# Patient Record
Sex: FEMALE | Race: WHITE | NOT HISPANIC OR LATINO | Employment: FULL TIME | URBAN - METROPOLITAN AREA
[De-identification: names, ages, dates, MRNs, and addresses within clinical notes are randomized per-mention and may not be internally consistent; named-entity substitution may affect disease eponyms.]

---

## 2019-01-01 ENCOUNTER — TRANSFERRED RECORDS (OUTPATIENT)
Dept: MULTI SPECIALTY CLINIC | Facility: CLINIC | Age: 54
End: 2019-01-01

## 2019-07-30 ENCOUNTER — TRANSFERRED RECORDS (OUTPATIENT)
Dept: MULTI SPECIALTY CLINIC | Facility: CLINIC | Age: 54
End: 2019-07-30
Payer: COMMERCIAL

## 2019-07-30 LAB
HPV ABSTRACT: ABNORMAL
PAP-ABSTRACT: NORMAL

## 2019-09-16 ENCOUNTER — TRANSFERRED RECORDS (OUTPATIENT)
Dept: HEALTH INFORMATION MANAGEMENT | Facility: CLINIC | Age: 54
End: 2019-09-16

## 2019-09-30 ENCOUNTER — TRANSFERRED RECORDS (OUTPATIENT)
Dept: HEALTH INFORMATION MANAGEMENT | Facility: CLINIC | Age: 54
End: 2019-09-30

## 2020-01-13 ENCOUNTER — DOCUMENTATION ONLY (OUTPATIENT)
Dept: CARE COORDINATION | Facility: CLINIC | Age: 55
End: 2020-01-13

## 2020-01-14 NOTE — TELEPHONE ENCOUNTER
RECORDS RECEIVED FROM: Self   Date of Appt: 2/27/20   NOTES (FOR ALL VISITS) STATUS DETAILS   OFFICE NOTE from referring provider N/A    OFFICE NOTE from other specialist Received Dr Mckeon @ [facility]:  9/30/19 9/9/19   DISCHARGE SUMMARY from hospital N/A    DISCHARGE REPORT from the ER Care Everywhere [facility]:  9/5/19   OPERATIVE REPORT N/A    MEDICATION LIST Care Everywhere    IMAGING  (FOR ALL VISITS)     EMG N/A    EEG N/A    MRI (HEAD, NECK, SPINE) Received [facility]:  MRI Cervical Spine 9/16/19  MRI Thoracic Spine 9/16/19    [facility]:  MRI Head 9/5/19   LUMBAR PUNCTURE N/A    SONYA Scan N/A    CT (HEAD, NECK, SPINE) N/A       Action 1/14/20 MV 10.22am   Action Taken Imaging request faxed to [facility] for:  MRI Head 9/5/19     Action 1/14/20 MV 10.22am   Action Taken Records request faxed to [facility]     Action 1/17/20 MV 11.26am   Action Taken Records received from [facility] via fax. Sent to scanning.    Waiting for images     Imaging Received  1/17/20 MV 11.27am  [facility]   Image Type (x): Disc:   PACS: x   Exam Date/Name MRI Head 9/5/19 Comments: images resolved in PACS     Imaging Received  1/27/20 MV 3.51pm  [facility]   Image Type (x): Disc: x  PACS:    Exam Date/Name MRI Cervical Spine 9/16/19  MRI Thoracic Spine 9/16/19 Comments: imaging disk received via mail. Sent to PACS upload

## 2020-02-27 ENCOUNTER — PRE VISIT (OUTPATIENT)
Dept: NEUROLOGY | Facility: CLINIC | Age: 55
End: 2020-02-27

## 2020-06-26 ENCOUNTER — TRANSFERRED RECORDS (OUTPATIENT)
Dept: HEALTH INFORMATION MANAGEMENT | Facility: CLINIC | Age: 55
End: 2020-06-26

## 2020-10-15 ENCOUNTER — TRANSFERRED RECORDS (OUTPATIENT)
Dept: HEALTH INFORMATION MANAGEMENT | Facility: CLINIC | Age: 55
End: 2020-10-15

## 2020-12-10 NOTE — TELEPHONE ENCOUNTER
RECORDS RECEIVED FROM: Self   Date of Appt: 1/14/21   NOTES (FOR ALL VISITS) STATUS DETAILS   OFFICE NOTE from referring provider N/A    OFFICE NOTE from other specialist Received Dr Mckeon @ [facility]:  9/30/19 9/9/19   DISCHARGE SUMMARY from hospital N/A    DISCHARGE REPORT from the ER [facility]where [facility]:  9/5/19   OPERATIVE REPORT N/A    MEDICATION LIST Care Everywhere    IMAGING  (FOR ALL VISITS)     EMG N/A    EEG N/A    LUMBAR PUNCTURE N/A    SONYA SCAN N/A    DEXA SCAN *NEUROSURGERY* N/A    ULTRASOUND (CAROTID BILAT) *VASCULAR* N/A    MRI (HEAD, NECK, SPINE) Received [facility]:  MRI Cervical Spine 9/16/19  MRI Thoracic Spine 9/16/19     [facility]:  MRI Head 9/5/19   XRAY (SPINE) *NEUROSURGERY* N/A    CT (HEAD, NECK, SPINE) N/A       Action    Action Taken 7-22: received records from [facility]. Office notes and reports sent to scanning. Received disc with images: MR head, MR t-spine, MR c-spine all 6-21-61, MR head, MR t-spine, MR c-spine all 6-26-20. Sent to  for processing.

## 2021-01-07 ENCOUNTER — MEDICAL CORRESPONDENCE (OUTPATIENT)
Dept: HEALTH INFORMATION MANAGEMENT | Facility: CLINIC | Age: 56
End: 2021-01-07

## 2021-01-07 ENCOUNTER — TRANSCRIBE ORDERS (OUTPATIENT)
Dept: OTHER | Age: 56
End: 2021-01-07

## 2021-01-07 DIAGNOSIS — H46.9 OPTIC NEURITIS, RIGHT: Primary | ICD-10-CM

## 2021-01-07 DIAGNOSIS — G35 MULTIPLE SCLEROSIS, RELAPSING-REMITTING (H): ICD-10-CM

## 2021-01-08 ENCOUNTER — TELEPHONE (OUTPATIENT)
Dept: OPHTHALMOLOGY | Facility: CLINIC | Age: 56
End: 2021-01-08

## 2021-01-08 NOTE — TELEPHONE ENCOUNTER
Right eye vision changes starting in beginning of November     Not worsening since noticed in beginning of November.    Had some pain in November-- not noticed now    Relapsing MS per pt/neurologist    Scheduled next week Wednesday with Dr. Soto at [facility] location     Pt aware of date/time/location and aware to call for any sudden changes    Note to facilitator    Luis Valdivia, RN 3:21 PM 01/08/21            [facility] Call Center    Phone Message    May a detailed message be left on voicemail: no     Reason for Call: Appointment Intake    Referring Provider Name: Deb Rooney at [facility] to Dr. Soto  Diagnosis and/or Symptoms: Optic neuritis, right, Multiple sclerosis, relapsing-remitting    Action Taken: Message routed to:  [facility] [[id number]]    Travel Screening: Not Applicable

## 2021-01-13 ENCOUNTER — OFFICE VISIT (OUTPATIENT)
Dept: OPHTHALMOLOGY | Facility: CLINIC | Age: 56
End: 2021-01-13
Payer: COMMERCIAL

## 2021-01-13 DIAGNOSIS — H53.40 VISUAL FIELD DEFECT: Primary | ICD-10-CM

## 2021-01-13 DIAGNOSIS — H47.20 PARTIAL OPTIC ATROPHY: Primary | ICD-10-CM

## 2021-01-13 DIAGNOSIS — H47.10 OPTIC DISC EDEMA: ICD-10-CM

## 2021-01-13 DIAGNOSIS — H53.40 VISUAL FIELD DEFECT: ICD-10-CM

## 2021-01-13 PROBLEM — G35 MULTIPLE SCLEROSIS (H): Status: ACTIVE | Noted: 2021-01-13

## 2021-01-13 PROBLEM — F41.9 ANXIETY: Status: ACTIVE | Noted: 2021-01-13

## 2021-01-13 PROBLEM — R87.810 CERVICAL HIGH RISK HPV (HUMAN PAPILLOMAVIRUS) TEST POSITIVE: Status: ACTIVE | Noted: 2017-05-01

## 2021-01-13 PROCEDURE — 99204 OFFICE O/P NEW MOD 45 MIN: CPT | Performed by: OPHTHALMOLOGY

## 2021-01-13 PROCEDURE — 92133 CPTRZD OPH DX IMG PST SGM ON: CPT | Performed by: OPHTHALMOLOGY

## 2021-01-13 PROCEDURE — 92083 EXTENDED VISUAL FIELD XM: CPT | Performed by: OPHTHALMOLOGY

## 2021-01-13 RX ORDER — INTERFERON BETA-1A 8.8-22(6)
KIT SUBCUTANEOUS
COMMUNITY
Start: 2020-02-06 | End: 2021-09-29

## 2021-01-13 RX ORDER — PAROXETINE 30 MG/1
30 TABLET, FILM COATED ORAL EVERY EVENING
COMMUNITY
Start: 2021-01-11

## 2021-01-13 RX ORDER — OXYBUTYNIN CHLORIDE 10 MG/1
10 TABLET, EXTENDED RELEASE ORAL
COMMUNITY
Start: 2019-09-17 | End: 2022-01-04

## 2021-01-13 ASSESSMENT — SLIT LAMP EXAM - LIDS
COMMENTS: NORMAL
COMMENTS: NORMAL

## 2021-01-13 ASSESSMENT — VISUAL ACUITY
OS_CC: 20/20
METHOD: SNELLEN - LINEAR
CORRECTION_TYPE: GLASSES
OD_CC: 20/40

## 2021-01-13 ASSESSMENT — TONOMETRY
IOP_METHOD: ICARE
OD_IOP_MMHG: 15
OS_IOP_MMHG: 16

## 2021-01-13 ASSESSMENT — CONF VISUAL FIELD
OD_NORMAL: 1
METHOD: COUNTING FINGERS
OS_NORMAL: 1

## 2021-01-13 ASSESSMENT — EXTERNAL EXAM - LEFT EYE: OS_EXAM: NORMAL

## 2021-01-13 ASSESSMENT — EXTERNAL EXAM - RIGHT EYE: OD_EXAM: NORMAL

## 2021-01-13 NOTE — NURSING NOTE
"Chief Complaints and History of Present Illnesses   Patient presents with     Consult For     Multiple Sclerosis     Chief Complaint(s) and History of Present Illness(es)     Consult For     Laterality: both eyes    Quality: blurred vision    Associated symptoms: Negative for double vision, eye pain, headache, flashes and floaters    Treatments tried: no treatments    Pain scale: 0/10    Comments: Multiple Sclerosis              Comments     Pt notes that she had VA changes since November, has been stable, \"feels that it is the same symptoms as when I had optic neuritis\", she notes that she feels that symptoms have been stable possibly improving. Blurred VA is in RE only and comes and goes.     Evette Lynch COT January 13, 2021 8:11 AM                  "

## 2021-01-13 NOTE — Clinical Note
1/13/2021       RE: Shakila Kapoor  [address] [place] [address]     Dear Colleague,    Thank you for referring your patient, Shakila Kapoor, to the [facility] [place] [facility]. Please see a copy of my visit note below.         Assessment & Plan     Shakila Kapoor is a 55 year old female with the following diagnoses:   1. Partial optic atrophy    2. Visual field defect    3. Optic disc edema         Patient was sent for consultation by Dr. Deb Mclaughlin* for optic neuritis.  In September 2017 had an episode of optic neuritis.  Imaging done and was diagnosed with multiple sclerosis.  When she was 21 she was diagnosed as probable multiple sclerosis with numbness of her left side of her body and vision loss and diplopia.  In October she had eye pain with blurred vision RIGHT eye.  A cloud would come over eye and would occur a couple times a day and just last for seconds.  It resolved after a few days.  No steroids given and no imaging given.  Denies transient visual obscurations.      Visual acuity 20/40 RIGHT eye and 20/20 left eye.  She has a 1-2+ afferent pupillary defect RIGHT eye.  Color vision 1/11 RIGHT eye and 11/11 left eye.   She has chronic appearing optic disc edema in the right eye.  There is no cup-to-disc ratio.  There appear to be cytoid bodies overlying the right optic nerve.    I personally reviewed her MRI images from 2019.  There appears to be enhancement of the optic nerve sheath in the posterior orbit extending into the optic canal on the right-hand side.      I do not believe that her most recent symptoms were consistent with optic neuritis.  She had some clouding of the vision lasting seconds at a time which only lasted a couple of days.  There was some mild soreness of the eye but otherwise there is nothing consistent with optic neuritis here.  Even if she had optic neuritis in October, I would  not expect the optic nerve to be swollen still.  I am concerned that she has an optic nerve sheath meningioma in the right eye.  I will obtain a new MRI.  If it shows a similar appearance to the nerve sheath, then the diagnosis would be confirmed and we could consider radiation therapy.  I will plan follow-up based on the results of the MRI.           Attending Physician Attestation:  Complete documentation of historical and exam elements from today's encounter can be found in the full encounter summary report (not reduplicated in this progress note).  I personally obtained the chief complaint(s) and history of present illness.  I confirmed and edited as necessary the review of systems, past medical/surgical history, family history, social history, and examination findings as documented by others; and I examined the patient myself.  I personally reviewed the relevant tests, images, and reports as documented above.  I formulated and edited as necessary the assessment and plan and discussed the findings and management plan with the patient and family. - Samuel Soto MD            Again, thank you for allowing me to participate in the care of your patient.      Sincerely,    Samuel Soto MD

## 2021-01-13 NOTE — PROGRESS NOTES
Assessment & Plan     Shakila Kapoor is a 55 year old female with the following diagnoses:   1. Partial optic atrophy    2. Visual field defect    3. Optic disc edema         Patient was sent for consultation by Dr. Deb Mclaughlin* for optic neuritis.  In September 2017 had an episode of optic neuritis.  Imaging done and was diagnosed with multiple sclerosis.  When she was 21 she was diagnosed as probable multiple sclerosis with numbness of her left side of her body and vision loss and diplopia.  In October she had eye pain with blurred vision RIGHT eye.  A cloud would come over eye and would occur a couple times a day and just last for seconds.  It resolved after a few days.  No steroids given and no imaging given.  Denies transient visual obscurations.      Visual acuity 20/40 RIGHT eye and 20/20 left eye.  She has a 1-2+ afferent pupillary defect RIGHT eye.  Color vision 1/11 RIGHT eye and 11/11 left eye.   She has chronic appearing optic disc edema in the right eye.  There is no cup-to-disc ratio.  There appear to be cytoid bodies overlying the right optic nerve.    I personally reviewed her MRI images from 2019.  There appears to be enhancement of the optic nerve sheath in the posterior orbit extending into the optic canal on the right-hand side.      I do not believe that her most recent symptoms were consistent with optic neuritis.  She had some clouding of the vision lasting seconds at a time which only lasted a couple of days.  There was some mild soreness of the eye but otherwise there is nothing consistent with optic neuritis here.  Even if she had optic neuritis in October, I would not expect the optic nerve to be swollen still.  I am concerned that she has an optic nerve sheath meningioma in the right eye.  I will obtain a new MRI.  If it shows a similar appearance to the nerve sheath, then the diagnosis would be confirmed and we could consider radiation therapy.  I will plan follow-up  based on the results of the MRI.           Attending Physician Attestation:  Complete documentation of historical and exam elements from today's encounter can be found in the full encounter summary report (not reduplicated in this progress note).  I personally obtained the chief complaint(s) and history of present illness.  I confirmed and edited as necessary the review of systems, past medical/surgical history, family history, social history, and examination findings as documented by others; and I examined the patient myself.  I personally reviewed the relevant tests, images, and reports as documented above.  I formulated and edited as necessary the assessment and plan and discussed the findings and management plan with the patient and family. - Samuel Soto MD

## 2021-01-13 NOTE — LETTER
2021         RE:  :  MRN: Shakila Kapoor  1965  [id number]     Dear Dr. Davis,    Thank you for asking me to see your very pleasant patient, Shakila Kapoor, in neuro-ophthalmic consultation.  I would like to thank you for sending your records and I have summarized them in the history of present illness.   My assessment and plan are below.  For further details, please see my attached clinic note.      Assessment & Plan     Shakila Kapoor is a 55 year old female with the following diagnoses:   1. Partial optic atrophy    2. Visual field defect    3. Optic disc edema         Patient was sent for consultation by Dr. Deb Mclaughlin* for optic neuritis.  In 2017 had an episode of optic neuritis.  Imaging done and was diagnosed with multiple sclerosis.  When she was 21 she was diagnosed as probable multiple sclerosis with numbness of her left side of her body and vision loss and diplopia.  In October she had eye pain with blurred vision RIGHT eye.  A cloud would come over eye and would occur a couple times a day and just last for seconds.  It resolved after a few days.  No steroids given and no imaging given.  Denies transient visual obscurations.      Visual acuity 20/40 RIGHT eye and 20/20 left eye.  She has a 1-2+ afferent pupillary defect RIGHT eye.  Color vision 1/11 RIGHT eye and 11/11 left eye.   She has chronic appearing optic disc edema in the right eye.  There is no cup-to-disc ratio.  There appear to be cytoid bodies overlying the right optic nerve.    I personally reviewed her MRI images from 2019.  There appears to be enhancement of the optic nerve sheath in the posterior orbit extending into the optic canal on the right-hand side.      I do not believe that her most recent symptoms were consistent with optic neuritis.  She had some clouding of the vision lasting seconds at a time which only lasted a couple of days.  There was some mild soreness of the eye but  otherwise there is nothing consistent with optic neuritis here.  Even if she had optic neuritis in October, I would not expect the optic nerve to be swollen still.  I am concerned that she has an optic nerve sheath meningioma in the right eye.  I will obtain a new MRI.  If it shows a similar appearance to the nerve sheath, then the diagnosis would be confirmed and we could consider radiation therapy.  I will plan follow-up based on the results of the MRI.          Again, thank you for allowing me to participate in the care of your patient.      Sincerely,    Samuel Soto MD  Professor  Ophthalmology Residency   Director of Neuro-Ophthalmology  [facility] Endowed Chair  Departments of Ophthalmology, Neurology, and Neurosurgery  [address]  [address]  T - [phone]  F - [phone]  SARITHA [email or web address]      CC: Deb Davis MD  [facility] [address]  [facility] [address]  Via Fax: [phone]    DX = chronic optic disc edema, ?optic nerve sheath meningioma

## 2021-01-14 ENCOUNTER — PRE VISIT (OUTPATIENT)
Dept: NEUROLOGY | Facility: CLINIC | Age: 56
End: 2021-01-14

## 2021-01-27 ENCOUNTER — ANCILLARY PROCEDURE (OUTPATIENT)
Dept: MRI IMAGING | Facility: CLINIC | Age: 56
End: 2021-01-27
Attending: OPHTHALMOLOGY
Payer: COMMERCIAL

## 2021-01-27 ENCOUNTER — TELEPHONE (OUTPATIENT)
Dept: OPHTHALMOLOGY | Facility: CLINIC | Age: 56
End: 2021-01-27

## 2021-01-27 VITALS — HEIGHT: 65 IN | BODY MASS INDEX: 33.32 KG/M2 | WEIGHT: 200 LBS

## 2021-01-27 DIAGNOSIS — H47.20 PARTIAL OPTIC ATROPHY: ICD-10-CM

## 2021-01-27 DIAGNOSIS — D32.0 PRIMARY MENINGIOMA OF OPTIC NERVE SHEATH (H): Primary | ICD-10-CM

## 2021-01-27 DIAGNOSIS — H53.40 VISUAL FIELD DEFECT: ICD-10-CM

## 2021-01-27 PROCEDURE — 70543 MRI ORBT/FAC/NCK W/O &W/DYE: CPT | Mod: GC | Performed by: RADIOLOGY

## 2021-01-27 PROCEDURE — A9585 GADOBUTROL INJECTION: HCPCS | Performed by: RADIOLOGY

## 2021-01-27 RX ORDER — GADOBUTROL 604.72 MG/ML
10 INJECTION INTRAVENOUS ONCE
Status: COMPLETED | OUTPATIENT
Start: 2021-01-27 | End: 2021-01-27

## 2021-01-27 RX ADMIN — GADOBUTROL 10 ML: 604.72 INJECTION INTRAVENOUS at 11:34

## 2021-01-27 ASSESSMENT — MIFFLIN-ST. JEOR: SCORE: 1513.07

## 2021-01-27 NOTE — TELEPHONE ENCOUNTER
Spoke to patient about results of MRI showing continued abnormality of right optic nerve, which suggests optic nerve sheath meningioma.  Dr. Soto would like patient to see radiation someone for radiation therapy and will help arrange this.  Assisted with scheduling follow up in 6 months.  Patient should return sooner if worsening of vision.       Felisha Henderson on 1/27/2021 at 3:39 PM

## 2021-02-10 NOTE — PROGRESS NOTES
Attending addendum:   I saw and examined the patient with the resident and agree with the documented plan of care.    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 In light of the radiographic evidence concerning for tumor growth over the past 2 years and worsening visual function within the affected right eye as assessed by my colleagues in neuro-ophthalmology, I proposed proceeding with a course of conventionally fractionated radiotherapy to the affected segment of the right optic nerve for improved local disease control.    I specifically proposed a treatment course consisting of 50.4 Medina delivered in 28 once-daily fractions to the tumor and I reviewed the potential acute and late-term toxicities associated with radiation of this location. Following our discussion, Ms. Kapoor was amenable to proceeding with treatment and she signed informed consent to that effect.    I will make arrangements for her to return to clinic for a simulation session within the next 1-2 weeks with treatment to start approximately 1-1.5 weeks thereafter. In the interim, I did provide both Ms. Kapoor and her  with my contact information and gave them instructions to call me should they have any additional questions following our conversation today.    Abdulaziz Pride MD/PhD    Dept of Radiation Oncology  [facility]             Department of Radiation Oncology  [address] [address] [phone]       Consultation Note    Name: Shakila Kapoor MRN: [id number]   : 1965   Date of Service: 2021 Referring: Dr. Samuel Soto / Ophthalmology     Reason for consultation: Right optic nerve sheath meningioma    History of Present Illness   Ms. Kapoor is a 55 year old female with PMH significant for multiple  sclerosis and optic neuritis. She presented to her PCP with ongoing visual symptoms that was initially thought to be related to her optic neuritis. She was referred to Dr. Samuel Soto in Neuro-Ophthalmology for further management on 1/13/2021.    Her ophthalmic exam was notable for visual acuity 20/40 right eye and 20/20 left eye, 1-2+ afferent pupillary defect of right eye, color vision 1/11 right eye and 11/11 left eye, right optic disc edema as well as evidence of cytoid bodies overlying the right optic nerve. Dr. Soto reviewed her MRI imaging from 2019 and felt that her symptoms were related to a right optic sheath meningioma given the enhancement of the right optic nerve sheath. As such, he ordered a new MRI brain which was obtained on 1/27/2021. It showed slight increase in thickening and enhancement of the right optic nerve sheath extending from the orbital apex through the optic canal and adjacent to the cavernous sinus, consistent with right optic nerve sheath meningioma. The patient was referred to us for a discussion of definitive radiation therapy.    Ms. Kapoor presents to radiation oncology clinic today with her  to learn more about the role of radiotherapy in the treatment of her disease.  Her ROS are positive for occasional episodes of cloudy vision however she otherwise denies any new neurologic symptoms as well as specifically denies any fever/chills, ocular pain, double vision, headaches, nausea/vomiting, motor or sensory deficits or changes in appetite and/or weight.    Past Medical History:  Past Medical History:   Diagnosis Date     Multiple sclerosis (H)        Past Surgical History:  None    Chemotherapy History:  None    Radiation History:  None    Pregnant: No     Implanted Cardiac Devices: No    Medications:  Current Outpatient Medications   Medication     oxybutynin ER (DITROPAN-XL) 10 MG 24 hr tablet     PARoxetine (PAXIL) 30 MG tablet     REBIF REBIDOSE TITRATION PACK 6X8.8 & 6X22  MCG SOAJ     Allergies:   Allergies   Allergen Reactions     Penicillins      Other reaction(s): *Unknown     Social History:  Tobacco: Never  Alcohol: Yes      Family History:  No family history of cancer, glaucoma or macular degeneration    Review of Systems   A 10-point review of systems was performed. Pertinent findings are noted in the HPI.    Physical Exam   ECOG Status: 1    Vitals:  BP: 153/88  Weight: 94.8 kg    General: Healthy-appearing 55-year-old female seated comfortably in an examination chair in no acute distress  HEENT: NC/AT. Sclerae anicteric. EOMI.  Pulmonary: No wheezing, stridor or respiratory distress  Neuro: A/O x3. 5/5 strength throughout.     Imaging   Imaging: Reviewed    Assessment    Ms. Kapoor is a 55 year old female with a history of multiple sclerosis and a recent diagnosis of a right optic nerve sheath meningioma. Her most recent surveillance imaging showed increased size and enhancement of the meningioma compared to exam in 2019.    Plan   We discussed with the patient the available management options of her right optic nerve sheath meningioma including observation and definitive radiation therapy.     That said, in the light of her ongoing visual symptoms and continued growth compared to the last imaging in 2019, we recommend definitive radiation therapy for optimal local control. We plan to deliver 5,040 cGy in 28 fractions at 180 cGy per fraction. We discussed the rationale, logistics, benefits, alternatives and potential side effects associated with the treatment including but not limited to fatigue, temporary exacerbation of eye symptoms, cataract and dry eye.    At end of discussion, the patient agreed with the plan and signed the treatment consent. We will schedule a CT and MRI-based simulation within 2 weeks.    The patient and her  had many questions during our conversation that were answered to their satisfaction and verbalized understanding.     The patient  was seen and discussed with staff, Dr. Pride.    Nadeem Pelletier MD  PGY-4 Resident, Radiation Oncology  [facility]

## 2021-02-11 ENCOUNTER — OFFICE VISIT (OUTPATIENT)
Dept: RADIATION ONCOLOGY | Facility: CLINIC | Age: 56
End: 2021-02-11
Attending: OPHTHALMOLOGY
Payer: COMMERCIAL

## 2021-02-11 VITALS
DIASTOLIC BLOOD PRESSURE: 88 MMHG | SYSTOLIC BLOOD PRESSURE: 153 MMHG | HEIGHT: 65 IN | WEIGHT: 209 LBS | BODY MASS INDEX: 34.82 KG/M2

## 2021-02-11 DIAGNOSIS — D32.0 PRIMARY MENINGIOMA OF OPTIC NERVE SHEATH (H): ICD-10-CM

## 2021-02-11 PROCEDURE — G0463 HOSPITAL OUTPT CLINIC VISIT: HCPCS | Performed by: RADIOLOGY

## 2021-02-11 ASSESSMENT — MIFFLIN-ST. JEOR: SCORE: 1553.9

## 2021-02-11 ASSESSMENT — ENCOUNTER SYMPTOMS
CONSTIPATION: 0
ORTHOPNEA: 0
FALLS: 0
LOSS OF CONSCIOUSNESS: 1
DIAPHORESIS: 0
ABDOMINAL PAIN: 0
DEPRESSION: 0
HEADACHES: 0
TREMORS: 0
SEIZURES: 0
EYE PAIN: 0
MEMORY LOSS: 0
WEIGHT LOSS: 0
NECK PAIN: 0
SHORTNESS OF BREATH: 0
SPUTUM PRODUCTION: 0
DOUBLE VISION: 0
FLANK PAIN: 0
HALLUCINATIONS: 0
TINGLING: 0
CLAUDICATION: 0
EYE DISCHARGE: 0
CHILLS: 1
SENSORY CHANGE: 0
PND: 0
HEMATURIA: 0
HEARTBURN: 0
INSOMNIA: 0
EYE REDNESS: 0
SINUS PAIN: 0
COUGH: 0
BRUISES/BLEEDS EASILY: 0
HEMOPTYSIS: 0
FREQUENCY: 0
BACK PAIN: 0
POLYDIPSIA: 0
WHEEZING: 0
FOCAL WEAKNESS: 0
SPEECH CHANGE: 1
BLOOD IN STOOL: 0
DYSURIA: 0
BLURRED VISION: 1
NAUSEA: 0
DIARRHEA: 0
PHOTOPHOBIA: 0
WEAKNESS: 0
PALPITATIONS: 0
NERVOUS/ANXIOUS: 1
MYALGIAS: 0
DIZZINESS: 0
SORE THROAT: 0
VOMITING: 0
STRIDOR: 0
FEVER: 0

## 2021-02-11 ASSESSMENT — LIFESTYLE VARIABLES: SUBSTANCE_ABUSE: 0

## 2021-02-11 NOTE — PROGRESS NOTES
HPI    INITIAL PATIENT ASSESSMENT    Diagnosis: Meningioma     Prior radiation therapy: None    Prior chemotherapy: None    Prior hormonal therapy:No    Pain Eval:  Denies    Psychosocial  Living arrangements: Lives   Fall Risk: independent   referral needs: Not needed    Advanced Directive: No  Implantable Cardiac Device? No      Nurse face-to-face time: Level 5:  over 15 min face to face time  Review of Systems   Constitutional: Positive for chills and malaise/fatigue. Negative for diaphoresis, fever and weight loss.   HENT: Negative for congestion, ear discharge, ear pain, hearing loss, nosebleeds, sinus pain, sore throat and tinnitus.    Eyes: Positive for blurred vision. Negative for double vision, photophobia, pain, discharge and redness.   Respiratory: Negative for cough, hemoptysis, sputum production, shortness of breath, wheezing and stridor.    Cardiovascular: Negative for chest pain, palpitations, orthopnea, claudication, leg swelling and PND.   Gastrointestinal: Negative for abdominal pain, blood in stool, constipation, diarrhea, heartburn, melena, nausea and vomiting.   Genitourinary: Negative for dysuria, flank pain, frequency, hematuria and urgency.   Musculoskeletal: Positive for joint pain. Negative for back pain, falls, myalgias and neck pain.   Skin: Negative for itching and rash.   Neurological: Positive for speech change and loss of consciousness. Negative for dizziness, tingling, tremors, sensory change, focal weakness, seizures, weakness and headaches.   Endo/Heme/Allergies: Negative for environmental allergies and polydipsia. Does not bruise/bleed easily.   Psychiatric/Behavioral: Negative for depression, hallucinations, memory loss, substance abuse and suicidal ideas. The patient is nervous/anxious. The patient does not have insomnia.

## 2021-02-11 NOTE — LETTER
2021         RE: Shakila Kapoor  [address] [place] [address]      Attending addendum:   I saw and examined the patient with the resident and agree with the documented plan of care.    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 In light of the radiographic evidence concerning for tumor growth over the past 2 years and worsening visual function within the affected right eye as assessed by my colleagues in neuro-ophthalmology, I proposed proceeding with a course of conventionally fractionated radiotherapy to the affected segment of the right optic nerve for improved local disease control.    I specifically proposed a treatment course consisting of 50.4 Med[place] delivered in 28 once-daily fractions to the tumor and I reviewed the potential acute and late-term toxicities associated with radiation of this location. Following our discussion, Ms. Kapoor was amenable to proceeding with treatment and she signed informed consent to that effect.    I will make arrangements for her to return to clinic for a simulation session within the next 1-2 weeks with treatment to start approximately 1-1.5 weeks thereafter. In the interim, I did provide both Ms. Kapoor and her  with my contact information and gave them instructions to call me should they have any additional questions following our conversation today.    Abdulaziz Pride MD/PhD    Dept of Radiation Oncology  [facility]             Department of Radiation Oncology  [address] [address] [phone]       Consultation Note    Name: Shakila Kapoor MRN: [id number]   : 1965   Date of Service: 2021 Referring: Dr. Samuel Soto / Ophthalmology     Reason for consultation: Right optic nerve sheath meningioma    History of  Present Illness   Ms. Kapoor is a 55 year old female with PMH significant for multiple sclerosis and optic neuritis. She presented to her PCP with ongoing visual symptoms that was initially thought to be related to her optic neuritis. She was referred to Dr. Samuel Soto in Neuro-Ophthalmology for further management on 1/13/2021.    Her ophthalmic exam was notable for visual acuity 20/40 right eye and 20/20 left eye, 1-2+ afferent pupillary defect of right eye, color vision 1/11 right eye and 11/11 left eye, right optic disc edema as well as evidence of cytoid bodies overlying the right optic nerve. Dr. Soto reviewed her MRI imaging from 2019 and felt that her symptoms were related to a right optic sheath meningioma given the enhancement of the right optic nerve sheath. As such, he ordered a new MRI brain which was obtained on 1/27/2021. It showed slight increase in thickening and enhancement of the right optic nerve sheath extending from the orbital apex through the optic canal and adjacent to the cavernous sinus, consistent with right optic nerve sheath meningioma. The patient was referred to us for a discussion of definitive radiation therapy.    Ms. Kapoor presents to radiation oncology clinic today with her  to learn more about the role of radiotherapy in the treatment of her disease.  Her ROS are positive for occasional episodes of cloudy vision however she otherwise denies any new neurologic symptoms as well as specifically denies any fever/chills, ocular pain, double vision, headaches, nausea/vomiting, motor or sensory deficits or changes in appetite and/or weight.    Past Medical History:  Past Medical History:   Diagnosis Date     Multiple sclerosis (H)        Past Surgical History:  None    Chemotherapy History:  None    Radiation History:  None    Pregnant: No     Implanted Cardiac Devices: No    Medications:  Current Outpatient Medications   Medication     oxybutynin ER (DITROPAN-XL) 10 MG 24 hr  tablet     PARoxetine (PAXIL) 30 MG tablet     REBIF REBIDOSE TITRATION PACK 6X8.8 & 6X22 MCG SOAJ     Allergies:   Allergies   Allergen Reactions     Penicillins      Other reaction(s): *Unknown     Social History:  Tobacco: Never  Alcohol: Yes      Family History:  No family history of cancer, glaucoma or macular degeneration    Review of Systems   A 10-point review of systems was performed. Pertinent findings are noted in the HPI.    Physical Exam   ECOG Status: 1    Vitals:  BP: 153/88  Weight: 94.8 kg    General: Healthy-appearing 55-year-old female seated comfortably in an examination chair in no acute distress  HEENT: NC/AT. Sclerae anicteric. EOMI.  Pulmonary: No wheezing, stridor or respiratory distress  Neuro: A/O x3. 5/5 strength throughout.     Imaging   Imaging: Reviewed    Assessment    Ms. Kapoor is a 55 year old female with a history of multiple sclerosis and a recent diagnosis of a right optic nerve sheath meningioma. Her most recent surveillance imaging showed increased size and enhancement of the meningioma compared to exam in 2019.    Plan   We discussed with the patient the available management options of her right optic nerve sheath meningioma including observation and definitive radiation therapy.     That said, in the light of her ongoing visual symptoms and continued growth compared to the last imaging in 2019, we recommend definitive radiation therapy for optimal local control. We plan to deliver 5,040 cGy in 28 fractions at 180 cGy per fraction. We discussed the rationale, logistics, benefits, alternatives and potential side effects associated with the treatment including but not limited to fatigue, temporary exacerbation of eye symptoms, cataract and dry eye.    At end of discussion, the patient agreed with the plan and signed the treatment consent. We will schedule a CT and MRI-based simulation within 2 weeks.    The patient and her  had many questions during our  conversation that were answered to their satisfaction and verbalized understanding.     The patient was seen and discussed with staff, Dr. Pride.    Nadeem Pelletier MD  PGY-4 Resident, Radiation Oncology  [facility]              HPI    INITIAL PATIENT ASSESSMENT    Diagnosis: Meningioma     Prior radiation therapy: None    Prior chemotherapy: None    Prior hormonal therapy:No    Pain Eval:  Denies    Psychosocial  Living arrangements: Lives   Fall Risk: independent   referral needs: Not needed    Advanced Directive: No  Implantable Cardiac Device? No      Nurse face-to-face time: Level 5:  over 15 min face to face time  Review of Systems   Constitutional: Positive for chills and malaise/fatigue. Negative for diaphoresis, fever and weight loss.   HENT: Negative for congestion, ear discharge, ear pain, hearing loss, nosebleeds, sinus pain, sore throat and tinnitus.    Eyes: Positive for blurred vision. Negative for double vision, photophobia, pain, discharge and redness.   Respiratory: Negative for cough, hemoptysis, sputum production, shortness of breath, wheezing and stridor.    Cardiovascular: Negative for chest pain, palpitations, orthopnea, claudication, leg swelling and PND.   Gastrointestinal: Negative for abdominal pain, blood in stool, constipation, diarrhea, heartburn, melena, nausea and vomiting.   Genitourinary: Negative for dysuria, flank pain, frequency, hematuria and urgency.   Musculoskeletal: Positive for joint pain. Negative for back pain, falls, myalgias and neck pain.   Skin: Negative for itching and rash.   Neurological: Positive for speech change and loss of consciousness. Negative for dizziness, tingling, tremors, sensory change, focal weakness, seizures, weakness and headaches.   Endo/Heme/Allergies: Negative for environmental allergies and polydipsia. Does not bruise/bleed easily.   Psychiatric/Behavioral: Negative for depression,  hallucinations, memory loss, substance abuse and suicidal ideas. The patient is nervous/anxious. The patient does not have insomnia.        Abdulaziz Pride MD

## 2021-02-15 LAB
ALT SERPL-CCNC: 53 U/L (ref 6–29)
AST SERPL-CCNC: 35 U/L (ref 10–35)

## 2021-02-25 ENCOUNTER — ALLIED HEALTH/NURSE VISIT (OUTPATIENT)
Dept: RADIATION ONCOLOGY | Facility: CLINIC | Age: 56
End: 2021-02-25
Attending: RADIOLOGY
Payer: COMMERCIAL

## 2021-02-25 ENCOUNTER — HOSPITAL ENCOUNTER (OUTPATIENT)
Dept: MRI IMAGING | Facility: CLINIC | Age: 56
Discharge: HOME | End: 2021-02-25
Attending: RADIOLOGY | Admitting: RADIOLOGY
Payer: COMMERCIAL

## 2021-02-25 DIAGNOSIS — D32.0 PRIMARY MENINGIOMA OF OPTIC NERVE SHEATH (H): ICD-10-CM

## 2021-02-25 DIAGNOSIS — D32.0 PRIMARY MENINGIOMA OF OPTIC NERVE SHEATH (H): Primary | ICD-10-CM

## 2021-02-25 PROCEDURE — A9585 GADOBUTROL INJECTION: HCPCS | Performed by: RADIOLOGY

## 2021-02-25 PROCEDURE — 77334 RADIATION TREATMENT AID(S): CPT | Performed by: RADIOLOGY

## 2021-02-25 PROCEDURE — 255N000002 HC RX 255 OP 636: Performed by: RADIOLOGY

## 2021-02-25 PROCEDURE — 70553 MRI BRAIN STEM W/O & W/DYE: CPT | Mod: 26 | Performed by: STUDENT IN AN ORGANIZED HEALTH CARE EDUCATION/TRAINING PROGRAM

## 2021-02-25 PROCEDURE — 77263 THER RADIOLOGY TX PLNG CPLX: CPT | Performed by: RADIOLOGY

## 2021-02-25 PROCEDURE — 70553 MRI BRAIN STEM W/O & W/DYE: CPT

## 2021-02-25 PROCEDURE — 70543 MRI ORBT/FAC/NCK W/O &W/DYE: CPT | Mod: 26 | Performed by: STUDENT IN AN ORGANIZED HEALTH CARE EDUCATION/TRAINING PROGRAM

## 2021-02-25 PROCEDURE — 77334 RADIATION TREATMENT AID(S): CPT | Mod: 26 | Performed by: RADIOLOGY

## 2021-02-25 RX ORDER — GADOBUTROL 604.72 MG/ML
10 INJECTION INTRAVENOUS ONCE
Status: COMPLETED | OUTPATIENT
Start: 2021-02-25 | End: 2021-02-25

## 2021-02-25 RX ADMIN — GADOBUTROL 10 ML: 604.72 INJECTION INTRAVENOUS at 15:11

## 2021-03-08 ENCOUNTER — APPOINTMENT (OUTPATIENT)
Dept: RADIATION ONCOLOGY | Facility: CLINIC | Age: 56
End: 2021-03-08
Attending: RADIOLOGY
Payer: COMMERCIAL

## 2021-03-08 PROCEDURE — 77014 PR CT GUIDE FOR PLACEMENT RADIATION THERAPY FIELDS: CPT | Mod: 26 | Performed by: RADIOLOGY

## 2021-03-08 PROCEDURE — 77386 HC IMRT TREATMENT DELIVERY, COMPLEX: CPT | Performed by: RADIOLOGY

## 2021-03-09 ENCOUNTER — APPOINTMENT (OUTPATIENT)
Dept: RADIATION ONCOLOGY | Facility: CLINIC | Age: 56
End: 2021-03-09
Attending: RADIOLOGY
Payer: COMMERCIAL

## 2021-03-09 PROCEDURE — 77014 PR CT GUIDE FOR PLACEMENT RADIATION THERAPY FIELDS: CPT | Mod: 26 | Performed by: RADIOLOGY

## 2021-03-09 PROCEDURE — 77386 HC IMRT TREATMENT DELIVERY, COMPLEX: CPT | Performed by: RADIOLOGY

## 2021-03-10 ENCOUNTER — APPOINTMENT (OUTPATIENT)
Dept: RADIATION ONCOLOGY | Facility: CLINIC | Age: 56
End: 2021-03-10
Attending: RADIOLOGY
Payer: COMMERCIAL

## 2021-03-10 PROCEDURE — 77014 PR CT GUIDE FOR PLACEMENT RADIATION THERAPY FIELDS: CPT | Mod: 26 | Performed by: RADIOLOGY

## 2021-03-10 PROCEDURE — 77386 HC IMRT TREATMENT DELIVERY, COMPLEX: CPT | Performed by: RADIOLOGY

## 2021-03-11 ENCOUNTER — OFFICE VISIT (OUTPATIENT)
Dept: RADIATION ONCOLOGY | Facility: CLINIC | Age: 56
End: 2021-03-11
Attending: RADIOLOGY
Payer: COMMERCIAL

## 2021-03-11 VITALS
DIASTOLIC BLOOD PRESSURE: 81 MMHG | SYSTOLIC BLOOD PRESSURE: 128 MMHG | WEIGHT: 209 LBS | BODY MASS INDEX: 34.78 KG/M2 | HEART RATE: 74 BPM

## 2021-03-11 DIAGNOSIS — D32.0 PRIMARY MENINGIOMA OF OPTIC NERVE SHEATH (H): Primary | ICD-10-CM

## 2021-03-11 PROCEDURE — 77386 HC IMRT TREATMENT DELIVERY, COMPLEX: CPT | Performed by: RADIOLOGY

## 2021-03-11 RX ORDER — MINERAL OIL, PETROLATUM 425; 573 MG/G; MG/G
OINTMENT OPHTHALMIC
Qty: 3.5 G | Refills: 3 | Status: SHIPPED | OUTPATIENT
Start: 2021-03-11 | End: 2022-11-10

## 2021-03-11 NOTE — PROGRESS NOTES
RADIATION ONCOLOGY WEEKLY ON TREATMENT VISIT   Encounter Date: 2021    Patient Name: Shakila Kapoor  MRN: [id number]  : 1965     Disease and Stage: Right optic nerve sheath meningioma  Treatment Site: Right optic nerve  Current Dose/Planned Total Dose: 720 / 5040 cGy  Daily Fraction Size: 180 cGy/day, 5 times/week  Concurrent Chemotherapy: No    Treatment Summary:    3/8/2021: Initiation of radiotherapy    3/11/2021: Weekly RT visit. Current dose of 720 cGy. Tolerating treatment well.    ED visits/Hospitalizations:  None    Missed Treatments:  None    Subjective: Ms. Kapoor presents to clinic today for her weekly on-treatment visit. She has tolerated the initiation of radiotherapy very well and has no radiation-induced toxicities.    ROS:   Constitutional  Pain (0-10): 0  Fatigue: None    Nutrition  Diet: None    CNS  Headaches: None    ENT  Mucositis: None    Cardiopulmonary  Dyspnea: None  Chest pain: None    GI  Nausea/vomiting: None    Integumentary  Dermatitis: None    Objective:   Weight: 94.8 kg  BP: 128/81  Pulse: 74    General: 55-year-old female seated comfortably in a chair in no acute distress  HEENT: NC/AT.  No alopecia.  EOMI.  PERRL.  Pulmonary: No wheezing, stridor or respiratory distress  Skin: No erythema    Treatment-related toxicities (CTCAE v5.0):  None    Assessment:    Ms. Kapoor is a 55 year old female with a right optic nerve sheath meningioma. She is receiving definitive radiation therapy for local control purposes. She has tolerated treatment well thus far with no acute treatment-related toxicities.    Plan:   Right optic nerve sheath meningioma:    Continue radiotherapy    Fluids/Electrolytes/Nutrition:    Continue diet as tolerated    Dry eye prophylaxis:    Recommended starting saline eyedrops throughout the day with application of Refresh p.m. drops to the right eye nightly    Mosaiq chart and setup information reviewed  MVCT images reviewed    Medication  Review  Med list reviewed with patient?: Yes    Abdulaziz Pride MD/PhD    Dept of Radiation Oncology  [facility]

## 2021-03-12 ENCOUNTER — APPOINTMENT (OUTPATIENT)
Dept: RADIATION ONCOLOGY | Facility: CLINIC | Age: 56
End: 2021-03-12
Attending: RADIOLOGY
Payer: COMMERCIAL

## 2021-03-12 PROCEDURE — 77014 PR CT GUIDE FOR PLACEMENT RADIATION THERAPY FIELDS: CPT | Mod: 26 | Performed by: RADIOLOGY

## 2021-03-12 PROCEDURE — 77427 RADIATION TX MANAGEMENT X5: CPT | Performed by: RADIOLOGY

## 2021-03-12 PROCEDURE — 77386 HC IMRT TREATMENT DELIVERY, COMPLEX: CPT | Performed by: RADIOLOGY

## 2021-03-12 PROCEDURE — 77336 RADIATION PHYSICS CONSULT: CPT | Performed by: RADIOLOGY

## 2021-03-15 ENCOUNTER — APPOINTMENT (OUTPATIENT)
Dept: RADIATION ONCOLOGY | Facility: CLINIC | Age: 56
End: 2021-03-15
Attending: RADIOLOGY
Payer: COMMERCIAL

## 2021-03-15 PROCEDURE — 77386 HC IMRT TREATMENT DELIVERY, COMPLEX: CPT | Performed by: RADIOLOGY

## 2021-03-15 PROCEDURE — 77014 PR CT GUIDE FOR PLACEMENT RADIATION THERAPY FIELDS: CPT | Mod: 26 | Performed by: RADIOLOGY

## 2021-03-16 ENCOUNTER — APPOINTMENT (OUTPATIENT)
Dept: RADIATION ONCOLOGY | Facility: CLINIC | Age: 56
End: 2021-03-16
Attending: RADIOLOGY
Payer: COMMERCIAL

## 2021-03-16 PROCEDURE — 77386 HC IMRT TREATMENT DELIVERY, COMPLEX: CPT | Performed by: RADIOLOGY

## 2021-03-16 PROCEDURE — 77014 PR CT GUIDE FOR PLACEMENT RADIATION THERAPY FIELDS: CPT | Mod: 26 | Performed by: RADIOLOGY

## 2021-03-17 ENCOUNTER — APPOINTMENT (OUTPATIENT)
Dept: RADIATION ONCOLOGY | Facility: CLINIC | Age: 56
End: 2021-03-17
Attending: RADIOLOGY
Payer: COMMERCIAL

## 2021-03-17 PROCEDURE — 77014 PR CT GUIDE FOR PLACEMENT RADIATION THERAPY FIELDS: CPT | Mod: 26 | Performed by: RADIOLOGY

## 2021-03-17 PROCEDURE — 77386 HC IMRT TREATMENT DELIVERY, COMPLEX: CPT | Performed by: RADIOLOGY

## 2021-03-18 ENCOUNTER — OFFICE VISIT (OUTPATIENT)
Dept: RADIATION ONCOLOGY | Facility: CLINIC | Age: 56
End: 2021-03-18
Attending: RADIOLOGY
Payer: COMMERCIAL

## 2021-03-18 VITALS
SYSTOLIC BLOOD PRESSURE: 151 MMHG | WEIGHT: 210 LBS | DIASTOLIC BLOOD PRESSURE: 95 MMHG | HEART RATE: 77 BPM | BODY MASS INDEX: 34.95 KG/M2

## 2021-03-18 DIAGNOSIS — D32.0 PRIMARY MENINGIOMA OF OPTIC NERVE SHEATH (H): Primary | ICD-10-CM

## 2021-03-18 PROCEDURE — 77386 HC IMRT TREATMENT DELIVERY, COMPLEX: CPT | Performed by: RADIOLOGY

## 2021-03-19 ENCOUNTER — APPOINTMENT (OUTPATIENT)
Dept: RADIATION ONCOLOGY | Facility: CLINIC | Age: 56
End: 2021-03-19
Attending: RADIOLOGY
Payer: COMMERCIAL

## 2021-03-19 PROCEDURE — 77336 RADIATION PHYSICS CONSULT: CPT | Performed by: RADIOLOGY

## 2021-03-19 PROCEDURE — 77014 PR CT GUIDE FOR PLACEMENT RADIATION THERAPY FIELDS: CPT | Mod: 26 | Performed by: RADIOLOGY

## 2021-03-19 PROCEDURE — 77427 RADIATION TX MANAGEMENT X5: CPT | Performed by: RADIOLOGY

## 2021-03-19 PROCEDURE — 77386 HC IMRT TREATMENT DELIVERY, COMPLEX: CPT | Performed by: RADIOLOGY

## 2021-03-19 NOTE — PROGRESS NOTES
RADIATION ONCOLOGY WEEKLY ON TREATMENT VISIT   Encounter Date: 2021    Patient Name: Shakila Kapoor  MRN: [id number]  : 1965     Disease and Stage: Right optic nerve sheath meningioma  Treatment Site: Right optic nerve  Current Dose/Planned Total Dose: 1620 / 5040 cGy  Daily Fraction Size: 180 cGy/day, 5 times/week  Concurrent Chemotherapy: No    Treatment Summary:    3/8/2021: Initiation of radiotherapy    3/11/2021: Weekly RT visit. Current dose of 720 cGy. Tolerating treatment well.    3/18/2021: Weekly RT visit. Current dose of 1620 cGy. Tolerating treatment well.    ED visits/Hospitalizations:  None    Missed Treatments:  None    Subjective: Ms. Kapoor presents to clinic today for her weekly on-treatment visit. She continues to tolerate radiotherapy very well and has no pressing concerns or complaints on examination today. She does describe some mild fatigue however is unsure if this is related to her radiation or her baseline fatigue from her MS. Her remaining ROS are otherwise negative.    ROS:   Constitutional  Pain (0-10): 0  Fatigue: Mild    Nutrition  Diet: Regular    CNS  Headaches: None    ENT  Mucositis: None    Cardiopulmonary  Dyspnea: None  Chest pain: None    GI  Nausea/vomiting: None    Integumentary  Dermatitis: None    Objective:   Weight: 95.3 kg  BP: 151/95  Pulse: 77    General: 55-year-old female seated comfortably in a chair in no acute distress  HEENT: NC/AT.  No alopecia.  EOMI.    Pulmonary: No wheezing, stridor or respiratory distress  Skin: No erythema    Treatment-related toxicities (CTCAE v5.0):  Fatigue: Grade 1    Assessment:    Ms. Kapoor is a 55 year old female with a right optic nerve sheath meningioma. She is receiving definitive radiation therapy for local control purposes. She is tolerating radiotherapy very well with mild associated fatigue.    Plan:   Right optic nerve sheath meningioma:    Continue  radiotherapy    Fluids/Electrolytes/Nutrition:    Continue diet as tolerated    Dry eye prophylaxis:    Continue saline eyedrops to the right eye as needed throughout the day with Refresh PM drops to the right eye nightly    Mosaiq chart and setup information reviewed  MVCT images reviewed    Medication Review  Med list reviewed with patient?: Yes    Abdulaziz Pride MD/PhD    Dept of Radiation Oncology  [facility]

## 2021-03-22 ENCOUNTER — APPOINTMENT (OUTPATIENT)
Dept: RADIATION ONCOLOGY | Facility: CLINIC | Age: 56
End: 2021-03-22
Attending: RADIOLOGY
Payer: COMMERCIAL

## 2021-03-22 PROCEDURE — 77014 PR CT GUIDE FOR PLACEMENT RADIATION THERAPY FIELDS: CPT | Mod: 26 | Performed by: RADIOLOGY

## 2021-03-22 PROCEDURE — 77386 HC IMRT TREATMENT DELIVERY, COMPLEX: CPT | Performed by: RADIOLOGY

## 2021-03-23 ENCOUNTER — APPOINTMENT (OUTPATIENT)
Dept: RADIATION ONCOLOGY | Facility: CLINIC | Age: 56
End: 2021-03-23
Attending: RADIOLOGY
Payer: COMMERCIAL

## 2021-03-23 PROCEDURE — 77386 HC IMRT TREATMENT DELIVERY, COMPLEX: CPT | Performed by: RADIOLOGY

## 2021-03-23 PROCEDURE — 77014 PR CT GUIDE FOR PLACEMENT RADIATION THERAPY FIELDS: CPT | Mod: 26 | Performed by: RADIOLOGY

## 2021-03-24 ENCOUNTER — APPOINTMENT (OUTPATIENT)
Dept: RADIATION ONCOLOGY | Facility: CLINIC | Age: 56
End: 2021-03-24
Attending: RADIOLOGY
Payer: COMMERCIAL

## 2021-03-24 PROCEDURE — 77386 HC IMRT TREATMENT DELIVERY, COMPLEX: CPT | Performed by: RADIOLOGY

## 2021-03-24 PROCEDURE — 77014 PR CT GUIDE FOR PLACEMENT RADIATION THERAPY FIELDS: CPT | Mod: 26 | Performed by: RADIOLOGY

## 2021-03-25 ENCOUNTER — APPOINTMENT (OUTPATIENT)
Dept: RADIATION ONCOLOGY | Facility: CLINIC | Age: 56
End: 2021-03-25
Attending: RADIOLOGY
Payer: COMMERCIAL

## 2021-03-25 PROCEDURE — 77014 PR CT GUIDE FOR PLACEMENT RADIATION THERAPY FIELDS: CPT | Mod: 26 | Performed by: RADIOLOGY

## 2021-03-25 PROCEDURE — 77386 HC IMRT TREATMENT DELIVERY, COMPLEX: CPT | Performed by: RADIOLOGY

## 2021-03-26 ENCOUNTER — OFFICE VISIT (OUTPATIENT)
Dept: RADIATION ONCOLOGY | Facility: CLINIC | Age: 56
End: 2021-03-26
Attending: RADIOLOGY
Payer: COMMERCIAL

## 2021-03-26 VITALS
WEIGHT: 205.7 LBS | BODY MASS INDEX: 34.27 KG/M2 | OXYGEN SATURATION: 96 % | HEIGHT: 65 IN | RESPIRATION RATE: 16 BRPM | SYSTOLIC BLOOD PRESSURE: 136 MMHG | HEART RATE: 93 BPM | DIASTOLIC BLOOD PRESSURE: 86 MMHG

## 2021-03-26 DIAGNOSIS — D32.0 PRIMARY MENINGIOMA OF OPTIC NERVE SHEATH (H): Primary | ICD-10-CM

## 2021-03-26 PROCEDURE — 77014 PR CT GUIDE FOR PLACEMENT RADIATION THERAPY FIELDS: CPT | Mod: 26 | Performed by: RADIOLOGY

## 2021-03-26 PROCEDURE — 77427 RADIATION TX MANAGEMENT X5: CPT | Performed by: RADIOLOGY

## 2021-03-26 PROCEDURE — 77336 RADIATION PHYSICS CONSULT: CPT | Performed by: RADIOLOGY

## 2021-03-26 PROCEDURE — 77386 HC IMRT TREATMENT DELIVERY, COMPLEX: CPT | Performed by: RADIOLOGY

## 2021-03-26 ASSESSMENT — PAIN SCALES - GENERAL: PAINLEVEL: NO PAIN (0)

## 2021-03-26 ASSESSMENT — MIFFLIN-ST. JEOR: SCORE: 1538.93

## 2021-03-26 NOTE — PROGRESS NOTES
RADIATION ONCOLOGY WEEKLY ON TREATMENT VISIT   Encounter Date: 2021    Patient Name: Shakila Kapoor  MRN: [id number]  : 1965     Disease and Stage: Right optic nerve sheath meningioma  Treatment Site: Right optic nerve  Current Dose/Planned Total Dose: 2700 / 5040 cGy  Daily Fraction Size: 180 cGy/day, 5 times/week  Concurrent Chemotherapy: No    Treatment Summary:    3/8/2021: Initiation of radiotherapy    3/11/2021: Weekly RT visit. Current dose of 720 cGy. Tolerating treatment well.    3/18/2021: Weekly RT visit. Current dose of 1620 cGy. Tolerating treatment well.    ED visits/Hospitalizations:  None    Missed Treatments:  None    Subjective: Ms. Kapoor presents to clinic today for her weekly on-treatment visit. She continues to tolerate treatment well and has no acute radiation-induced toxicities.    ROS:   Constitutional  Pain (0-10): 0  Fatigue: None    Nutrition  Diet: None    CNS  Headaches: None    ENT  Mucositis: None    Cardiopulmonary  Dyspnea: None  Chest pain: None    GI  Nausea/vomiting: None    Integumentary  Dermatitis: None    Objective:   Weight: 93.3 kg  BP: 136/86  Pulse: 93    General: Healthy-appearing 55-year-old female seated comfortably in an examination chair in no acute distress  HEENT: NC/AT.  EOMI.  No alopecia  Pulmonary: No wheezing, stridor or respiratory distress  Skin: No erythema    Treatment-related toxicities (CTCAE v5.0):  None    Assessment:    Ms. Kapoor is a 55 year old female with a right optic nerve sheath meningioma. She is receiving definitive radiation therapy for local control purposes. She continues to tolerate treatment well and has no acute radiation-induced toxicities.    Plan:   Right optic nerve sheath meningioma:    Continue radiotherapy    Fluids/Electrolytes/Nutrition:    Continue diet as tolerated    Dry eye prophylaxis:    Continue saline eyedrops throughout the day with application of Refresh p.m. drops to the right eye  nightly    Mosaiq chart and setup information reviewed  MVCT images reviewed    Medication Review  Med list reviewed with patient?: Yes    Abdulaziz Pride MD/PhD    Dept of Radiation Oncology  [facility]

## 2021-03-29 ENCOUNTER — APPOINTMENT (OUTPATIENT)
Dept: RADIATION ONCOLOGY | Facility: CLINIC | Age: 56
End: 2021-03-29
Attending: RADIOLOGY
Payer: COMMERCIAL

## 2021-03-29 PROCEDURE — 77386 HC IMRT TREATMENT DELIVERY, COMPLEX: CPT | Performed by: RADIOLOGY

## 2021-03-29 PROCEDURE — 77014 PR CT GUIDE FOR PLACEMENT RADIATION THERAPY FIELDS: CPT | Mod: 26 | Performed by: RADIOLOGY

## 2021-03-30 ENCOUNTER — APPOINTMENT (OUTPATIENT)
Dept: RADIATION ONCOLOGY | Facility: CLINIC | Age: 56
End: 2021-03-30
Attending: RADIOLOGY
Payer: COMMERCIAL

## 2021-03-30 PROCEDURE — 77014 PR CT GUIDE FOR PLACEMENT RADIATION THERAPY FIELDS: CPT | Mod: 26 | Performed by: RADIOLOGY

## 2021-03-30 PROCEDURE — 77386 HC IMRT TREATMENT DELIVERY, COMPLEX: CPT | Performed by: RADIOLOGY

## 2021-03-31 ENCOUNTER — APPOINTMENT (OUTPATIENT)
Dept: RADIATION ONCOLOGY | Facility: CLINIC | Age: 56
End: 2021-03-31
Attending: RADIOLOGY
Payer: COMMERCIAL

## 2021-03-31 PROCEDURE — 77014 PR CT GUIDE FOR PLACEMENT RADIATION THERAPY FIELDS: CPT | Mod: 26 | Performed by: RADIOLOGY

## 2021-03-31 PROCEDURE — 77386 HC IMRT TREATMENT DELIVERY, COMPLEX: CPT | Performed by: RADIOLOGY

## 2021-04-01 ENCOUNTER — OFFICE VISIT (OUTPATIENT)
Dept: RADIATION ONCOLOGY | Facility: CLINIC | Age: 56
End: 2021-04-01
Attending: RADIOLOGY
Payer: COMMERCIAL

## 2021-04-01 VITALS — DIASTOLIC BLOOD PRESSURE: 85 MMHG | BODY MASS INDEX: 34.16 KG/M2 | SYSTOLIC BLOOD PRESSURE: 137 MMHG | WEIGHT: 205.3 LBS

## 2021-04-01 DIAGNOSIS — D32.0 PRIMARY MENINGIOMA OF OPTIC NERVE SHEATH (H): Primary | ICD-10-CM

## 2021-04-01 PROCEDURE — 77386 HC IMRT TREATMENT DELIVERY, COMPLEX: CPT | Performed by: RADIOLOGY

## 2021-04-01 NOTE — PROGRESS NOTES
RADIATION ONCOLOGY WEEKLY ON TREATMENT VISIT   Encounter Date: 2021    Patient Name: Shakila Kapoor  MRN: [id number]  : 1965     Disease and Stage: Right optic nerve sheath meningioma  Treatment Site: Right optic nerve  Current Dose/Planned Total Dose: 3420 / 5040 cGy  Daily Fraction Size: 180 cGy/day, 5 times/week  Concurrent Chemotherapy: No    Treatment Summary:    3/8/2021: Initiation of radiotherapy    3/11/2021: Weekly RT visit. Current dose of 720 cGy. Tolerating treatment well.    3/18/2021: Weekly RT visit. Current dose of 1620 cGy. Tolerating treatment well.    3/26/2021: Weekly RT visit. Current dose of 2700 cGy. Tolerating treatment well.    2021: Weekly RT visit. Current dose of 3420 cGy. Mild to moderate fatigue.    ED visits/Hospitalizations:  None    Missed Treatments:  None    Subjective: Ms. Kapoor presents to clinic today for her weekly on-treatment visit. She is tolerating treatment well thus far and notes only mildly increased fatigue over the past week. Her vision otherwise has remained stable and her remaining ROS are negative.    ROS:   Constitutional  Pain (0-10): 0  Fatigue: Mild to moderate    Nutrition  Diet: None    CNS  Headaches: None    ENT  Mucositis: None    Cardiopulmonary  Dyspnea: None  Chest pain: None    GI  Nausea/vomiting: None    Integumentary  Dermatitis: None    Objective:   Weight: 93.1 kg  BP: 137/85    General: Healthy-appearing 55-year-old female seated comfortably in a chair no acute distress  HEENT: NC/AT.  EOMI.  No alopecia.  Pulmonary: No wheezing, stridor or respiratory distress  Skin: Normal color and turgor.  No erythema.    Treatment-related toxicities (CTCAE v5.0):  Fatigue: Grade 1    Assessment:    Ms. Kapoor is a 55 year old female with a right optic nerve sheath meningioma. She is receiving definitive radiation therapy for local control purposes. She is tolerating treatment well with mild radiation-induced fatigue.    Plan:   Right  optic nerve sheath meningioma:    Continue radiotherapy    Fluids/Electrolytes/Nutrition:    Continue diet as tolerated    Dry eye prophylaxis:    Continue saline eyedrops throughout the day with application of Refresh p.m. drops to the right eye nightly    Mosaiq chart and setup information reviewed  MVCT images reviewed    Medication Review  Med list reviewed with patient?: Yes    Abdulaziz Pride MD/PhD    Dept of Radiation Oncology  [facility]

## 2021-04-02 ENCOUNTER — APPOINTMENT (OUTPATIENT)
Dept: RADIATION ONCOLOGY | Facility: CLINIC | Age: 56
End: 2021-04-02
Attending: RADIOLOGY
Payer: COMMERCIAL

## 2021-04-02 PROCEDURE — 77386 HC IMRT TREATMENT DELIVERY, COMPLEX: CPT | Performed by: RADIOLOGY

## 2021-04-02 PROCEDURE — 77427 RADIATION TX MANAGEMENT X5: CPT | Performed by: RADIOLOGY

## 2021-04-02 PROCEDURE — 77336 RADIATION PHYSICS CONSULT: CPT | Performed by: RADIOLOGY

## 2021-04-02 PROCEDURE — 77014 PR CT GUIDE FOR PLACEMENT RADIATION THERAPY FIELDS: CPT | Mod: 26 | Performed by: RADIOLOGY

## 2021-04-05 ENCOUNTER — APPOINTMENT (OUTPATIENT)
Dept: RADIATION ONCOLOGY | Facility: CLINIC | Age: 56
End: 2021-04-05
Attending: RADIOLOGY
Payer: COMMERCIAL

## 2021-04-05 PROCEDURE — 77014 PR CT GUIDE FOR PLACEMENT RADIATION THERAPY FIELDS: CPT | Mod: 26 | Performed by: RADIOLOGY

## 2021-04-05 PROCEDURE — 77386 HC IMRT TREATMENT DELIVERY, COMPLEX: CPT | Performed by: RADIOLOGY

## 2021-04-06 ENCOUNTER — APPOINTMENT (OUTPATIENT)
Dept: RADIATION ONCOLOGY | Facility: CLINIC | Age: 56
End: 2021-04-06
Attending: RADIOLOGY
Payer: COMMERCIAL

## 2021-04-06 PROCEDURE — 77014 PR CT GUIDE FOR PLACEMENT RADIATION THERAPY FIELDS: CPT | Mod: 26 | Performed by: RADIOLOGY

## 2021-04-06 PROCEDURE — 77386 HC IMRT TREATMENT DELIVERY, COMPLEX: CPT | Performed by: RADIOLOGY

## 2021-04-07 ENCOUNTER — APPOINTMENT (OUTPATIENT)
Dept: RADIATION ONCOLOGY | Facility: CLINIC | Age: 56
End: 2021-04-07
Attending: RADIOLOGY
Payer: COMMERCIAL

## 2021-04-07 PROCEDURE — 77014 PR CT GUIDE FOR PLACEMENT RADIATION THERAPY FIELDS: CPT | Mod: 26 | Performed by: RADIOLOGY

## 2021-04-07 PROCEDURE — 77386 HC IMRT TREATMENT DELIVERY, COMPLEX: CPT | Performed by: RADIOLOGY

## 2021-04-08 ENCOUNTER — OFFICE VISIT (OUTPATIENT)
Dept: RADIATION ONCOLOGY | Facility: CLINIC | Age: 56
End: 2021-04-08
Attending: RADIOLOGY
Payer: COMMERCIAL

## 2021-04-08 VITALS
WEIGHT: 205 LBS | BODY MASS INDEX: 34.11 KG/M2 | HEART RATE: 88 BPM | SYSTOLIC BLOOD PRESSURE: 132 MMHG | DIASTOLIC BLOOD PRESSURE: 76 MMHG

## 2021-04-08 DIAGNOSIS — D32.0 PRIMARY MENINGIOMA OF OPTIC NERVE SHEATH (H): Primary | ICD-10-CM

## 2021-04-08 PROCEDURE — 77386 HC IMRT TREATMENT DELIVERY, COMPLEX: CPT | Performed by: RADIOLOGY

## 2021-04-08 NOTE — PROGRESS NOTES
[facility] PHYSICIANS  SPECIALIZING IN BREAKTHROUGHS  Radiation Oncology    On Treatment Visit Note      Shakila Kapoor      Date: 2021   MRN: [id number]   : 1965  Diagnosis: Meningioma      Reason for Visit:  On Radiation Treatment Visit     Treatment Summary to Date  Treatment Site: Right optic Current Dose: 4320/5040 cGy Fractions:       Chemotherapy  Chemo concurrent with radx?: No    ED Visit/Hosiptal Admission: None    Treatment Breaks: None       Subjective:   Shakila continues to do well.  She has no complaints.  She denies any side effects from the treatment.  The blurriness in the right eye has been stable.     Nursing ROS:   Nutrition Alteration  Diet Type: Patient's Preference  Skin  Skin Reaction: 0 - No changes     ENT and Mouth Exam  Mucositis - Current: 0 - None   ENT/Mouth Note: Right eye blurriness  Cardiovascular  Respiratory effort: 1 - Normal - without distress  Gastrointestinal  Nausea: 0 - None  Vomitin - No vomiting (ons)  Diarrhea: 0 - None  Genitourinary  Urinary Status: 0 - Normal  Psychosocial  Mood - Anxiety: 0 - Normal  Pain Assessment  0-10 Pain Scale: 0      Objective:   /76   Pulse 88   Wt 93 kg (205 lb)   BMI 34.11 kg/m    Gen: Appears well, in no acute distress  Skin: No erythema    Labs:  CBC RESULTS: No results for input(s): WBC, RBC, HGB, HCT, MCV, MCH, MCHC, RDW, PLT in the last 28827 hours.  ELECTROLYTES:  No results for input(s): NA, POTASSIUM, CHLORIDE, ANOOP, CO2, BUN, CR, GLC in the last 26958 hours.    Assessment:    Tolerating radiation therapy well.  All questions and concerns addressed.    Toxicities:  Dermatitis: Grade 0: No toxicity    Plan:   1. Continue current therapy.    2. Will see Dr. Pride next week for EOTV.       Mosaiq chart and setup information reviewed  MVCT/IGRT images checked    Medication Review  Med list reviewed with patient?: Yes    Educational Topic Discussed  Education Instructions:  Reviewed        Irving Ernst MD/PhD  [phone] clinic  Pager [phone]    Please do not send letter to referring physician.

## 2021-04-08 NOTE — LETTER
2021         RE: Shakila Kapoor  [address] [place] [address]        Dear Colleague,    Thank you for referring your patient, Shakila Kapoor, to the [facility] RADIATION ONCOLOGY. Please see a copy of my visit note below.    [facility] PHYSICIANS  SPECIALIZING IN BREAKTHROUGHS  Radiation Oncology    On Treatment Visit Note      Shakila Kapoor      Date: 2021   MRN: [id number]   : 1965  Diagnosis: Meningioma      Reason for Visit:  On Radiation Treatment Visit     Treatment Summary to Date  Treatment Site: Right optic Current Dose: 4320/5040 cGy Fractions:       Chemotherapy  Chemo concurrent with radx?: No    ED Visit/Hosiptal Admission: None    Treatment Breaks: None       Subjective:   Shakila continues to do well.  She has no complaints.  She denies any side effects from the treatment.  The blurriness in the right eye has been stable.     Nursing ROS:   Nutrition Alteration  Diet Type: Patient's Preference  Skin  Skin Reaction: 0 - No changes     ENT and Mouth Exam  Mucositis - Current: 0 - None   ENT/Mouth Note: Right eye blurriness  Cardiovascular  Respiratory effort: 1 - Normal - without distress  Gastrointestinal  Nausea: 0 - None  Vomitin - No vomiting (ons)  Diarrhea: 0 - None  Genitourinary  Urinary Status: 0 - Normal  Psychosocial  Mood - Anxiety: 0 - Normal  Pain Assessment  0-10 Pain Scale: 0      Objective:   /76   Pulse 88   Wt 93 kg (205 lb)   BMI 34.11 kg/m    Gen: Appears well, in no acute distress  Skin: No erythema    Labs:  CBC RESULTS: No results for input(s): WBC, RBC, HGB, HCT, MCV, MCH, MCHC, RDW, PLT in the last 51408 hours.  ELECTROLYTES:  No results for input(s): NA, POTASSIUM, CHLORIDE, ANOOP, CO2, BUN, CR, GLC in the last 25080 hours.    Assessment:    Tolerating radiation therapy well.  All questions and concerns addressed.    Toxicities:  Dermatitis: Grade 0: No toxicity    Plan:   1. Continue current  therapy.    2. Will see Dr. Pride next week for EOTV.       Mosaiq chart and setup information reviewed  MVCT/IGRT images checked    Medication Review  Med list reviewed with patient?: Yes    Educational Topic Discussed  Education Instructions: Reviewed        Irving Ernst MD/PhD  [phone] clinic  Pager [phone]    Please do not send letter to referring physician.        Again, thank you for allowing me to participate in the care of your patient.        Sincerely,        Irving Ernst MD

## 2021-04-08 NOTE — LETTER
Date:April 9, 2021      Provider requested that no letter be sent. Do not send.       [facility]

## 2021-04-09 ENCOUNTER — APPOINTMENT (OUTPATIENT)
Dept: RADIATION ONCOLOGY | Facility: CLINIC | Age: 56
End: 2021-04-09
Attending: RADIOLOGY
Payer: COMMERCIAL

## 2021-04-09 PROCEDURE — 77336 RADIATION PHYSICS CONSULT: CPT | Performed by: RADIOLOGY

## 2021-04-09 PROCEDURE — 77014 PR CT GUIDE FOR PLACEMENT RADIATION THERAPY FIELDS: CPT | Mod: 26 | Performed by: RADIOLOGY

## 2021-04-09 PROCEDURE — 77386 HC IMRT TREATMENT DELIVERY, COMPLEX: CPT | Performed by: RADIOLOGY

## 2021-04-09 PROCEDURE — 77427 RADIATION TX MANAGEMENT X5: CPT | Performed by: RADIOLOGY

## 2021-04-12 ENCOUNTER — APPOINTMENT (OUTPATIENT)
Dept: RADIATION ONCOLOGY | Facility: CLINIC | Age: 56
End: 2021-04-12
Attending: RADIOLOGY
Payer: COMMERCIAL

## 2021-04-12 PROCEDURE — 77014 PR CT GUIDE FOR PLACEMENT RADIATION THERAPY FIELDS: CPT | Mod: 26 | Performed by: RADIOLOGY

## 2021-04-12 PROCEDURE — 77386 HC IMRT TREATMENT DELIVERY, COMPLEX: CPT | Performed by: RADIOLOGY

## 2021-04-13 ENCOUNTER — APPOINTMENT (OUTPATIENT)
Dept: RADIATION ONCOLOGY | Facility: CLINIC | Age: 56
End: 2021-04-13
Attending: RADIOLOGY
Payer: COMMERCIAL

## 2021-04-13 PROCEDURE — 77014 PR CT GUIDE FOR PLACEMENT RADIATION THERAPY FIELDS: CPT | Mod: 26 | Performed by: RADIOLOGY

## 2021-04-13 PROCEDURE — 77386 HC IMRT TREATMENT DELIVERY, COMPLEX: CPT | Performed by: RADIOLOGY

## 2021-04-14 ENCOUNTER — APPOINTMENT (OUTPATIENT)
Dept: RADIATION ONCOLOGY | Facility: CLINIC | Age: 56
End: 2021-04-14
Attending: RADIOLOGY
Payer: COMMERCIAL

## 2021-04-14 VITALS — WEIGHT: 204 LBS | BODY MASS INDEX: 33.95 KG/M2

## 2021-04-14 DIAGNOSIS — D32.0 PRIMARY MENINGIOMA OF OPTIC NERVE SHEATH (H): Primary | ICD-10-CM

## 2021-04-14 PROCEDURE — 77336 RADIATION PHYSICS CONSULT: CPT | Performed by: RADIOLOGY

## 2021-04-14 PROCEDURE — 77386 HC IMRT TREATMENT DELIVERY, COMPLEX: CPT | Performed by: RADIOLOGY

## 2021-04-14 PROCEDURE — 77427 RADIATION TX MANAGEMENT X5: CPT | Performed by: RADIOLOGY

## 2021-04-15 NOTE — PROGRESS NOTES
RADIATION ONCOLOGY WEEKLY ON TREATMENT VISIT   Encounter Date: 2021    Patient Name: Shakila Kapoor  MRN: [id number]  : 1965     Disease and Stage: Right optic nerve sheath meningioma  Treatment Site: Right optic nerve  Current Dose/Planned Total Dose: 5040 / 5040 cGy  Daily Fraction Size: 180 cGy/day, 5 times/week  Concurrent Chemotherapy: No    Treatment Summary:    3/8/2021: Initiation of radiotherapy    3/11/2021: Weekly RT visit. Current dose of 720 cGy. Tolerating treatment well.    3/18/2021: Weekly RT visit. Current dose of 1620 cGy. Tolerating treatment well.    3/26/2021: Weekly RT visit. Current dose of 2700 cGy. Tolerating treatment well.    2021: Weekly RT visit. Current dose of 3420 cGy. Mild to moderate fatigue.    2021: Weekly RT visit. Current dose of 4230 cGy. Stable mild to moderate fatigue.    2021: Weekly RT visit. Current dose of 5040 cGy. Mildly increased blurred vision in the right eye.    ED visits/Hospitalizations:  None    Missed Treatments:  None    Subjective: Ms. Kapoor presents to clinic today for her final weekly on-treatment. She notes slightly subjectively increased blurred vision within the right eye. She otherwise continues to have stable, mild to moderate fatigue with her remaining ROS negative.    ROS:   Constitutional  Pain (0-10): 0  Fatigue: Mild to moderate    Nutrition  Diet: None    CNS  Headaches: None    ENT  Mucositis: None    Cardiopulmonary  Dyspnea: None  Chest pain: None    GI  Nausea/vomiting: None    Integumentary  Dermatitis: None    Objective:   Weight: 92.5 kg    General: Healthy-appearing 55-year-old female seated comfortably in a chair no acute distress  HEENT: NC/AT. EOMI. No alopecia.  Pulmonary: No wheezing, stridor or respiratory distress  Skin: Normal color and turgor. No erythema.    Treatment-related toxicities (CTCAE v5.0):  Fatigue: Grade 1    Assessment:    Ms. Kapoor is a 55 year old female with a right optic nerve  sheath meningioma. She is receiving definitive radiation therapy for local control purposes. She has tolerated treatment very well with mild radiation-induced fatigue.    Plan:   Right optic nerve sheath meningioma:    Complete radiotherapy today     Follow-up in radiation oncology clinic in 3 months with a repeat MRI prior    Fluids/Electrolytes/Nutrition:    Continue regular diet    Dry eye prophylaxis:    Continue saline eyedrops throughout the day with application of Refresh p.m. drops to the right eye nightly    Blurred vision:    Instructed Ms. Kapoor to contact our clinic if her symptoms persist or worsen for consideration of initiation of low-dose steroids    Mosaiq chart and setup information reviewed  MVCT images reviewed    Medication Review  Med list reviewed with patient?: Yes    Abdulaziz Pride MD/PhD    Dept of Radiation Oncology  [facility]

## 2021-04-20 NOTE — PROCEDURES
Radiotherapy Treatment Summary          Date of Report: April 15, 2021     PATIENT: EVERTON MOONEY  MEDICAL RECORD NO: [id number]  : 1965     DIAGNOSIS: D32.0 Benign neoplasm of cerebral meninges  INTENT OF RADIOTHERAPY: Local control  PATHOLOGY: Meningioma  CONCURRENT SYSTEMIC THERAPY: None     Details of the treatments summarized below are found in records kept in the Department of Radiation Oncology at [facility].     Treatment Summary:  Treatment Site Dose  Modality From  To  Elapsed Days Fx.  R optic nerve   5,040 cGy 06 X   3/08/2021  2021  37  28     Dose per Fraction:   180 cGy     COMMENTS:  Ms. Mooney is a 55 year old female with a PMH significant for relapsing multiple sclerosis initially diagnosed at age 21. She was diagnosed with a right optic nerve sheath meningioma in 2021 with imaging demonstrating thickening and enhancement of the right optic nerve sheath beginning just proximal to the entrance to the optic canal and extending into the intraorbital segment near the orbital apex.      The tumor was treated to a dose of 50.4 Gy in 28 once-daily fractions using 6 MV photons delivered via a helical tomotherapy technique.      Ms. Mooney tolerated treatment very well with the development of mild grade 1 fatigue by the completion of therapy. She completed all of her radiation fractions as scheduled and did not have any unplanned breaks in treatment.     Acute Toxicity Profile (CTCAE v5.0)  Fatigue: Grade 1     PAIN MANAGEMENT:   No symptoms requiring medical management      FOLLOW UP PLAN:   Follow-up in radiation oncology clinic in 3 months with a repeat MRI prior     Staff Physician: Abdulaziz Pride MD, PhD  Physicist: Rosaura Ascencio PhD     CC:   Samuel Soto MD                                    Radiation Oncology:  [facility] [address] [place] [address]

## 2021-04-27 ENCOUNTER — TELEPHONE (OUTPATIENT)
Dept: OPHTHALMOLOGY | Facility: CLINIC | Age: 56
End: 2021-04-27

## 2021-04-27 NOTE — TELEPHONE ENCOUNTER
Antwon Shakila -[phone]    Pt 2 weeks finished up with radiation for meningioma     Pt has been having right eye blurrier vision since radiation completed    Past 2 mornings after waking up and starts walking to restroom can have even mor blurring of vision-- more white out off vision last 5 minutes before going back to baseline    No eye pain  No redness    Will review plan with neuro-ophthalmology team and call back    Luis Valdivia RN 2:08 PM 04/27/21        [facility] Call Center    Phone Message    May a detailed message be left on voicemail: yes     Reason for Call: Other:   Pt completed radiation therapy for a couple wks now and the last 2 mornings, pt was unable to see out of her right eye. The vision comes back after 5 minutes or so.    Pt would like to know if she should be concerned?     Please follow-up.     Action Taken: Other:  eye    Travel Screening: Not Applicable

## 2021-04-27 NOTE — TELEPHONE ENCOUNTER
Called the patient back. She reports that during radiation and afterwards she develops blurry vision in the right eye when she wakes up in the morning and goes to the bathroom. It lasts for 5 minutes and it goes back to baseline. Informed her that is it not worrisome. She will contact us if she worsens or if she develops vision loss. She has a follow up appointment in July

## 2021-05-11 ENCOUNTER — TRANSFERRED RECORDS (OUTPATIENT)
Dept: HEALTH INFORMATION MANAGEMENT | Facility: CLINIC | Age: 56
End: 2021-05-11

## 2021-05-11 LAB
ALT SERPL-CCNC: 23 U/L (ref 6–29)
AST SERPL-CCNC: 20 U/L (ref 10–35)

## 2021-06-21 ENCOUNTER — TRANSFERRED RECORDS (OUTPATIENT)
Dept: HEALTH INFORMATION MANAGEMENT | Facility: CLINIC | Age: 56
End: 2021-06-21

## 2021-06-30 ENCOUNTER — TRANSFERRED RECORDS (OUTPATIENT)
Dept: HEALTH INFORMATION MANAGEMENT | Facility: CLINIC | Age: 56
End: 2021-06-30

## 2021-07-22 NOTE — TELEPHONE ENCOUNTER
RECORDS RECEIVED FROM: Self   Date of Appt: 9/1/21   NOTES (FOR ALL VISITS) STATUS DETAILS   OFFICE NOTE from referring provider N/A    OFFICE NOTE from other specialist Internal/Received Dr Carlos Soto @ [facility] Ophthalmology:  1/13/21    Dr Mckeon @ [facility]:  6/30/21  4/7/21  10/28/20  7/27/20  5/28/20  9/30/19  9/9/19   DISCHARGE SUMMARY from hospital N/A    DISCHARGE REPORT from the [facility]:  9/5/19   OPERATIVE REPORT N/A    MEDICATION LIST Internal    IMAGING  (FOR ALL VISITS)     EMG N/A    EEG N/A    LUMBAR PUNCTURE N/A    SONYA SCAN N/A    ULTRASOUND (CAROTID BILAT) *VASCULAR* N/A    MRI (HEAD, NECK, SPINE) Received [facility]:  MRI Brain 7/28/21 *scheduled*    [facility]:  MRI Brain 2/25/21  MRI Orbits 1/27/21    [facility]:  MRI Thoracic Spine 6/21/21  MRI Cervical Spine 6/21/21  MRI Brain 6/21/21  MRI Cervical Spine 9/16/19  MRI Thoracic Spine 9/16/19     [facility]:  MRI Head 9/5/19   CT (HEAD, NECK, SPINE) N/A       Action 7/22/21 MV 10.16am   Action Taken Imaging request faxed to [facility]      Action 7/27/21 MV 7.55am   Action Taken Records received from [facility] - rafael in PACS

## 2021-07-28 ENCOUNTER — ANCILLARY PROCEDURE (OUTPATIENT)
Dept: MRI IMAGING | Facility: CLINIC | Age: 56
End: 2021-07-28
Attending: RADIOLOGY
Payer: COMMERCIAL

## 2021-07-28 ENCOUNTER — OFFICE VISIT (OUTPATIENT)
Dept: OPHTHALMOLOGY | Facility: CLINIC | Age: 56
End: 2021-07-28
Payer: COMMERCIAL

## 2021-07-28 DIAGNOSIS — D32.0 PRIMARY MENINGIOMA OF OPTIC NERVE SHEATH (H): Primary | ICD-10-CM

## 2021-07-28 DIAGNOSIS — D32.0 PRIMARY MENINGIOMA OF OPTIC NERVE SHEATH (H): ICD-10-CM

## 2021-07-28 DIAGNOSIS — H53.40 VISUAL FIELD DEFECT: ICD-10-CM

## 2021-07-28 DIAGNOSIS — H53.40 VISUAL FIELD DEFECT: Primary | ICD-10-CM

## 2021-07-28 PROCEDURE — 92133 CPTRZD OPH DX IMG PST SGM ON: CPT | Performed by: OPHTHALMOLOGY

## 2021-07-28 PROCEDURE — 92083 EXTENDED VISUAL FIELD XM: CPT | Performed by: OPHTHALMOLOGY

## 2021-07-28 PROCEDURE — A9585 GADOBUTROL INJECTION: HCPCS | Performed by: RADIOLOGY

## 2021-07-28 PROCEDURE — 70553 MRI BRAIN STEM W/O & W/DYE: CPT | Performed by: RADIOLOGY

## 2021-07-28 PROCEDURE — 70543 MRI ORBT/FAC/NCK W/O &W/DYE: CPT | Performed by: RADIOLOGY

## 2021-07-28 PROCEDURE — 92014 COMPRE OPH EXAM EST PT 1/>: CPT | Performed by: OPHTHALMOLOGY

## 2021-07-28 RX ORDER — GADOBUTROL 604.72 MG/ML
10 INJECTION INTRAVENOUS ONCE
Status: COMPLETED | OUTPATIENT
Start: 2021-07-28 | End: 2021-07-28

## 2021-07-28 RX ADMIN — GADOBUTROL 10 ML: 604.72 INJECTION INTRAVENOUS at 11:38

## 2021-07-28 ASSESSMENT — CONF VISUAL FIELD
METHOD: COUNTING FINGERS
OD_SUPERIOR_TEMPORAL_RESTRICTION: 1
OD_INFERIOR_TEMPORAL_RESTRICTION: 1
OS_NORMAL: 1
OD_SUPERIOR_NASAL_RESTRICTION: 1
OD_INFERIOR_NASAL_RESTRICTION: 1

## 2021-07-28 ASSESSMENT — REFRACTION_WEARINGRX
OS_SPHERE: +0.50
OS_CYLINDER: SPHERE
OD_ADD: +2.00
OS_ADD: +2.00
OD_CYLINDER: +0.75
OD_AXIS: 172
SPECS_TYPE: PAL
OD_SPHERE: +0.50

## 2021-07-28 ASSESSMENT — VISUAL ACUITY
OD_CC: LP WITH PROJECTION
OS_CC+: -2
OS_CC: 20/20
METHOD: SNELLEN - LINEAR

## 2021-07-28 ASSESSMENT — SLIT LAMP EXAM - LIDS
COMMENTS: NORMAL
COMMENTS: NORMAL

## 2021-07-28 ASSESSMENT — CUP TO DISC RATIO
OD_RATIO: 0.3
OS_RATIO: 0.2

## 2021-07-28 ASSESSMENT — EXTERNAL EXAM - RIGHT EYE: OD_EXAM: NORMAL

## 2021-07-28 ASSESSMENT — TONOMETRY
OD_IOP_MMHG: 15
OS_IOP_MMHG: 14
IOP_METHOD: ICARE

## 2021-07-28 ASSESSMENT — EXTERNAL EXAM - LEFT EYE: OS_EXAM: NORMAL

## 2021-07-28 NOTE — PROGRESS NOTES
"       Assessment & Plan     Shakila Kapoor is a 55 year old female with the following diagnoses:   1. Primary meningioma of optic nerve sheath (H)    2. Visual field defect         Patient was last seen on 1/13/21 for evaluation of possible optic neuritis in setting of multiple sclerosis. MRI from 2019 showed what appeared to be enhancement of the optic nerve.  On exam she had chronic-appearing disc edema so obtained repeat MRI.  MRI showed meningoma.  Had radiation from 3/3-4/14. In the middle of radiation she noticed that the vision in the right eye was worsening to the point where now she thinks it looks like she's \"looking through wax paper.\" She reports that she would wake up in the morning with total black outs of the vision that would improve but they slowly worsened.  Since April vision has been stable. Left eye vision is WNL.Denies headaches, pain with eye movements, diplopia.     Visual acuity light perception with projection and 20/20 left eye.  Fundus exam with continued disc edema RIGHT eye and normal left eye.     It is my impression that patient has chronic disc edema due to meningoma.  The meningioma was located within the optic canal.  Unfortunately, she has lost significant vision this spring.  The meningioma likely compressed the optic nerve within the canal causing relatively rapid vision loss.  She had undergone radiation therapy during this time, but lost vision.  At this time, I do not think that her vision would improve substantially with an optic canal decompression.  I would anticipate that her vision should remain stable.  She is having a repeat MRI today and I will take a look at that to see whether there are substantial changes or not.           Attending Physician Attestation:  Complete documentation of historical and exam elements from today's encounter can be found in the full encounter summary report (not reduplicated in this progress note).  I personally obtained the chief " complaint(s) and history of present illness.  I confirmed and edited as necessary the review of systems, past medical/surgical history, family history, social history, and examination findings as documented by others; and I examined the patient myself.  I personally reviewed the relevant tests, images, and reports as documented above.  I formulated and edited as necessary the assessment and plan and discussed the findings and management plan with the patient and family. - Samuel Soto MD

## 2021-07-28 NOTE — NURSING NOTE
Chief Complaints and History of Present Illnesses   Patient presents with     Follow Up     Chief Complaint(s) and History of Present Illness(es)     Follow Up     Laterality: right eye    Onset: gradual    Onset: months ago    Frequency: constantly    Course: gradually worsening    Treatments tried: no treatments    Pain scale: 0/10              Comments     Follow up for optic atrophy, pt states she has not been able to see out of the right eye since finishing radiation 4/14/2021-for meningioma . It feels like looking through wax paper. Pt states it has gradually gotten worse. It never improves. No drops. No pain. Next MRI scheduled today.    ARIEL Olsen COT 9:17 AM July 28, 2021

## 2021-07-29 ENCOUNTER — TELEPHONE (OUTPATIENT)
Dept: OPHTHALMOLOGY | Facility: CLINIC | Age: 56
End: 2021-07-29
Payer: COMMERCIAL

## 2021-07-29 NOTE — TELEPHONE ENCOUNTER
[facility] Call Center    Phone Message    May a detailed message be left on voicemail: yes     Reason for Call: Other: After Visit Appointment Discussion      Patient seen 7/28/2021 with Dr. Soto. She stated she was to receive a phone call back regarding after visit information and test/scan results that she had, as she had to leave quickly after the appointment to make it to her MRI appointment.   She hasn t received a call yet and stated she is free all day to discuss and can leave a detailed message.    Action Taken: Message routed to:  [facility] OPHTHALMOLOGY [facility]ULT [facility] [[id number]]    Travel Screening: Not Applicable

## 2021-07-30 ENCOUNTER — VIRTUAL VISIT (OUTPATIENT)
Dept: RADIATION ONCOLOGY | Facility: CLINIC | Age: 56
End: 2021-07-30
Attending: RADIOLOGY
Payer: COMMERCIAL

## 2021-07-30 DIAGNOSIS — D32.0 PRIMARY MENINGIOMA OF OPTIC NERVE SHEATH (H): Primary | ICD-10-CM

## 2021-07-30 NOTE — LETTER
2021         RE: Shakila Kapoor  [address] [place] [address]        Dear Colleague,    Thank you for referring your patient, Shakila Kapoor, to the [facility] RADIATION ONCOLOGY. Please see a copy of my visit note below.       Department of Radiation Oncology  [facility]  [address] [place] [address]  [phone]       Radiation Oncology Follow-up Visit  2021      Shakila Kapoor  MRN: [id number]   : 1965     DISEASE TREATED:   Right optic nerve sheath meningioma    RADIATION THERAPY DELIVERED:   Right optic nerve: 5040 cGy in 28 fractions, from 3/8/2021 - 2021    SYSTEMIC THERAPY:  None    INTERVAL SINCE COMPLETION OF RADIATION THERAPY:   3 months    SUBJECTIVE:   Ms. Kapoor is a 55 year old female with a PMH significant for relapsing multiple sclerosis initially diagnosed at age 21. She was diagnosed with a right optic nerve sheath meningioma in 2021 with imaging demonstrating thickening and enhancement of the right optic nerve sheath beginning just proximal to the entrance to the optic canal and extending into the intraorbital segment near the orbital apex. She underwent radiotherapy as described above for improved local disease control.    Ms. Kapoor is contacted via video visit for a post open treatment disease surveillance visit.  On examination, she ***     LABS AND IMAGIN2021 brain MRI:  Mildly increased size of presumed right optic nerve sheath meningioma in comparison to 2021 study. Currently measuring 5 mm, previously 3 mm. T2 hyperintense enhancing lesion within the right anterior temporal white matter compatible with active demyelination.    IMPRESSION:   Ms. Kapoor is a 55 year old female with a right optic nerve sheath meningioma. She is currently 3 months status post completion of radiotherapy with ***     PLAN:   ***    Abdulaziz Pride MD/PhD    Department of  Radiation Oncology  [facility]    Telephone start time: 1151  Telephone end time: 1203  Telephone visit duration: 8 minutes      Again, thank you for allowing me to participate in the care of your patient.        Sincerely,        Abdulaziz Pride MD

## 2021-07-30 NOTE — Clinical Note
NP phone visit in 2 weeks for symptom assessment MD follow-up in late 9/2021 with repeat brain MRI prior

## 2021-08-02 ENCOUNTER — MYC MEDICAL ADVICE (OUTPATIENT)
Dept: OPHTHALMOLOGY | Facility: CLINIC | Age: 56
End: 2021-08-02

## 2021-08-02 DIAGNOSIS — D32.0 PRIMARY MENINGIOMA OF OPTIC NERVE SHEATH (H): Primary | ICD-10-CM

## 2021-08-02 DIAGNOSIS — G35 MULTIPLE SCLEROSIS (H): ICD-10-CM

## 2021-08-02 RX ORDER — PREDNISONE 50 MG/1
1250 TABLET ORAL DAILY
Qty: 75 TABLET | Refills: 0 | Status: SHIPPED | OUTPATIENT
Start: 2021-08-02 | End: 2021-08-05

## 2021-08-02 NOTE — TELEPHONE ENCOUNTER
Called Shakila - she reports no worsening vision of either eye from previous. Noted discussions between Sravani Soto and [facility].     Script for prednisone 1250 mg daily x3 days (25x50 mg tablets) sent to her pharmacy. If Dr. Pride and team would want to continue for an extended course, they would need to place another order.     Advised about side effects including elevated blood glucose, high blood pressure, insomnia, agitation/mood swings, stomach upset.     Called back to confirm. She did not have any additional questions.

## 2021-08-03 DIAGNOSIS — H46.9 OPTIC NEURITIS: Primary | ICD-10-CM

## 2021-08-03 RX ORDER — HEPARIN SODIUM,PORCINE 10 UNIT/ML
5 VIAL (ML) INTRAVENOUS
Status: CANCELLED | OUTPATIENT
Start: 2021-08-03

## 2021-08-03 RX ORDER — DIPHENHYDRAMINE HYDROCHLORIDE 50 MG/ML
50 INJECTION INTRAMUSCULAR; INTRAVENOUS
Status: CANCELLED
Start: 2021-08-03

## 2021-08-03 RX ORDER — ALBUTEROL SULFATE 0.83 MG/ML
2.5 SOLUTION RESPIRATORY (INHALATION)
Status: CANCELLED | OUTPATIENT
Start: 2021-08-03

## 2021-08-03 RX ORDER — MEPERIDINE HYDROCHLORIDE 25 MG/ML
25 INJECTION INTRAMUSCULAR; INTRAVENOUS; SUBCUTANEOUS EVERY 30 MIN PRN
Status: CANCELLED | OUTPATIENT
Start: 2021-08-03

## 2021-08-03 RX ORDER — NALOXONE HYDROCHLORIDE 0.4 MG/ML
0.2 INJECTION, SOLUTION INTRAMUSCULAR; INTRAVENOUS; SUBCUTANEOUS
Status: CANCELLED | OUTPATIENT
Start: 2021-08-03

## 2021-08-03 RX ORDER — EPINEPHRINE 1 MG/ML
0.3 INJECTION, SOLUTION, CONCENTRATE INTRAVENOUS EVERY 5 MIN PRN
Status: CANCELLED | OUTPATIENT
Start: 2021-08-03

## 2021-08-03 RX ORDER — METHYLPREDNISOLONE SODIUM SUCCINATE 125 MG/2ML
125 INJECTION, POWDER, LYOPHILIZED, FOR SOLUTION INTRAMUSCULAR; INTRAVENOUS
Status: CANCELLED
Start: 2021-08-03

## 2021-08-03 RX ORDER — ALBUTEROL SULFATE 90 UG/1
1-2 AEROSOL, METERED RESPIRATORY (INHALATION)
Status: CANCELLED
Start: 2021-08-03

## 2021-08-03 RX ORDER — HEPARIN SODIUM (PORCINE) LOCK FLUSH IV SOLN 100 UNIT/ML 100 UNIT/ML
5 SOLUTION INTRAVENOUS
Status: CANCELLED | OUTPATIENT
Start: 2021-08-03

## 2021-08-04 NOTE — PROGRESS NOTES
Able to get urgent turnaround and should hear something within 24-48 hours     Case ID [id number]  - [employer]     Felisha Henderson on 8/4/2021 at 5:11 PM     RL

## 2021-08-10 NOTE — PROGRESS NOTES
"   Department of Radiation Oncology  [facility]  [address] [place] [address]  [phone]       Radiation Oncology Follow-up Visit  2021      Shakila Kapoor  MRN: [id number]   : 1965     DISEASE TREATED:   Right optic nerve sheath meningioma    RADIATION THERAPY DELIVERED:   Right optic nerve: 5040 cGy in 28 fractions, from 3/8/2021 - 2021    SYSTEMIC THERAPY:  None    INTERVAL SINCE COMPLETION OF RADIATION THERAPY:   3 months    SUBJECTIVE:   Ms. Kapoor is a 55 year old female with a PMH significant for relapsing multiple sclerosis initially diagnosed at age 21. She was diagnosed with a right optic nerve sheath meningioma in 2021 with imaging demonstrating thickening and enhancement of the right optic nerve sheath beginning just proximal to the entrance to the optic canal and extending into the intraorbital segment near the orbital apex. She underwent radiotherapy as described above for improved local disease control.    Ms. Kapoor is contacted via video visit for a post open treatment disease surveillance visit. On examination, she describes ongoing decreased vision in the right eye that she reports as if \"looking through a sheet of wax paper\". She was seen a couple days ago by Dr. Soto in ophthalmology and noted to have continued chronic disc edema of the right eye.  She had a MRI performed that same day (2021) which demonstrated slightly increased enhancement of the right optic nerve sheath meningioma compared to her pre-treatment scans. She was also noted to have a new 1 cm T2 hyperintense lesion within the right anterior temporal lobe which was felt to be compatible with active demyelination.    LABS AND IMAGIN2021 brain MRI:  Mildly increased size of presumed right optic nerve sheath meningioma in comparison to 2021 study. Currently measuring 5 mm, previously 3 mm. T2 hyperintense enhancing lesion within the right anterior " temporal white matter compatible with active demyelination.    IMPRESSION:   Ms. Kapoor is a 55 year old female with a right optic nerve sheath meningioma. She is currently 3 months status post completion of radiotherapy with stable clinical features and radiographic evidence of mildly increased enhancement of the treated right optic nerve sheath meningioma.    PLAN:   I discussed with Ms. Kapoor that I will talk with Dr. Soto in ophthalmology and my preference would be to start a course of steroids to hopefully reduce some of the inflammation visualized on imaging and I will plan to rescan her in approximately 2 months. In the interim, I will have someone from my team follow-up with her after her steroid therapy to subjectively assess her symptoms.    Abdulaziz Pride MD/PhD    Department of Radiation Oncology  [facility]    Telephone start time: 1151  Telephone end time: 1203  Telephone visit duration: 8 minutes

## 2021-08-19 ENCOUNTER — OFFICE VISIT (OUTPATIENT)
Dept: RADIATION ONCOLOGY | Facility: CLINIC | Age: 56
End: 2021-08-19
Attending: NURSE PRACTITIONER
Payer: COMMERCIAL

## 2021-08-19 DIAGNOSIS — D32.0 PRIMARY MENINGIOMA OF OPTIC NERVE SHEATH (H): Primary | ICD-10-CM

## 2021-08-19 NOTE — LETTER
"    2021         RE: Shakila Kapoor  [address] [place] [address]        Dear Colleague,    Thank you for referring your patient, Shakila Kapoor, to the [facility] RADIATION ONCOLOGY. Please see a copy of my visit note below.       Department of Radiation Oncology  [facility]  [address] [place] [address]  [phone]       Radiation Oncology Follow-up Visit  2021      Shakila Kapoor  MRN: [id number]   : 1965     DISEASE TREATED:   Right optic nerve sheath meningioma    RADIATION THERAPY DELIVERED:   Right optic nerve: 5040 cGy in 28 fractions, from 3/8/2021 - 2021    SYSTEMIC THERAPY:  None    INTERVAL SINCE COMPLETION OF RADIATION THERAPY:   3 months    SUBJECTIVE:   Ms. Kapoor is a 55 year old female with a PMH significant for relapsing multiple sclerosis initially diagnosed at age 21. She was diagnosed with a right optic nerve sheath meningioma in 2021 with imaging demonstrating thickening and enhancement of the right optic nerve sheath beginning just proximal to the entrance to the optic canal and extending into the intraorbital segment near the orbital apex. She underwent radiotherapy as described above for improved local disease control.    Ms. Kapoor is contacted via video visit for a post open treatment disease surveillance visit. On examination, she describes ongoing decreased vision in the right eye that she reports as if \"looking through a sheet of wax paper\". She was seen a couple days ago by Dr. Soto in ophthalmology and noted to have continued chronic disc edema of the right eye.  She had a MRI performed that same day (2021) which demonstrated slightly increased enhancement of the right optic nerve sheath meningioma compared to her pre-treatment scans. She was also noted to have a new 1 cm T2 hyperintense lesion within the right anterior temporal lobe which was felt to be compatible with active " demyelination.  She was placed on 1250 mg a day of prednisone for 3 days.  She noticed no significant change in her vision.  She can only see shadows with her right eye.    LABS AND IMAGIN2021 brain MRI:  Mildly increased size of presumed right optic nerve sheath meningioma in comparison to 2021 study. Currently measuring 5 mm, previously 3 mm. T2 hyperintense enhancing lesion within the right anterior temporal white matter compatible with active demyelination.    IMPRESSION:   Ms. Kapoor is a 55 year old female with a right optic nerve sheath meningioma. She is currently 3 months status post completion of radiotherapy with stable clinical features and radiographic evidence of mildly increased enhancement of the treated right optic nerve sheath meningioma and significantly decreased vision of right her without improvement after steroids.    PLAN:   Discussed with Dr. Pride and Dr. Soto.  No further treatment recommended or available for decreased vision in right eye. Patient notified.  Continue to follow and she has follow up with both Sravani Soto and [facility] on  and MRI.    Gwendolyn Cody NP  Department of Radiation Oncology  [facility]

## 2021-08-19 NOTE — PROGRESS NOTES
"   Department of Radiation Oncology  [facility]  [address] [place] [address]  [phone]       Radiation Oncology Follow-up Visit  2021      Shakila Kapoor  MRN: [id number]   : 1965     DISEASE TREATED:   Right optic nerve sheath meningioma    RADIATION THERAPY DELIVERED:   Right optic nerve: 5040 cGy in 28 fractions, from 3/8/2021 - 2021    SYSTEMIC THERAPY:  None    INTERVAL SINCE COMPLETION OF RADIATION THERAPY:   3 months    SUBJECTIVE:   Ms. Kapoor is a 55 year old female with a PMH significant for relapsing multiple sclerosis initially diagnosed at age 21. She was diagnosed with a right optic nerve sheath meningioma in 2021 with imaging demonstrating thickening and enhancement of the right optic nerve sheath beginning just proximal to the entrance to the optic canal and extending into the intraorbital segment near the orbital apex. She underwent radiotherapy as described above for improved local disease control.    Ms. Kapoor is contacted via video visit for a post open treatment disease surveillance visit. On examination, she describes ongoing decreased vision in the right eye that she reports as if \"looking through a sheet of wax paper\". She was seen a couple days ago by Dr. Soto in ophthalmology and noted to have continued chronic disc edema of the right eye.  She had a MRI performed that same day (2021) which demonstrated slightly increased enhancement of the right optic nerve sheath meningioma compared to her pre-treatment scans. She was also noted to have a new 1 cm T2 hyperintense lesion within the right anterior temporal lobe which was felt to be compatible with active demyelination.  She was placed on 1250 mg a day of prednisone for 3 days.  She noticed no significant change in her vision.  She can only see shadows with her right eye.    LABS AND IMAGIN2021 brain MRI:  Mildly increased size of presumed right optic " nerve sheath meningioma in comparison to 2/25/2021 study. Currently measuring 5 mm, previously 3 mm. T2 hyperintense enhancing lesion within the right anterior temporal white matter compatible with active demyelination.    IMPRESSION:   Ms. Kapoor is a 55 year old female with a right optic nerve sheath meningioma. She is currently 3 months status post completion of radiotherapy with stable clinical features and radiographic evidence of mildly increased enhancement of the treated right optic nerve sheath meningioma and significantly decreased vision of right her without improvement after steroids.    PLAN:   Discussed with Dr. Pride and Dr. Soto.  No further treatment recommended or available for decreased vision in right eye. Patient notified.  Continue to follow and she has follow up with both Sravani Soto and [facility]g on 9/29 and MRI.    Gwendolyn Cody NP  Department of Radiation Oncology  [facility]

## 2021-09-01 ENCOUNTER — LAB (OUTPATIENT)
Dept: LAB | Facility: CLINIC | Age: 56
End: 2021-09-01
Payer: COMMERCIAL

## 2021-09-01 ENCOUNTER — OFFICE VISIT (OUTPATIENT)
Dept: NEUROLOGY | Facility: CLINIC | Age: 56
End: 2021-09-01
Attending: PSYCHIATRY & NEUROLOGY
Payer: COMMERCIAL

## 2021-09-01 ENCOUNTER — PRE VISIT (OUTPATIENT)
Dept: NEUROLOGY | Facility: CLINIC | Age: 56
End: 2021-09-01

## 2021-09-01 VITALS
BODY MASS INDEX: 33.72 KG/M2 | SYSTOLIC BLOOD PRESSURE: 132 MMHG | OXYGEN SATURATION: 100 % | HEIGHT: 65 IN | HEART RATE: 110 BPM | WEIGHT: 202.4 LBS | DIASTOLIC BLOOD PRESSURE: 83 MMHG

## 2021-09-01 DIAGNOSIS — G35 MS (MULTIPLE SCLEROSIS) (H): ICD-10-CM

## 2021-09-01 DIAGNOSIS — G35 MS (MULTIPLE SCLEROSIS) (H): Primary | ICD-10-CM

## 2021-09-01 LAB
HOLD SPECIMEN: NORMAL
HOLD SPECIMEN: NORMAL

## 2021-09-01 PROCEDURE — 36415 COLL VENOUS BLD VENIPUNCTURE: CPT | Performed by: PATHOLOGY

## 2021-09-01 PROCEDURE — 99204 OFFICE O/P NEW MOD 45 MIN: CPT | Mod: GC | Performed by: PSYCHIATRY & NEUROLOGY

## 2021-09-01 PROCEDURE — 85025 COMPLETE CBC W/AUTO DIFF WBC: CPT | Performed by: PATHOLOGY

## 2021-09-01 PROCEDURE — 80053 COMPREHEN METABOLIC PANEL: CPT | Performed by: PATHOLOGY

## 2021-09-01 PROCEDURE — G0463 HOSPITAL OUTPT CLINIC VISIT: HCPCS

## 2021-09-01 ASSESSMENT — MIFFLIN-ST. JEOR: SCORE: 1523.96

## 2021-09-01 ASSESSMENT — PAIN SCALES - GENERAL: PAINLEVEL: MILD PAIN (3)

## 2021-09-01 NOTE — PATIENT INSTRUCTIONS
-Sleep study  -Baseline blood work: CBC, CMP  -Counseled on Mediterranean diet  -Alpha lipoic acid and omega fatty acids daily  -Counseled on daily physical activity  -Continue Rebif until new medication arrives  -Start Zeposia (ozanimod)  -Discuss dalfampridine at next appointment  -Referral to neurosurgery for evaluation of C spine disc herniation and possible cord compression  -Follow up with Marsha Be NP in 4 weeks  -EKG prior to new med

## 2021-09-01 NOTE — LETTER
"9/1/2021       RE: Shakila Kapoor  [address] [place] [address]     Dear Colleague,    Thank you for referring your patient, Shakila Kapoor, to the [facility] MULTIPLE SCLEROSIS CLINIC [place] at [facility]. Please see a copy of my visit note below.    THE [facility] MULTIPLE SCLEROSIS CLINIC  NEW PATIENT EVALUATION/CONSULTATION    Referral source:   Self        PRINCIPAL NEUROLOGIC DIAGNOSIS: Multiple Sclerosis    DISEASE SUMMARY  Date of onset: 1987  Date of diagnosis of MS: Suspected initially, confirmed in 2019  Disease course at onset: Relapsing Remitting  Current disease course: Relapsing Remitting  Previous disease therapies: N/A  Current disease therapy: Rebif  Most recent MRI brain: 6/21/21  Most recent MRI cervical spine: 6/21/21 w suspected new enhancement  JCV serology result and date: N/A      HISTORY OF ILLNESS:  An opinion on this 55 year old female  was requested by the patient for transfer of care for MS. The patient was accompanied by her . Initial symptoms were L sided sensation changes at age 20, as well as an episode of optic neuritis. The symptoms of both these resolved and She was diagnosed with possible/probable MS but was not started on treatment.    She had some \"vision changes\" in 2000 after having her second child and got an MRI in [place], but didn't really follow up on the results after the issues resolved. Doesn't remember what the specific changes were.    She was formally dx with RRMS in 2019 after R eye vision changes. 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 She got IV steroids at time of discovery without improvement. She follows with Dr. Soto in ophthalmology and Dr. Pride in oncology.    On her most recent MRI, new lesions were seen and she decided to transfer care here from [facility]. Wanted to wait to change " meds until this appointment so she kept taking Rebif. No obvious new clinical symptoms.    Ongoing symptoms include Overactive bladder for which she takes oxybutinin;   Muscle cramps in her feet which only happen at night and are not very bothersome; and pain in her lower back when standing for too long. No neck pain, no falls. But very unsteady when walking.    She has a lot of fatigue. Has good sleep and gets plenty of it but still is tired in the morning. No morning headaches. Does snore per . Heat makes it worse. She has previously tried provigil for fatigue but it impaired her sleep    Memory is a little more challenging than it used to be. It hasn't impacted her work but she worries that it could. She mostly notes word finding issues. Denies getting lost or inability to do IADLs        Other info:  -Mood has been somewhat worse, mostly related to vision loss. She follows with mental health.   -2 years post-menopausal, no hot flashes   -She is COVID vaccinated and planning to schedule booster soon  -Sometimes she gets a sharp pain in the back of her head. It lasts seconds and happens very infrequently. No obvious triggers.    Current Symptoms:  1. Fatigue  2. Urinary urgency/frequency  3. Gait abnormality        PAST HISTORY:  Past Medical History:   Diagnosis Date     Meningioma (H)      Multiple sclerosis (H)        Past Surgical History:   Procedure Laterality Date     NO HISTORY OF SURGERY                Current Outpatient Prescriptions:  Current Outpatient Medications   Medication     oxybutynin ER (DITROPAN-XL) 10 MG 24 hr tablet     PARoxetine (PAXIL) 30 MG tablet     REBIF REBIDOSE TITRATION PACK 6X8.8 & 6X22 MCG SOAJ     White Petrolatum-Mineral Oil (REFRESH P.M.) OINT     No current facility-administered medications for this visit.          ALLERGIES       Allergies   Allergen Reactions     Penicillins      Other reaction(s): *Unknown         Social History    Social History     Socioeconomic  History     Marital status:    Occupational History     Not on file   Tobacco Use     Smoking status: Never Smoker     Smokeless tobacco: Never Used   Substance and Sexual Activity     Alcohol use: Yes     Comment: rarely     Drug use: Never     Sexual activity: Not on file         FAMILY HISTORY     Family History   Problem Relation Age of Onset     Lung Cancer Maternal Grandmother      Lung Cancer Maternal Grandfather      Lung Cancer Paternal Grandmother      Glaucoma No family hx of      Macular Degeneration No family hx of          REVIEW OF SYSTEMS:    Comprehensive review of systems otherwise was negative, including constitutional, head and neck, cardiovascular, pulmonary, gastrointestinal, endocrine, urologic, reproductive, rheumatic, hematologic, immunologic, dermatologic, and psychiatric.    Nutritional concerns: Obesity  Driving issues: Unilateral vision loss, still able to drive with precautions  Safety concerns regarding living situations and safety at home: None  Risk of falls: Moderate  Pain: low back    PHYSICAL EXAM:    Hair, skin, nails, and joints were normal.    The patient was alert and oriented. Speech and language comprehension was normal. Affect was normal. The patient did not appear depressed.    Visual acuity:  R eye intact to light only    Visual fields were full to confrontation.   Pupils R RAPD.   Extraocular movements: Intact without MELANIA  Facial movement was full and symmetric  Muscles of facial expression were normal  Hearing was normal. Palatal movements were normal. Shoulder shrug intact. Tongue movements were normal. There was no dysarthria.          Motor    Upper      Right Left   Shoulder Abduction 5 5-   Elbow Flexion 5 5   Elbow Extension 5 5   Wrist Extension 5 5   Digit Extension 5 5   Digit Flexion 5- 5   Digit abduction 5- 5-   Tone normal normal   Lower       Right Left   Hip Flexion 5- 5-   Knee Extension 5 5   Knee Flexion 5 5   Foot Dorsiflexion 5 5   Foot  "Plantar Flexion 5 5   Tone increased increased           Reflexes:     Reflexes       Right  Left   Biceps 2+  2+   Brachioradialis 3+  3+   Patellar  3+ > 3+   Achilles 2+ > 2+   Babinski down  down   Few beats clonus bilaterally, restricted somewhat by spasticity      Coordination:     Right Left   Rapid finger tapping Normal Normal   Finger to nose Mild dysmetria Mild dysmetria   Rapid foot tapping Slightly impaired Slightly impaired   Heel shin Normal Normal         Sensory examination:  Light touch:  Intact in all extremities, Pin Prick:  Intact in all extremities and Prioperception:  Intact in all extremities. Subjective numbness on bottoms o feet. Romberg neg        Gait: Normal station and stride length. With slightly broad and unsteady gait. Able to walk in tandem with great difficulty.            REVIEW OF LABS and IMAGING STUDIES:    I personally reviewed the following images:  MRI brain 7/28/21: \"Findings:  Presumed right optic nerve sheath meningioma extending from the  orbital apex into the optic canal, which is increased compared to  2/25/2021. For example, intraorbital thickness measures 5 mm (series  15 image 16), previously 3 mm on coronal postcontrast T1-weighted  image.     Regarding the remainder of the brain, extensive T2 hyperintense  demyelinating lesions within the bilateral cerebral white matter. 10 x  8 mm T2 hyperintense lesion within the right anterior temporal white  matter is new compared to 2/25/2021 with associated enhancement.  Associated T1 hypointense signal, may represent black holes. Unchanged  T2 hyperintensity within the right cerebellar hemisphere, likely  represents isolated demyelinating lesion versus chronic infarct. The  ventricles are proportionate to the cerebral sulci. There is no mass  effect or midline shift intracranially. The major intracranial  vascular flow-voids are patent. Partial empty sella.                                                                    " "  Impression:  In this patient with history of multiple sclerosis,  1. Slightly increased presumed right optic nerve sheath meningioma,  compared to 2/25/2021.  2. T2 hyperintense enhancing lesion within the right anterior temporal  white matter is new compared to 2/25/2021, compatible with active  Demyelination.\"    Compaired with Brain, C&T spine MRIs from 6/21/21 and 9/16/19 she has at least 2 new brain lesions (1 enhancing) and one enhancing spinal cord lesion.      ASSESSMENT:  Ms. Kapoor is a 54yo woman w MS formally diagnosed in 2019 but with symptoms going back to ~1987. The most important issue today is changing her disease modifying therapy as she has failed Rebif. She is in agreement with new medication after discussion of risks and benefits.    She has several other issue we will start to address today, as well. Primarily   We would like to start addressing fatigue. Since she snores and wakes up tired, we fell it would be worth getting a sleep study to check for sleep apnea. We also discussed nutritional and exercise recommendations which can help with fatigue. Next visit, we will consider dalfamprodine if her gait and fatigue issues continue, but will defer for now since we are starting other new medications.      We would also like tot have neurosurgery weigh in on new C spine lesion. It is at the level of 2 herniated discs and although slight CSF space can be seen posterior to the cord, we would like neurosurgery to weight in on whether there is concern for cord compression (as opposed to a new MS lesion).      PLAN:  -Sleep study  -Baseline blood work: CBC, CMP  -Counseled on Mediterranean diet  -Alpha lipoic acid and omega fatty acids daily  -Counseled on daily physical activity  -Continue Rebif until new medication arrives  -Start Zeposia (ozanimod)  -Discuss dalfampridine at next appointment  -Referral to neurosurgery for evaluation of C spine disc herniation and possible cord compression  -Follow " up with Marsha Be NP in 4 weeks  -EKG prior to starting ozanimo  -Continue oxybutinin       Finally I will follow the patient up in 4 month(s) as long as the patient is doing well. I instructed the patient to call or mychart my office with any concerns or questions.    This patient was evaluated by Neurology PGY-4 Starla Brooks MD      I saw and evaluated the patient with the resident and agree with the assessment and plan. I was present for key portions of the above major examination.    Savannah Sharma MD  Chief, Multiple Sclerosis Division  Department of Neurology  [facility]        Again, thank you for allowing me to participate in the care of your patient.      Sincerely,    Savannah Sharma MD

## 2021-09-01 NOTE — PROGRESS NOTES
"THE [facility] MULTIPLE SCLEROSIS CLINIC  NEW PATIENT EVALUATION/CONSULTATION    Referral source:   Self        PRINCIPAL NEUROLOGIC DIAGNOSIS: Multiple Sclerosis    DISEASE SUMMARY  Date of onset: 1987  Date of diagnosis of MS: Suspected initially, confirmed in 2019  Disease course at onset: Relapsing Remitting  Current disease course: Relapsing Remitting  Previous disease therapies: N/A  Current disease therapy: Rebif  Most recent MRI brain: 6/21/21  Most recent MRI cervical spine: 6/21/21 w suspected new enhancement  JCV serology result and date: N/A      HISTORY OF ILLNESS:  An opinion on this 55 year old female  was requested by the patient for transfer of care for MS. The patient was accompanied by her . Initial symptoms were L sided sensation changes at age 20, as well as an episode of optic neuritis. The symptoms of both these resolved and She was diagnosed with possible/probable MS but was not started on treatment.    She had some \"vision changes\" in 2000 after having her second child and got an MRI in [place], but didn't really follow up on the results after the issues resolved. Doesn't remember what the specific changes were.    She was formally dx with RRMS in 2019 after R eye vision changes. 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 She got IV steroids at time of discovery without improvement. She follows with Dr. Soto in ophthalmology and Dr. Pride in oncology.    On her most recent MRI, new lesions were seen and she decided to transfer care here from [facility]. Wanted to wait to change meds until this appointment so she kept taking Rebif. No obvious new clinical symptoms.    Ongoing symptoms include Overactive bladder for which she takes oxybutinin;   Muscle cramps in her feet which only happen at night and are not very bothersome; and pain in her lower back when standing for too long. No neck pain, no " falls. But very unsteady when walking.    She has a lot of fatigue. Has good sleep and gets plenty of it but still is tired in the morning. No morning headaches. Does snore per . Heat makes it worse. She has previously tried provigil for fatigue but it impaired her sleep    Memory is a little more challenging than it used to be. It hasn't impacted her work but she worries that it could. She mostly notes word finding issues. Denies getting lost or inability to do IADLs        Other info:  -Mood has been somewhat worse, mostly related to vision loss. She follows with mental health.   -2 years post-menopausal, no hot flashes   -She is COVID vaccinated and planning to schedule booster soon  -Sometimes she gets a sharp pain in the back of her head. It lasts seconds and happens very infrequently. No obvious triggers.    Current Symptoms:  1. Fatigue  2. Urinary urgency/frequency  3. Gait abnormality        PAST HISTORY:  Past Medical History:   Diagnosis Date     Meningioma (H)      Multiple sclerosis (H)        Past Surgical History:   Procedure Laterality Date     NO HISTORY OF SURGERY                Current Outpatient Prescriptions:  Current Outpatient Medications   Medication     oxybutynin ER (DITROPAN-XL) 10 MG 24 hr tablet     PARoxetine (PAXIL) 30 MG tablet     REBIF REBIDOSE TITRATION PACK 6X8.8 & 6X22 MCG SOAJ     White Petrolatum-Mineral Oil (REFRESH P.M.) OINT     No current facility-administered medications for this visit.          ALLERGIES       Allergies   Allergen Reactions     Penicillins      Other reaction(s): *Unknown         Social History    Social History     Socioeconomic History     Marital status:    Occupational History     Not on file   Tobacco Use     Smoking status: Never Smoker     Smokeless tobacco: Never Used   Substance and Sexual Activity     Alcohol use: Yes     Comment: rarely     Drug use: Never     Sexual activity: Not on file         FAMILY HISTORY     Family History    Problem Relation Age of Onset     Lung Cancer Maternal Grandmother      Lung Cancer Maternal Grandfather      Lung Cancer Paternal Grandmother      Glaucoma No family hx of      Macular Degeneration No family hx of          REVIEW OF SYSTEMS:    Comprehensive review of systems otherwise was negative, including constitutional, head and neck, cardiovascular, pulmonary, gastrointestinal, endocrine, urologic, reproductive, rheumatic, hematologic, immunologic, dermatologic, and psychiatric.    Nutritional concerns: Obesity  Driving issues: Unilateral vision loss, still able to drive with precautions  Safety concerns regarding living situations and safety at home: None  Risk of falls: Moderate  Pain: low back    PHYSICAL EXAM:    Hair, skin, nails, and joints were normal.    The patient was alert and oriented. Speech and language comprehension was normal. Affect was normal. The patient did not appear depressed.    Visual acuity:  R eye intact to light only    Visual fields were full to confrontation.   Pupils R RAPD.   Extraocular movements: Intact without MELANIA  Facial movement was full and symmetric  Muscles of facial expression were normal  Hearing was normal. Palatal movements were normal. Shoulder shrug intact. Tongue movements were normal. There was no dysarthria.          Motor    Upper      Right Left   Shoulder Abduction 5 5-   Elbow Flexion 5 5   Elbow Extension 5 5   Wrist Extension 5 5   Digit Extension 5 5   Digit Flexion 5- 5   Digit abduction 5- 5-   Tone normal normal   Lower       Right Left   Hip Flexion 5- 5-   Knee Extension 5 5   Knee Flexion 5 5   Foot Dorsiflexion 5 5   Foot Plantar Flexion 5 5   Tone increased increased           Reflexes:     Reflexes       Right  Left   Biceps 2+  2+   Brachioradialis 3+  3+   Patellar  3+ > 3+   Achilles 2+ > 2+   Babinski down  down   Few beats clonus bilaterally, restricted somewhat by spasticity      Coordination:     Right Left   Rapid finger tapping Normal  "Normal   Finger to nose Mild dysmetria Mild dysmetria   Rapid foot tapping Slightly impaired Slightly impaired   Heel shin Normal Normal         Sensory examination:  Light touch:  Intact in all extremities, Pin Prick:  Intact in all extremities and Prioperception:  Intact in all extremities. Subjective numbness on bottoms o feet. Romberg neg        Gait: Normal station and stride length. With slightly broad and unsteady gait. Able to walk in tandem with great difficulty.            REVIEW OF LABS and IMAGING STUDIES:    I personally reviewed the following images:  MRI brain 7/28/21: \"Findings:  Presumed right optic nerve sheath meningioma extending from the  orbital apex into the optic canal, which is increased compared to  2/25/2021. For example, intraorbital thickness measures 5 mm (series  15 image 16), previously 3 mm on coronal postcontrast T1-weighted  image.     Regarding the remainder of the brain, extensive T2 hyperintense  demyelinating lesions within the bilateral cerebral white matter. 10 x  8 mm T2 hyperintense lesion within the right anterior temporal white  matter is new compared to 2/25/2021 with associated enhancement.  Associated T1 hypointense signal, may represent black holes. Unchanged  T2 hyperintensity within the right cerebellar hemisphere, likely  represents isolated demyelinating lesion versus chronic infarct. The  ventricles are proportionate to the cerebral sulci. There is no mass  effect or midline shift intracranially. The major intracranial  vascular flow-voids are patent. Partial empty sella.                                                                      Impression:  In this patient with history of multiple sclerosis,  1. Slightly increased presumed right optic nerve sheath meningioma,  compared to 2/25/2021.  2. T2 hyperintense enhancing lesion within the right anterior temporal  white matter is new compared to 2/25/2021, compatible with active  Demyelination.\"    Compaired " with Brain, C&T spine MRIs from 6/21/21 and 9/16/19 she has at least 2 new brain lesions (1 enhancing) and one enhancing spinal cord lesion.      ASSESSMENT:  Ms. Kapoor is a 56yo woman w MS formally diagnosed in 2019 but with symptoms going back to ~1987. The most important issue today is changing her disease modifying therapy as she has failed Rebif. She is in agreement with new medication after discussion of risks and benefits.    She has several other issue we will start to address today, as well. Primarily   We would like to start addressing fatigue. Since she snores and wakes up tired, we fell it would be worth getting a sleep study to check for sleep apnea. We also discussed nutritional and exercise recommendations which can help with fatigue. Next visit, we will consider dalfamprodine if her gait and fatigue issues continue, but will defer for now since we are starting other new medications.      We would also like tot have neurosurgery weigh in on new C spine lesion. It is at the level of 2 herniated discs and although slight CSF space can be seen posterior to the cord, we would like neurosurgery to weight in on whether there is concern for cord compression (as opposed to a new MS lesion).      PLAN:  -Sleep study  -Baseline blood work: CBC, CMP  -Counseled on Mediterranean diet  -Alpha lipoic acid and omega fatty acids daily  -Counseled on daily physical activity  -Continue Rebif until new medication arrives  -Start Zeposia (ozanimod)  -Discuss dalfampridine at next appointment  -Referral to neurosurgery for evaluation of C spine disc herniation and possible cord compression  -Follow up with Marsha Be NP in 4 weeks  -EKG prior to starting ozanimo  -Continue oxybutinin       Finally I will follow the patient up in 4 month(s) as long as the patient is doing well. I instructed the patient to call or mychart my office with any concerns or questions.    This patient was evaluated by Neurology PGY-4 Starla  MD Cecilia      I saw and evaluated the patient with the resident and agree with the assessment and plan. I was present for key portions of the above major examination.    Savannah Sharma MD  Chief, Multiple Sclerosis Division  Department of Neurology  [facility]

## 2021-09-02 ENCOUNTER — TELEPHONE (OUTPATIENT)
Dept: NEUROLOGY | Facility: CLINIC | Age: 56
End: 2021-09-02

## 2021-09-02 LAB
ALBUMIN SERPL-MCNC: 3.9 G/DL (ref 3.4–5)
ALP SERPL-CCNC: 98 U/L (ref 40–150)
ALT SERPL W P-5'-P-CCNC: 24 U/L (ref 0–50)
ANION GAP SERPL CALCULATED.3IONS-SCNC: 4 MMOL/L (ref 3–14)
AST SERPL W P-5'-P-CCNC: 14 U/L (ref 0–45)
BASOPHILS # BLD AUTO: 0.1 10E3/UL (ref 0–0.2)
BASOPHILS NFR BLD AUTO: 1 %
BILIRUB SERPL-MCNC: 0.4 MG/DL (ref 0.2–1.3)
BUN SERPL-MCNC: 12 MG/DL (ref 7–30)
CALCIUM SERPL-MCNC: 9 MG/DL (ref 8.5–10.1)
CHLORIDE BLD-SCNC: 108 MMOL/L (ref 94–109)
CO2 SERPL-SCNC: 30 MMOL/L (ref 20–32)
CREAT SERPL-MCNC: 0.68 MG/DL (ref 0.52–1.04)
EOSINOPHIL # BLD AUTO: 0.1 10E3/UL (ref 0–0.7)
EOSINOPHIL NFR BLD AUTO: 2 %
ERYTHROCYTE [DISTWIDTH] IN BLOOD BY AUTOMATED COUNT: 13.5 % (ref 10–15)
GFR SERPL CREATININE-BSD FRML MDRD: >90 ML/MIN/1.73M2
GLUCOSE BLD-MCNC: 95 MG/DL (ref 70–99)
HCT VFR BLD AUTO: 43.6 % (ref 35–47)
HGB BLD-MCNC: 14.7 G/DL (ref 11.7–15.7)
IMM GRANULOCYTES # BLD: 0 10E3/UL
IMM GRANULOCYTES NFR BLD: 0 %
LYMPHOCYTES # BLD AUTO: 1.6 10E3/UL (ref 0.8–5.3)
LYMPHOCYTES NFR BLD AUTO: 23 %
MCH RBC QN AUTO: 30.3 PG (ref 26.5–33)
MCHC RBC AUTO-ENTMCNC: 33.7 G/DL (ref 31.5–36.5)
MCV RBC AUTO: 90 FL (ref 78–100)
MONOCYTES # BLD AUTO: 0.6 10E3/UL (ref 0–1.3)
MONOCYTES NFR BLD AUTO: 9 %
NEUTROPHILS # BLD AUTO: 4.5 10E3/UL (ref 1.6–8.3)
NEUTROPHILS NFR BLD AUTO: 65 %
NRBC # BLD AUTO: 0 10E3/UL
NRBC BLD AUTO-RTO: 0 /100
PLATELET # BLD AUTO: 310 10E3/UL (ref 150–450)
POTASSIUM BLD-SCNC: 4.3 MMOL/L (ref 3.4–5.3)
PROT SERPL-MCNC: 7.3 G/DL (ref 6.8–8.8)
RBC # BLD AUTO: 4.85 10E6/UL (ref 3.8–5.2)
SODIUM SERPL-SCNC: 142 MMOL/L (ref 133–144)
WBC # BLD AUTO: 6.8 10E3/UL (ref 4–11)

## 2021-09-02 NOTE — TELEPHONE ENCOUNTER
PA Initiation    Medication: Zeposia Starter Kit 0.23MG & 0.46MG& 0.92MG capsules (PENDING)  Insurance Company: [employer] - Phone [phone] Fax [phone]  Pharmacy Filling the Rx: [facility] - [place], [place] - [address]  Filling Pharmacy Phone:    Filling Pharmacy Fax:    Start Date: 9/2/2021          Thank you,    Jasmyn Garcia [facility]  Specialty Pharmacy Clinic [address]  [address] [place] [address]  Ph: [phone] Fax: [phone]  [email or web address]

## 2021-09-02 NOTE — TELEPHONE ENCOUNTER
Prior Authorization Specialty Medication Request    Medication/Dose: Zeposia   ICD code (if different than what is on RX):  Multiple Sclerosis, G35  Previously Tried and Failed:  Rebif    Important Lab Values:   Rationale: Continuation of disease modifying therapy for demyelinating disease, please approve    Insurance Name: [employer]  Insurance ID: [id number]  Insurance Phone Number: [phone]    Pharmacy Information (if different than what is on RX)  Name:    Phone:

## 2021-09-07 DIAGNOSIS — D32.0 PRIMARY MENINGIOMA OF OPTIC NERVE SHEATH (H): Primary | ICD-10-CM

## 2021-09-08 NOTE — TELEPHONE ENCOUNTER
Prior Authorization Approval    Authorization Effective Date: 9/2/2021  Authorization Expiration Date: 9/2/2022  Medication: Zeposia Starter Kit 0.23MG & 0.46MG& 0.92MG capsules (APPROVED)  Approved Dose/Quantity: 30 days  Reference #:     Insurance Company: [employer] - Phone [phone] Fax [phone]  Expected CoPay: $0     CoPay Card Available: Yes    Foundation Assistance Needed:    Which Pharmacy is filling the prescription (Not needed for infusion/clinic administered): [facility] - [place], [place] - [address]   Pharmacy Notified: Yes  Patient Notified: Yes    Start form and approval letter faxed to [employer] for review. Expected copay at this time is $0. She is restricted to [employer].

## 2021-09-11 ENCOUNTER — HEALTH MAINTENANCE LETTER (OUTPATIENT)
Age: 56
End: 2021-09-11

## 2021-09-20 ENCOUNTER — TRANSFERRED RECORDS (OUTPATIENT)
Dept: HEALTH INFORMATION MANAGEMENT | Facility: CLINIC | Age: 56
End: 2021-09-20

## 2021-09-21 ENCOUNTER — TELEPHONE (OUTPATIENT)
Dept: NEUROLOGY | Facility: CLINIC | Age: 56
End: 2021-09-21

## 2021-09-23 NOTE — TELEPHONE ENCOUNTER
EKG reviewed by Dr. Sharma.  Ok to proceed with Zeposia.  Baseline clearance form faxed to [employer].    Bee Kwon RN

## 2021-09-24 ENCOUNTER — TELEPHONE (OUTPATIENT)
Dept: NEUROLOGY | Facility: CLINIC | Age: 56
End: 2021-09-24

## 2021-09-24 NOTE — TELEPHONE ENCOUNTER
PA Initiation    Medication: ZEPOSIA  Insurance Company:    Pharmacy Filling the Rx: [facility] - [place], [place] - [address]  Filling Pharmacy Phone: [phone]  Filling Pharmacy Fax: [phone]  Start Date:

## 2021-09-29 ENCOUNTER — OFFICE VISIT (OUTPATIENT)
Dept: OPHTHALMOLOGY | Facility: CLINIC | Age: 56
End: 2021-09-29
Payer: COMMERCIAL

## 2021-09-29 ENCOUNTER — HOSPITAL ENCOUNTER (OUTPATIENT)
Dept: MRI IMAGING | Facility: CLINIC | Age: 56
Discharge: HOME | End: 2021-09-29
Attending: RADIOLOGY | Admitting: RADIOLOGY
Payer: COMMERCIAL

## 2021-09-29 ENCOUNTER — OFFICE VISIT (OUTPATIENT)
Dept: RADIATION ONCOLOGY | Facility: CLINIC | Age: 56
End: 2021-09-29
Attending: RADIOLOGY
Payer: COMMERCIAL

## 2021-09-29 VITALS
WEIGHT: 202 LBS | SYSTOLIC BLOOD PRESSURE: 143 MMHG | DIASTOLIC BLOOD PRESSURE: 78 MMHG | HEART RATE: 86 BPM | BODY MASS INDEX: 33.61 KG/M2

## 2021-09-29 DIAGNOSIS — D32.0 PRIMARY MENINGIOMA OF OPTIC NERVE SHEATH (H): ICD-10-CM

## 2021-09-29 DIAGNOSIS — H53.40 VISUAL FIELD DEFECT: Primary | ICD-10-CM

## 2021-09-29 DIAGNOSIS — H47.20 PARTIAL OPTIC ATROPHY: Primary | ICD-10-CM

## 2021-09-29 DIAGNOSIS — D32.0 PRIMARY MENINGIOMA OF OPTIC NERVE SHEATH (H): Primary | ICD-10-CM

## 2021-09-29 DIAGNOSIS — H53.40 VISUAL FIELD DEFECT: ICD-10-CM

## 2021-09-29 PROCEDURE — A9585 GADOBUTROL INJECTION: HCPCS | Performed by: RADIOLOGY

## 2021-09-29 PROCEDURE — G0463 HOSPITAL OUTPT CLINIC VISIT: HCPCS | Performed by: RADIOLOGY

## 2021-09-29 PROCEDURE — 92133 CPTRZD OPH DX IMG PST SGM ON: CPT | Performed by: OPHTHALMOLOGY

## 2021-09-29 PROCEDURE — 92014 COMPRE OPH EXAM EST PT 1/>: CPT | Performed by: OPHTHALMOLOGY

## 2021-09-29 PROCEDURE — 70553 MRI BRAIN STEM W/O & W/DYE: CPT | Mod: 26 | Performed by: RADIOLOGY

## 2021-09-29 PROCEDURE — 92083 EXTENDED VISUAL FIELD XM: CPT | Performed by: OPHTHALMOLOGY

## 2021-09-29 PROCEDURE — 70553 MRI BRAIN STEM W/O & W/DYE: CPT

## 2021-09-29 PROCEDURE — 70543 MRI ORBT/FAC/NCK W/O &W/DYE: CPT | Mod: 26 | Performed by: RADIOLOGY

## 2021-09-29 PROCEDURE — 255N000002 HC RX 255 OP 636: Performed by: RADIOLOGY

## 2021-09-29 RX ORDER — GADOBUTROL 604.72 MG/ML
10 INJECTION INTRAVENOUS ONCE
Status: COMPLETED | OUTPATIENT
Start: 2021-09-29 | End: 2021-09-29

## 2021-09-29 RX ORDER — OZANIMOD HYDROCHLORIDE 0.23-0.46
KIT ORAL
Status: ON HOLD | COMMUNITY
Start: 2021-09-23 | End: 2022-12-21

## 2021-09-29 RX ADMIN — GADOBUTROL 10 ML: 604.72 INJECTION INTRAVENOUS at 14:34

## 2021-09-29 ASSESSMENT — EXTERNAL EXAM - RIGHT EYE: OD_EXAM: NORMAL

## 2021-09-29 ASSESSMENT — CONF VISUAL FIELD
OD_SUPERIOR_TEMPORAL_RESTRICTION: 1
OD_SUPERIOR_NASAL_RESTRICTION: 1
OD_INFERIOR_TEMPORAL_RESTRICTION: 1
OD_INFERIOR_NASAL_RESTRICTION: 1

## 2021-09-29 ASSESSMENT — VISUAL ACUITY
OS_SC: 20/20
METHOD: SNELLEN - LINEAR
OD_SC: LP

## 2021-09-29 ASSESSMENT — CUP TO DISC RATIO
OD_RATIO: 0.2
OS_RATIO: 0.2

## 2021-09-29 ASSESSMENT — SLIT LAMP EXAM - LIDS
COMMENTS: NORMAL
COMMENTS: NORMAL

## 2021-09-29 ASSESSMENT — EXTERNAL EXAM - LEFT EYE: OS_EXAM: NORMAL

## 2021-09-29 ASSESSMENT — TONOMETRY
OS_IOP_MMHG: 12
OD_IOP_MMHG: 11
IOP_METHOD: ICARE

## 2021-09-29 NOTE — PROGRESS NOTES
FOLLOW-UP VISIT    Patient Name: Shakila Kapoor      : 1965     Age: 56 year old        ______________________________________________________________________________     Chief Complaint   Patient presents with     Cancer     Follow up:Menigioma:Right optic nerve 5040 cGy completed 21     Pain  Denies    Labs  Other Labs: No    Imaging  MRI: today      Other Appointments:     MD Name:  Appointment Date:    MD Name: Appointment Date:   MD Name: Appointment Date:   Other Appointment Notes:     Residual Radiation side effect: No concerns     Additional Instructions:     Nurse face-to-face time: Level 3:  10 min face to face time

## 2021-09-29 NOTE — LETTER
2021         RE: Shakila Kapoor  [address] [place] [address]         Department of Radiation Oncology  [facility]  [address] [place] [address]  [phone]       Radiation Oncology Follow-up Visit  2021      Shakila Kapoor  MRN: [id number]   : 1965     DISEASE TREATED:   Right optic nerve sheath meningioma    RADIATION THERAPY DELIVERED:   Right optic nerve: 5040 cGy in 28 fractions, from 3/8/2021 - 2021    SYSTEMIC THERAPY:  None    INTERVAL SINCE COMPLETION OF RADIATION THERAPY:   5 months    SUBJECTIVE:   Ms. Kapoor is a 56 year old female with a PMH significant for relapsing multiple sclerosis initially diagnosed at age 21. She was diagnosed with a right optic nerve sheath meningioma in 2021 with imaging demonstrating thickening and enhancement of the right optic nerve sheath beginning just proximal to the entrance to the optic canal and extending into the intraorbital segment near the orbital apex. She underwent fractionated external beam radiation therapy as described above.    Ms. Kapoor returns to radiation oncology clinic today for a routine posttreatment disease surveillance visit. Overall, she reports that she is doing well and has no pressing concerns or complaints.  She has been treated with a course of high-dose prednisone due to worsening vision within the eye following completion of her radiation therapy course and imaging demonstrating slightly increased enhancement of the right optic nerve sheath. She did not appreciate any substantial improvement in her vision immediately following steroid therapy. She does, though, feel that her vision is perhaps very slightly improved over the past 2 months although she continues to be nearly blind within the right eye with only detection of some motion.    OBJECTIVE:  Vitals  BP: 143/78  Pulse: 86  Weight: 91.6 kg    General: Healthy-appearing 56-year-old female  seated comfortably in a chair in no acute distress  HEENT: NC/AT.  EOMI.  No rhinorrhea or epistaxis.  No alopecia.  Pulmonary: No wheezing, stridor or respiratory distress  Neuro: A/O x3.  5/5 motor strength bilateral upper/lower extremities  Skin: Normal color and turgor  Cranial Nerve Exam  I: Not tested  II: 20/20 vision in left eye. Light perception in right eye.  III/IV/VI: PERRL. EOMI.   V: Sensation to light touch is intact and symmetric in the bilateral V1-V3 distributions.  VII: No facial weakness or asymmetry.   VIII: Hearing is grossly intact and symmetric.  IX/X: Palate elevates symmetrically. Normal phonation.  XI: Strength is 5/5 in bilateral trapezius and SCM musculature.  XII: Tongue protrudes in the midline. No atrophy or fasciculations.     LABS AND IMAGIN2021 brain MRI:  Stable appearance of right optic nerve sheath meningioma    IMPRESSION:   Ms. Kapoor is a 55 year old female with a right optic nerve sheath meningioma. She is just over 5 months out from the completion of radiotherapy with stable clinical and radiographic findings.    PLAN:   Follow-up in radiation oncology clinic in 6 months with a repeat MRI    Abdulaziz Pride MD/PhD    Department of Radiation Oncology  [facility]      FOLLOW-UP VISIT    Patient Name: Shakila Kapoor      : 1965     Age: 56 year old        ______________________________________________________________________________     Chief Complaint   Patient presents with     Cancer     Follow up:Menigioma:Right optic nerve 5040 cGy completed 21     Pain  Denies    Labs  Other Labs: No    Imaging  MRI: today      Other Appointments:     MD Name:  Appointment Date:    MD Name: Appointment Date:   MD Name: Appointment Date:   Other Appointment Notes:     Residual Radiation side effect: No concerns     Additional Instructions:     Nurse face-to-face time: Level 3:  10 min face to face time    Abdulaziz Pride,  MD

## 2021-09-29 NOTE — PROGRESS NOTES
Assessment & Plan     Shakila Kapoor is a 56 year old female with the following diagnoses:   1. Partial optic atrophy    2. Visual field defect    3. 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  Past medical history of relapsing remitting multiple sclerosis She had lost vision in the spring and felt possibly due to meningioma likely compressed the optic nerve in the canal.      MRI performed same day and showed slightly increased enhancement of the right optic nerve sheath meningoma and new T2 hyperintense lesion of the right anterior temporal lobe.  She was started on prednisone 1250 mg for 3 days to reduce inflammation seen on the scan.     It is my impression that patient has chronic disc edema due to presumed meningioma.  Her vision worsened following radiation therapy.  This has not improved.  She also has a history of multiple sclerosis.  She experienced no change in vision after high-dose prednisone.  At this time, I gave her monocular precautions.  I think her vision is probably can remain stable.  Follow up in 1 year or sooner as needed for worsening symptoms.          Attending Physician Attestation:  Complete documentation of historical and exam elements from today's encounter can be found in the full encounter summary report (not reduplicated in this progress note).  I personally obtained the chief complaint(s) and history of present illness.  I confirmed and edited as necessary the review of systems, past medical/surgical history, family history, social history, and examination findings as documented by others; and I examined the patient myself.  I personally reviewed the relevant tests, images, and reports as documented above.  I formulated and edited as necessary the assessment and plan and discussed the findings and management plan with the patient and family.  - Samuel Soto MD

## 2021-09-29 NOTE — PROGRESS NOTES
Department of Radiation Oncology  [facility]  [address] [place] [address]  [phone]       Radiation Oncology Follow-up Visit  2021      Shakila Kapoor  MRN: [id number]   : 1965     DISEASE TREATED:   Right optic nerve sheath meningioma    RADIATION THERAPY DELIVERED:   Right optic nerve: 5040 cGy in 28 fractions, from 3/8/2021 - 2021    SYSTEMIC THERAPY:  None    INTERVAL SINCE COMPLETION OF RADIATION THERAPY:   5 months    SUBJECTIVE:   Ms. Kapoor is a 56 year old female with a PMH significant for relapsing multiple sclerosis initially diagnosed at age 21. She was diagnosed with a right optic nerve sheath meningioma in 2021 with imaging demonstrating thickening and enhancement of the right optic nerve sheath beginning just proximal to the entrance to the optic canal and extending into the intraorbital segment near the orbital apex. She underwent fractionated external beam radiation therapy as described above.    Ms. Kapoor returns to radiation oncology clinic today for a routine posttreatment disease surveillance visit. Overall, she reports that she is doing well and has no pressing concerns or complaints.  She has been treated with a course of high-dose prednisone due to worsening vision within the eye following completion of her radiation therapy course and imaging demonstrating slightly increased enhancement of the right optic nerve sheath. She did not appreciate any substantial improvement in her vision immediately following steroid therapy. She does, though, feel that her vision is perhaps very slightly improved over the past 2 months although she continues to be nearly blind within the right eye with only detection of some motion.    OBJECTIVE:  Vitals  BP: 143/78  Pulse: 86  Weight: 91.6 kg    General: Healthy-appearing 56-year-old female seated comfortably in a chair in no acute distress  HEENT: NC/AT.  EOMI.  No rhinorrhea or  epistaxis.  No alopecia.  Pulmonary: No wheezing, stridor or respiratory distress  Neuro: A/O x3.  5/5 motor strength bilateral upper/lower extremities  Skin: Normal color and turgor  Cranial Nerve Exam  I: Not tested  II: 20/20 vision in left eye. Light perception in right eye.  III/IV/VI: PERRL. EOMI.   V: Sensation to light touch is intact and symmetric in the bilateral V1-V3 distributions.  VII: No facial weakness or asymmetry.   VIII: Hearing is grossly intact and symmetric.  IX/X: Palate elevates symmetrically. Normal phonation.  XI: Strength is 5/5 in bilateral trapezius and SCM musculature.  XII: Tongue protrudes in the midline. No atrophy or fasciculations.     LABS AND IMAGIN2021 brain MRI:  Stable appearance of right optic nerve sheath meningioma    IMPRESSION:   Ms. Kapoor is a 55 year old female with a right optic nerve sheath meningioma. She is just over 5 months out from the completion of radiotherapy with stable clinical and radiographic findings.    PLAN:   Follow-up in radiation oncology clinic in 6 months with a repeat MRI    Abdulaziz Pride MD/PhD    Department of Radiation Oncology  [facility]

## 2021-09-30 ENCOUNTER — VIRTUAL VISIT (OUTPATIENT)
Dept: NEUROLOGY | Facility: CLINIC | Age: 56
End: 2021-09-30
Attending: PSYCHIATRY & NEUROLOGY
Payer: COMMERCIAL

## 2021-09-30 ENCOUNTER — MYC MEDICAL ADVICE (OUTPATIENT)
Dept: NEUROLOGY | Facility: CLINIC | Age: 56
End: 2021-09-30

## 2021-09-30 DIAGNOSIS — R93.7 ABNORMAL MRI, CERVICAL SPINE: Primary | ICD-10-CM

## 2021-09-30 DIAGNOSIS — E55.9 VITAMIN D DEFICIENCY: ICD-10-CM

## 2021-09-30 PROCEDURE — 99215 OFFICE O/P EST HI 40 MIN: CPT | Mod: 95 | Performed by: NURSE PRACTITIONER

## 2021-09-30 NOTE — PROGRESS NOTES
"shakila is a 56 year old who is being evaluated via a billable video visit.      How would you like to obtain your AVS? MyChart  If the video visit is dropped, the invitation should be resent by: Other e-mail: [employer] for [employer]  Will anyone else be joining your video visit? No      Video Start Time: 1158  Video-Visit Details    Type of service:  Video Visit    Video End Time:1228    Originating Location (pt. Location): Home    Distant Location (provider location):  [facility] MULTIPLE SCLEROSIS CLINIC [place]     Platform used for Video Visit: [employer]   [facility] MS CLINIC VIRTUAL VISIT     ** Due to the recommendations of public health authorities, previously scheduled office visit was converted to a video visit to limit unnecessary exposure in light of the COVID-19 pandemic**      Shakila is a 56 year old female who is being evaluated via a billable video visit on 9/30/2021 for the diagnosis of Multiple Sclerosis.      HISTORY OF PRESENT ILLNESS:   Date of onset: 1987  Date of diagnosis of MS: Suspected initially, confirmed in 2019  Disease course at onset: Relapsing Remitting  Current disease course: Relapsing Remitting  Previous disease therapies: Rebif  Current disease therapy: Zeposia since 9/29/2021    INTERVAL HISTORY SINCE LAST VISIT: Most recent visit with Dr. Sharma on 9/1/2021. She stopped Rebif on 9/27 and started Zeposia on 9/29 last night. She tolerated it well. No side effects noted.     She has a h/o meningioma involving optic canal and follows up with Dr. Soto. Most recent visit with him on 9/29. She has total vision loss in her right eye. Describe her vision as \"looking through wax paper\". She underwent 5 weeks of radiation in 3/2021. She wears eye glasses. Denies any concerns with speech, swallowing or hearing.     She reports constant numbness in her fingertips and intermittent numbness in her feet. She reports some balance issues from vertigo since yesterday. This is not a new " "symptom for her. She sleeps 8-9 hours at night but still feels fatigued in the morning. Sleep evaluation was discussed with her during her last appointment.     Overall her mood has been okay, but lately her vision issues has been \"hitting her harder\". She is on Paxil and follows up with a psychiatrist. Denies suicidal thoughts or ideations. For neurogenic bladder, she is currently on Oxybutynin. Denies bowel issues.      Vit D: Not on any supplementation.    Past Medical History:   Diagnosis Date     Meningioma (H)      Multiple sclerosis (H)        Past Surgical History:   Procedure Laterality Date     NO HISTORY OF SURGERY         Family History   Problem Relation Age of Onset     Lung Cancer Maternal Grandmother      Lung Cancer Maternal Grandfather      Lung Cancer Paternal Grandmother      Glaucoma No family hx of      Macular Degeneration No family hx of        Social History     Tobacco Use     Smoking status: Never Smoker     Smokeless tobacco: Never Used   Substance Use Topics     Alcohol use: Yes     Comment: rarely         9/29/2021 MRI Brain:  1. No significant change in presumed right optic nerve sheath  meningioma compared to 7/28/2021.   2. Resolution of the T2 hyperintense enhancing active demyelination  within the right anterior temporal lobe. Other lesions are unchanged.  No new demyelinating lesion.    7/2021 MRI Brain/ orbits:  Impression:  In this patient with history of multiple sclerosis,  1. Slightly increased presumed right optic nerve sheath meningioma,  compared to 2/25/2021.  2. T2 hyperintense enhancing lesion within the right anterior temporal  white matter is new compared to 2/25/2021, compatible with active  demyelination.    PHYSICAL EXAM: N/A (video visit)     ASSESSMENT/ PLAN:   1. Multiple Sclerosis, on Zeposia since 9/29/2021: Initial symptoms in 1987. She was given the diagnosis of probable Multiple Sclerosis after abnormal MRI and LP findings. She was started on Rebif after an " episode of ON in 9/2019. Follow-up imaging in 7/2021 showed new enhancing lesions in brain and C spine and treatment switch was recommended by Dr. Sharma.     Patient started Zeposia on 9/29/2021. She will continue this and will follow-up with Dr. Sharma in 1/2021 as scheduled.     2. H/o  meningioma involving optic canal, s/p radiation therapy: Patient follows up with Dr. Soto regularly.     3. Vit D supplementation: Will check her level today and will decide on dosing based on it.     4. Cervical spine stenosis: Per prior clinic note from Dr. Sharma on 9/1, we would also like tot have neurosurgery weigh in on new C spine lesion. It is at the level of 2 herniated discs and although slight CSF space can be seen posterior to the cord, we would like neurosurgery to weight in on whether there is concern for cord compression (as opposed to a new MS lesion). Neurosurgery consult placed.     5. Please contact us with questions or concerns.     Marsha Be CNP  [facility] Neurology      60 minutes spent on the date of the encounter on chart review, history and examination, documentation and further activities as noted above

## 2021-09-30 NOTE — PATIENT INSTRUCTIONS
1. Continue Zeposia.    2. Check Vit D level at your convenience.    3. Follow-up with Dr. Sharma as scheduled.     4. Contact your PCP for sleep study referral.

## 2021-09-30 NOTE — LETTER
"9/30/2021       RE: Shakila Kapoor  [address] [place] [address]     Dear Colleague,    Thank you for referring your patient, Shakila Kapoor, to the [facility] MULTIPLE SCLEROSIS CLINIC [place] at [facility]. Please see a copy of my visit note below.    [facility] MS CLINIC VIRTUAL VISIT     ** Due to the recommendations of public health authorities, previously scheduled office visit was converted to a video visit to limit unnecessary exposure in light of the COVID-19 pandemic**      Shakila is a 56 year old female who is being evaluated via a billable video visit on 9/30/2021 for the diagnosis of Multiple Sclerosis.      HISTORY OF PRESENT ILLNESS:   Date of onset: 1987  Date of diagnosis of MS: Suspected initially, confirmed in 2019  Disease course at onset: Relapsing Remitting  Current disease course: Relapsing Remitting  Previous disease therapies: Rebif  Current disease therapy: Zeposia since 9/29/2021    INTERVAL HISTORY SINCE LAST VISIT: Most recent visit with Dr. Sharma on 9/1/2021. She stopped Rebif on 9/27 and started Zeposia on 9/29 last night. She tolerated it well. No side effects noted.     She has a h/o meningioma involving optic canal and follows up with Dr. Soto. Most recent visit with him on 9/29. She has total vision loss in her right eye. Describe her vision as \"looking through wax paper\". She underwent 5 weeks of radiation in 3/2021. She wears eye glasses. Denies any concerns with speech, swallowing or hearing.     She reports constant numbness in her fingertips and intermittent numbness in her feet. She reports some balance issues from vertigo since yesterday. This is not a new symptom for her. She sleeps 8-9 hours at night but still feels fatigued in the morning. Sleep evaluation was discussed with her during her last appointment.     Overall her mood has been okay, but lately her vision issues has been " "\"hitting her harder\". She is on Paxil and follows up with a psychiatrist. Denies suicidal thoughts or ideations. For neurogenic bladder, she is currently on Oxybutynin. Denies bowel issues.      Vit D: Not on any supplementation.    Past Medical History:   Diagnosis Date     Meningioma (H)      Multiple sclerosis (H)        Past Surgical History:   Procedure Laterality Date     NO HISTORY OF SURGERY         Family History   Problem Relation Age of Onset     Lung Cancer Maternal Grandmother      Lung Cancer Maternal Grandfather      Lung Cancer Paternal Grandmother      Glaucoma No family hx of      Macular Degeneration No family hx of        Social History     Tobacco Use     Smoking status: Never Smoker     Smokeless tobacco: Never Used   Substance Use Topics     Alcohol use: Yes     Comment: rarely         9/29/2021 MRI Brain:  1. No significant change in presumed right optic nerve sheath  meningioma compared to 7/28/2021.   2. Resolution of the T2 hyperintense enhancing active demyelination  within the right anterior temporal lobe. Other lesions are unchanged.  No new demyelinating lesion.    7/2021 MRI Brain/ orbits:  Impression:  In this patient with history of multiple sclerosis,  1. Slightly increased presumed right optic nerve sheath meningioma,  compared to 2/25/2021.  2. T2 hyperintense enhancing lesion within the right anterior temporal  white matter is new compared to 2/25/2021, compatible with active  demyelination.    PHYSICAL EXAM: N/A (video visit)     ASSESSMENT/ PLAN:   1. Multiple Sclerosis, on Zeposia since 9/29/2021: Initial symptoms in 1987. She was given the diagnosis of probable Multiple Sclerosis after abnormal MRI and LP findings. She was started on Rebif after an episode of ON in 9/2019. Follow-up imaging in 7/2021 showed new enhancing lesions in brain and C spine and treatment switch was recommended by Dr. Sharma.     Patient started Zeposia on 9/29/2021. She will continue this and will " follow-up with Dr. Sharma in 1/2021 as scheduled.     2. H/o  meningioma involving optic canal, s/p radiation therapy: Patient follows up with Dr. Soto regularly.     3. Vit D supplementation: Will check her level today and will decide on dosing based on it.     4. Cervical spine stenosis: Per prior clinic note from Dr. Sharma on 9/1, we would also like tot have neurosurgery weigh in on new C spine lesion. It is at the level of 2 herniated discs and although slight CSF space can be seen posterior to the cord, we would like neurosurgery to weight in on whether there is concern for cord compression (as opposed to a new MS lesion). Neurosurgery consult placed.     5. Please contact us with questions or concerns.     Marsha Be CNP  [facility] Neurology      60 minutes spent on the date of the encounter on chart review, history and examination, documentation and further activities as noted above        Again, thank you for allowing me to participate in the care of your patient.      Sincerely,    CARLOS Don CNP

## 2021-10-12 ENCOUNTER — LAB (OUTPATIENT)
Dept: LAB | Facility: CLINIC | Age: 56
End: 2021-10-12
Payer: COMMERCIAL

## 2021-10-12 DIAGNOSIS — E55.9 VITAMIN D DEFICIENCY: ICD-10-CM

## 2021-10-12 PROCEDURE — 36415 COLL VENOUS BLD VENIPUNCTURE: CPT

## 2021-10-12 PROCEDURE — 82306 VITAMIN D 25 HYDROXY: CPT

## 2021-10-13 LAB — DEPRECATED CALCIDIOL+CALCIFEROL SERPL-MC: 37 UG/L (ref 20–75)

## 2021-10-29 NOTE — TELEPHONE ENCOUNTER
Prior Authorization Approval    Authorization Effective Date: 9/2/2021  Authorization Expiration Date: 9/2/2022  Medication: ZEPOSIA  Approved Dose/Quantity: 30 for 30 days  Reference #:     Insurance Company:    Expected CoPay:       CoPay Card Available:      Foundation Assistance Needed:    Which Pharmacy is filling the prescription (Not needed for infusion/clinic administered): [facility] - [place], [place] - [address]  Pharmacy Notified: Yes  Patient Notified: Yes

## 2022-01-02 ENCOUNTER — MYC MEDICAL ADVICE (OUTPATIENT)
Dept: NEUROLOGY | Facility: CLINIC | Age: 57
End: 2022-01-02
Payer: COMMERCIAL

## 2022-01-02 DIAGNOSIS — N31.9 NEUROGENIC BLADDER: Primary | ICD-10-CM

## 2022-01-04 NOTE — TELEPHONE ENCOUNTER
Patient requesting refill of their oxybutynin; Patient was last seen in September and has follow up appointment in January with Dr. Sharma. Pended rx to Dr. Sharma for signature and will send electronically to the pharmacy once signed.    Bee Kwon RN

## 2022-01-06 RX ORDER — OXYBUTYNIN CHLORIDE 10 MG/1
10 TABLET, EXTENDED RELEASE ORAL DAILY
Qty: 30 TABLET | Refills: 3 | Status: SHIPPED | OUTPATIENT
Start: 2022-01-06 | End: 2022-05-09

## 2022-01-12 ENCOUNTER — VIRTUAL VISIT (OUTPATIENT)
Dept: NEUROLOGY | Facility: CLINIC | Age: 57
End: 2022-01-12
Attending: PSYCHIATRY & NEUROLOGY
Payer: COMMERCIAL

## 2022-01-12 DIAGNOSIS — R53.83 OTHER FATIGUE: ICD-10-CM

## 2022-01-12 DIAGNOSIS — G35 MS (MULTIPLE SCLEROSIS) (H): Primary | ICD-10-CM

## 2022-01-12 DIAGNOSIS — R93.7 ABNORMAL MRI, CERVICAL SPINE: ICD-10-CM

## 2022-01-12 PROCEDURE — 99214 OFFICE O/P EST MOD 30 MIN: CPT | Mod: 95 | Performed by: PSYCHIATRY & NEUROLOGY

## 2022-01-12 RX ORDER — AMANTADINE HYDROCHLORIDE 100 MG/1
100 CAPSULE, GELATIN COATED ORAL 2 TIMES DAILY
Qty: 60 CAPSULE | Refills: 4 | Status: SHIPPED | OUTPATIENT
Start: 2022-01-12 | End: 2022-05-25

## 2022-01-12 NOTE — PROGRESS NOTES
aydee is a 56 year old who is being evaluated via a billable video visit.      How would you like to obtain your AVS? [email or web address]  If the video visit is dropped, the invitation should be resent by: Other e-mail: [email or web address]  Will anyone else be joining your video visit? No      Video Start Time: 3:40 PM  Video-Visit Details    Type of service:  Video Visit    Video End Time:4:05 PM    Originating Location (pt. Location): Home    Distant Location (provider location):  [facility] MULTIPLE SCL[facility]OS[facility] CLINIC [place]     Platform used for Video Visit: [employer] [facility] MULTIPLE [facility] CLINIC  FOLLOW UP VISIT           PRINCIPAL NEUROLOGIC DIAGNOSIS: Multiple Sclerosis       HISTORY OF PRESENT ILLNESS:   Date of onset: 1987  Date of diagnosis of MS: Suspected initially, confirmed in 2019  Disease course at onset: Relapsing Remitting  Current disease course: Relapsing Remitting  Previous disease therapies: Rebif  Current disease therapy: Zeposia since 9/29/2021    HISTORY OF ILLNESS:    This is a follow visit for this 56 year old right handed genetic female  With a history of MS. Who was last seen on  9-21 by Marsha Be NP.     At that time the patient was recommended to:    Multiple Sclerosis, on Zeposia since 9/29/2021: Initial symptoms in 1987. She was given the diagnosis of probable Multiple Sclerosis after abnormal MRI and LP findings. She was started on Rebif after an episode of ON in 9/2019. Follow-up imaging in 7/2021 showed new enhancing lesions in brain and C spine and treatment switch was recommended by Dr. Sharma.      Patient started Zeposia on 9/29/2021. She will continue this and will follow-up with Dr. Sharma in 1/2021 as scheduled    Since last visit she denies any new neurological symptoms or anything that could be explained by multiple sclerosis exacerbation.  She really likes the new medications Zeposia as she thinks it causes less fatigue and will be.  She reports a  recent fall while turning so we went over importance of fall precautions and some of her most frequent reasons for falling which is being tired at the end of the day, cracks in the pavement and getting up in the middle of the night to go to the bathroom.    Current Symptoms:  1.  Fatigue  2.  Occasional balance issues    She has not undergone blood work to look for occult toxicity to Zeposia so was encouraged to do so in the next 2 weeks.    Today we discussed amantadine and Nuvigil for fatigue, she has tried Provigil before but it causes insomnia so she is willing to try amantadine which is milder and has less side effects.    Current Outpatient Prescriptions:  Current Outpatient Medications   Medication     oxybutynin ER (DITROPAN-XL) 10 MG 24 hr tablet     PARoxetine (PAXIL) 30 MG tablet     White Petrolatum-Mineral Oil (REFRESH P.M.) OINT     ZEPOSIA STARTER KIT 0.23MG & 0.46MG & 0.92MG CPPK     No current facility-administered medications for this visit.          ALLERGIES       Allergies   Allergen Reactions     Penicillins      Other reaction(s): *Unknown       ASSESSMENT:    Relapsing remitting multiple sclerosis, clinically stable on Zeposia for the past 4 months.  Fatigue an issue, recent fall.    PLAN:    Continue current management and repeat blood work to look for occult toxicity to Zeposia.    Start amantadine 100 mg with breakfast and 100 with lunch to help with MS related fatigue, if this dose is not effective she can try 200 mg twice a day not past 2 PM.  If the high-dose amantadine is not effective she should consider low-dose Nuvigil 75 mg a day.    Repeat MRI of the brain, cervical and thoracic spine with and without contrast to confirm disease stability from the imaging standpoint.    Finally I will follow the patient up in 4 month(s) as long as the patient is doing well. I instructed the patient to call or mychart my office with any concerns or questions.      My recommendations will be  communicated back to the patient's physician(s) by mail.  Follow-up is expected to be with me.      Savannah Sharma MD  Chief, Multiple Sclerosis Division  Department of Neurology  [facility] [place]      BILLING TIME DOCUMENTATION:   The total TIME spent on this patient on the date of the encounter/appointment was 30 minutes.       TOTAL TIME includes:   Time spent preparing to see the patient (reviewing records and tests)   Time spent face to face (or over the phone) with the patient   Time spent ordering tests, medications, procedures and referrals  Time spent documenting clinical information in Epic  Time discussing the case with other providers

## 2022-01-12 NOTE — LETTER
Date:January 27, 2022      Patient was self referred, no letter generated. Do not send.        [facility] Health Information

## 2022-01-12 NOTE — LETTER
1/12/2022       RE: Shakila Kapoor  [address] [place] [address]     Dear Colleague,    Thank you for referring your patient, Shakila Kapoor, to the [facility] MULTIPLE SCL[facility]OSIS CLINIC [place] [facility]. Please see a copy of my visit note below.    shakila is a 56 year old who is being evaluated via a billable video visit.      How would you like to obtain your AVS? [email or web address]  If the video visit is dropped, the invitation should be resent by: Other e-mail: [employer]  Will anyone else be joining your video visit? No      Video Start Time: 3:40 PM  Video-Visit Details    Type of service:  Video Visit    Video End Time:4:05 PM    Originating Location (pt. Location): Home    Distant Location (provider location):  [facility] MULTIPLE SCLEROS[facility] CLINIC [place]     Platform used for Video Visit: [place]  [facility]LE [facility]  FOLLOW UP VISIT           PRINCIPAL NEUROLOGIC DIAGNOSIS: Multiple Sclerosis       HISTORY OF PRESENT ILLNESS:   Date of onset: 1987  Date of diagnosis of MS: Suspected initially, confirmed in 2019  Disease course at onset: Relapsing Remitting  Current disease course: Relapsing Remitting  Previous disease therapies: Rebif  Current disease therapy: Zeposia since 9/29/2021    HISTORY OF ILLNESS:    This is a follow visit for this 56 year old right handed genetic female  With a history of MS. Who was last seen on  9-21 by Marsha Be NP.     At that time the patient was recommended to:    Multiple Sclerosis, on Zeposia since 9/29/2021: Initial symptoms in 1987. She was given the diagnosis of probable Multiple Sclerosis after abnormal MRI and LP findings. She was started on Rebif after an episode of ON in 9/2019. Follow-up imaging in 7/2021 showed new enhancing lesions in brain and C spine and treatment switch was recommended by Dr. Sharma.      Patient started Zeposia on 9/29/2021.  She will continue this and will follow-up with Dr. Sharma in 1/2021 as scheduled    Since last visit she denies any new neurological symptoms or anything that could be explained by multiple sclerosis exacerbation.  She really likes the new medications Zeposia as she thinks it causes less fatigue and will be.  She reports a recent fall while turning so we went over importance of fall precautions and some of her most frequent reasons for falling which is being tired at the end of the day, cracks in the pavement and getting up in the middle of the night to go to the bathroom.    Current Symptoms:  1.  Fatigue  2.  Occasional balance issues    She has not undergone blood work to look for occult toxicity to Zeposia so was encouraged to do so in the next 2 weeks.    Today we discussed amantadine and Nuvigil for fatigue, she has tried Provigil before but it causes insomnia so she is willing to try amantadine which is milder and has less side effects.    Current Outpatient Prescriptions:  Current Outpatient Medications   Medication     oxybutynin ER (DITROPAN-XL) 10 MG 24 hr tablet     PARoxetine (PAXIL) 30 MG tablet     White Petrolatum-Mineral Oil (REFRESH P.M.) OINT     ZEPOSIA STARTER KIT 0.23MG & 0.46MG & 0.92MG CPPK     No current facility-administered medications for this visit.          ALLERGIES       Allergies   Allergen Reactions     Penicillins      Other reaction(s): *Unknown       ASSESSMENT:    Relapsing remitting multiple sclerosis, clinically stable on Zeposia for the past 4 months.  Fatigue an issue, recent fall.    PLAN:    Continue current management and repeat blood work to look for occult toxicity to Zeposia.    Start amantadine 100 mg with breakfast and 100 with lunch to help with MS related fatigue, if this dose is not effective she can try 200 mg twice a day not past 2 PM.  If the high-dose amantadine is not effective she should consider low-dose Nuvigil 75 mg a day.    Repeat MRI of the brain,  cervical and thoracic spine with and without contrast to confirm disease stability from the imaging standpoint.    Finally I will follow the patient up in 4 month(s) as long as the patient is doing well. I instructed the patient to call or mychart my office with any concerns or questions.      My recommendations will be communicated back to the patient's physician(s) by mail.  Follow-up is expected to be with me.      Savannah Sharma MD  Chief, Multiple Sclerosis Division  Department of Neurology  [facility]      BILLING TIME DOCUMENTATION:   The total TIME spent on this patient on the date of the encounter/appointment was 30 minutes.       TOTAL TIME includes:   Time spent preparing to see the patient (reviewing records and tests)   Time spent face to face (or over the phone) with the patient   Time spent ordering tests, medications, procedures and referrals  Time spent documenting clinical information in Epic  Time discussing the case with other providers              Again, thank you for allowing me to participate in the care of your patient.      Sincerely,    Savannah Sharma MD

## 2022-01-27 ENCOUNTER — MYC MEDICAL ADVICE (OUTPATIENT)
Dept: NEUROSURGERY | Facility: CLINIC | Age: 57
End: 2022-01-27

## 2022-01-31 ENCOUNTER — OFFICE VISIT (OUTPATIENT)
Dept: NEUROSURGERY | Facility: CLINIC | Age: 57
End: 2022-01-31
Attending: PSYCHIATRY & NEUROLOGY
Payer: COMMERCIAL

## 2022-01-31 VITALS
OXYGEN SATURATION: 98 % | WEIGHT: 199 LBS | BODY MASS INDEX: 33.12 KG/M2 | HEART RATE: 95 BPM | DIASTOLIC BLOOD PRESSURE: 93 MMHG | SYSTOLIC BLOOD PRESSURE: 162 MMHG

## 2022-01-31 DIAGNOSIS — M48.02 CERVICAL STENOSIS OF SPINAL CANAL: ICD-10-CM

## 2022-01-31 DIAGNOSIS — G35 MS (MULTIPLE SCLEROSIS) (H): ICD-10-CM

## 2022-01-31 PROCEDURE — 99203 OFFICE O/P NEW LOW 30 MIN: CPT | Performed by: NEUROLOGICAL SURGERY

## 2022-01-31 ASSESSMENT — PAIN SCALES - GENERAL: PAINLEVEL: NO PAIN (0)

## 2022-01-31 NOTE — NURSING NOTE
"Shakila Kapoor is a 56 year old female who presents for:  Chief Complaint   Patient presents with     Neurologic Problem     MS; surgical decompression of cervical spine     Consult        Initial Vitals:  BP (!) 162/93   Pulse 95   Wt 199 lb (90.3 kg)   SpO2 98%   BMI 33.12 kg/m   Estimated body mass index is 33.12 kg/m  as calculated from the following:    Height as of 9/1/21: 5' 5\" (1.651 m).    Weight as of this encounter: 199 lb (90.3 kg).. Body surface area is 2.04 meters squared. BP completed using cuff size: regular  No Pain (0)    Martinez Romero MA    "

## 2022-01-31 NOTE — LETTER
1/31/2022         RE: Shakila Kapoor  [address] [place] [address]        Dear Colleague,    Thank you for referring your patient, Shakila Kapoor, to the [facility]. Please see a copy of my visit note below.    I was asked by Dr. Sharma to see this patient in consultation    56 year old female with MS, cervical stenosis.  Has been managed for MS over the last few years, and undergone imaging in the course of management.  Has been clinically stable recently, with slight gait ataxia, but no significant neck or arm pain and no fine motor issues.  MR Cervical shows MS changes and C4-5 and C5-6 disc bulges with moderate stenosis.       Past Medical History:   Diagnosis Date     Meningioma (H)      Multiple sclerosis (H)      Past Surgical History:   Procedure Laterality Date     NO HISTORY OF SURGERY       Social History     Socioeconomic History     Marital status:      Spouse name: Not on file     Number of children: Not on file     Years of education: Not on file     Highest education level: Not on file   Occupational History     Not on file   Tobacco Use     Smoking status: Never Smoker     Smokeless tobacco: Never Used   Substance and Sexual Activity     Alcohol use: Yes     Comment: rarely     Drug use: Never     Sexual activity: Not on file   Other Topics Concern     Not on file   Social History Narrative     Not on file     Social Determinants of Health     Financial Resource Strain: Not on file   Food Insecurity: Not on file   Transportation Needs: Not on file   Physical Activity: Not on file   Stress: Not on file   Social Connections: Not on file   Intimate Partner Violence: Not on file   Housing Stability: Not on file     Family History   Problem Relation Age of Onset     Lung Cancer Maternal Grandmother      Lung Cancer Maternal Grandfather      Lung Cancer Paternal Grandmother      Glaucoma No family hx of      Macular Degeneration No family hx of          ROS: 10 point ROS neg other than the symptoms noted above in the HPI.    Physical Exam  BP (!) 162/93   Pulse 95   Wt 90.3 kg (199 lb)   SpO2 98%   BMI 33.12 kg/m    HEENT:  Normocephalic, atraumatic.  PERRLA.  EOM s intact.  Visual fields full to gross exam  Neck:  Supple, non-tender, without lymphadenopathy.  Heart:  No peripheral edema  Lungs:  No SOB  Abdomen:  Non-distended.   Skin:  Warm and dry.  Extremities:  No edema, cyanosis or clubbing.  Psychiatric:  No apparent distress  Musculoskeletal:  Normal bulk and tone    NEUROLOGICAL EXAMINATION:     Mental status:  Alert and Oriented x 3, speech is fluent.  Cranial nerves:  II-XII intact.   Motor:    Shoulder Abduction:  Right:  5/5   Left:  5/5  Biceps:                      Right:  5/5   Left:  5/5  Triceps:                     Right:  5/5   Left:  5/5  Wrist Extensors:       Right:  5/5   Left:  5/5  Wrist Flexors:           Right:  5/5   Left:  5/5  interosseus :            Right:  5/5   Left:  5/5  Hip Flexion:                Right: 5/5  Left:  5/5  Quadriceps:             Right:  5/5  Left:  5/5  Hamstrings:             Right:  5/5  Left:  5/5  Gastroc Soleus:        Right:  5/5  Left:  5/5  Tib/Ant:                      Right:  5/5  Left:  5/5  EHL:                     Right:  5/5  Left:  5/5  Sensation:  Intact  Reflexes:  Negative Babinski.  Negative Clonus.  Positive right Grewal's.  Coordination:  Smooth finger to nose testing.   Negative pronator drift.  Smooth tandem walking.    A/P:  56 year old female with MS, cervical stenosis    I had a discussion with the patient, reviewing the history, symptoms, and imaging  Significant cervical stenosis, although not clearly clinically symptomatic at this point  Discussed risk of spinal cord injury with trauma in the setting of untreated cervical stenosis  At this point, she prefers to continue to observe clinically and she will contact us if she develops new symptoms         Again, thank you for  allowing me to participate in the care of your patient.        Sincerely,        Claudy Sesay MD

## 2022-02-01 NOTE — PROGRESS NOTES
I was asked by Dr. Sharma to see this patient in consultation    56 year old female with MS, cervical stenosis.  Has been managed for MS over the last few years, and undergone imaging in the course of management.  Has been clinically stable recently, with slight gait ataxia, but no significant neck or arm pain and no fine motor issues.  MR Cervical shows MS changes and C4-5 and C5-6 disc bulges with moderate stenosis.       Past Medical History:   Diagnosis Date     Meningioma (H)      Multiple sclerosis (H)      Past Surgical History:   Procedure Laterality Date     NO HISTORY OF SURGERY       Social History     Socioeconomic History     Marital status:      Spouse name: Not on file     Number of children: Not on file     Years of education: Not on file     Highest education level: Not on file   Occupational History     Not on file   Tobacco Use     Smoking status: Never Smoker     Smokeless tobacco: Never Used   Substance and Sexual Activity     Alcohol use: Yes     Comment: rarely     Drug use: Never     Sexual activity: Not on file   Other Topics Concern     Not on file   Social History Narrative     Not on file     Social Determinants of Health     Financial Resource Strain: Not on file   Food Insecurity: Not on file   Transportation Needs: Not on file   Physical Activity: Not on file   Stress: Not on file   Social Connections: Not on file   Intimate Partner Violence: Not on file   Housing Stability: Not on file     Family History   Problem Relation Age of Onset     Lung Cancer Maternal Grandmother      Lung Cancer Maternal Grandfather      Lung Cancer Paternal Grandmother      Glaucoma No family hx of      Macular Degeneration No family hx of         ROS: 10 point ROS neg other than the symptoms noted above in the HPI.    Physical Exam  BP (!) 162/93   Pulse 95   Wt 90.3 kg (199 lb)   SpO2 98%   BMI 33.12 kg/m    HEENT:  Normocephalic, atraumatic.  PERRLA.  EOM s intact.  Visual fields full to gross  exam  Neck:  Supple, non-tender, without lymphadenopathy.  Heart:  No peripheral edema  Lungs:  No SOB  Abdomen:  Non-distended.   Skin:  Warm and dry.  Extremities:  No edema, cyanosis or clubbing.  Psychiatric:  No apparent distress  Musculoskeletal:  Normal bulk and tone    NEUROLOGICAL EXAMINATION:     Mental status:  Alert and Oriented x 3, speech is fluent.  Cranial nerves:  II-XII intact.   Motor:    Shoulder Abduction:  Right:  5/5   Left:  5/5  Biceps:                      Right:  5/5   Left:  5/5  Triceps:                     Right:  5/5   Left:  5/5  Wrist Extensors:       Right:  5/5   Left:  5/5  Wrist Flexors:           Right:  5/5   Left:  5/5  interosseus :            Right:  5/5   Left:  5/5  Hip Flexion:                Right: 5/5  Left:  5/5  Quadriceps:             Right:  5/5  Left:  5/5  Hamstrings:             Right:  5/5  Left:  5/5  Gastroc Soleus:        Right:  5/5  Left:  5/5  Tib/Ant:                      Right:  5/5  Left:  5/5  EHL:                     Right:  5/5  Left:  5/5  Sensation:  Intact  Reflexes:  Negative Babinski.  Negative Clonus.  Positive right Grewal's.  Coordination:  Smooth finger to nose testing.   Negative pronator drift.  Smooth tandem walking.    A/P:  56 year old female with MS, cervical stenosis    I had a discussion with the patient, reviewing the history, symptoms, and imaging  Significant cervical stenosis, although not clearly clinically symptomatic at this point  Discussed risk of spinal cord injury with trauma in the setting of untreated cervical stenosis  At this point, she prefers to continue to observe clinically and she will contact us if she develops new symptoms

## 2022-04-08 ENCOUNTER — ANCILLARY PROCEDURE (OUTPATIENT)
Dept: MRI IMAGING | Facility: CLINIC | Age: 57
End: 2022-04-08
Attending: RADIOLOGY
Payer: COMMERCIAL

## 2022-04-08 DIAGNOSIS — D32.0 PRIMARY MENINGIOMA OF OPTIC NERVE SHEATH (H): ICD-10-CM

## 2022-04-08 PROCEDURE — 70543 MRI ORBT/FAC/NCK W/O &W/DYE: CPT | Mod: GC | Performed by: RADIOLOGY

## 2022-04-08 PROCEDURE — A9585 GADOBUTROL INJECTION: HCPCS | Performed by: RADIOLOGY

## 2022-04-08 PROCEDURE — 70553 MRI BRAIN STEM W/O & W/DYE: CPT | Mod: GC | Performed by: RADIOLOGY

## 2022-04-08 RX ORDER — GADOBUTROL 604.72 MG/ML
10 INJECTION INTRAVENOUS ONCE
Status: COMPLETED | OUTPATIENT
Start: 2022-04-08 | End: 2022-04-08

## 2022-04-08 RX ADMIN — GADOBUTROL 9 ML: 604.72 INJECTION INTRAVENOUS at 15:39

## 2022-04-08 NOTE — DISCHARGE INSTRUCTIONS
MRI Contrast Discharge Instructions    The IV contrast you received today will pass out of your body in your  urine. This will happen in the next 24 hours. You will not feel this process.  Your urine will not change color.    Drink at least 4 extra glasses of water or juice today (unless your doctor  has restricted your fluids). This reduces the stress on your kidneys.  You may take your regular medicines.    If you are on dialysis: It is best to have dialysis today.    If you have a reaction: Most reactions happen right away. If you have  any new symptoms after leaving the hospital (such as hives or swelling),  call your hospital at the correct number below. Or call your family doctor.  If you have breathing distress or wheezing, call 911.    Special instructions: ***    I have read and understand the above information.    Signature:______________________________________ Date:___________    Staff:__________________________________________ Date:___________     Time:__________    [place] Radiology Departments:    ___[place]: [phone]  ___[facility]: [phone]  ___[place]: [phone] ___[facility]: [phone]  ___[facility]: [phone]  ___[facility]: [phone]  ___[phone]  ___[facility]: [phone]  ___[phone]

## 2022-04-11 NOTE — PROGRESS NOTES
Department of Radiation Oncology  [facility]  [address] [place] [address]  [phone]       Radiation Oncology Video Follow-up Visit  2022    Shakila Kapoor  MRN: [id number]   : 1965     DISEASE TREATED:   Right optic nerve sheath meningioma    RADIATION THERAPY DELIVERED:   Right optic nerve: 5040 cGy in 28 fractions, from 3/8/2021 - 2021    SYSTEMIC THERAPY:  None    INTERVAL SINCE COMPLETION OF RADIATION THERAPY:   1 year    SUBJECTIVE:   Ms. Kapoor is a 56 year old female with a PMH significant for relapsing multiple sclerosis initially diagnosed at age 21. She was diagnosed with a right optic nerve sheath meningioma in 2021 with imaging demonstrating thickening and enhancement of the right optic nerve sheath beginning just proximal to the entrance to the optic canal and extending into the intraorbital segment near the orbital apex. She underwent fractionated external beam radiation therapy as described above.    Ms. Kapoor returns to radiation oncology clinic today for a routine posttreatment disease surveillance visit.  Her MS has remained clinically stable recently and she continues to follow with neurology and neurosurgery. MR brain and orbits on 2022 demonstrated slight decrease in size and contrast enhancement of her presumed right optic nerve sheath meningioma.     Overall, she reports that she is doing well and has no pressing concerns or complaints. She has not had any recent relapses associated with multiple myeloma. She reports some improvement in her right eye vision such that she is now able to see shapes and outlines, but it is nonfunctional. 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    MR brain and cervical and thoracic spine are scheduled for 2022. She is due to see Dr. Soto in ophthalmology in 2022.     OBJECTIVE:  PHYSICAL EXAM (video):  General: Healthy-appearing 56-year-old female seated comfortably in no  acute distress    LABS AND IMAGIN2022 MRI Brain and Orbits  Impression:    In this patient with a history of multiple sclerosis and right optic  nerve sheath meningioma status post radiotherapy:  1. Decreased contrast enhancement along the right optic nerve. Stable  to slightly decreased size of right optic nerve meningioma.  2. Stable appearance of periventricular T2 hyperintensities,  appearance compatible with history of demyelinating disease. No  abnormal contrast enhancement to suggest active demyelination.    IMPRESSION:   Ms. Kapoor is a 56 year old female with a right optic nerve sheath meningioma. She is about one year out from the completion of radiotherapy with improved clinical and radiographic findings.    PLAN:   1. Follow-up in clinic with MRI brain in 1 year  2. Continue follow-up with Dr. Soto in ophthalmology as scheduled for 2022  3. We asked the patient to have labs drawn at her convenience for assessment of pituitary function    A total of 9 minutes were spent on the call (1:24 pm to 1:33 pm).    The patient was seen and assessed with staff, Dr. Pride.    Hussain Loera MD  PGY-5 Radiation Oncology  Clinic: [phone]  Pager: [phone]    Attending addendum:   I saw and examined the patient with the resident and agree with the documented plan of care.    Abdulaziz Pride MD/PhD    Dept of Radiation Oncology  [facility]

## 2022-04-12 ENCOUNTER — ANCILLARY PROCEDURE (OUTPATIENT)
Dept: MAMMOGRAPHY | Facility: CLINIC | Age: 57
End: 2022-04-12
Payer: COMMERCIAL

## 2022-04-12 DIAGNOSIS — Z12.31 VISIT FOR SCREENING MAMMOGRAM: ICD-10-CM

## 2022-04-12 PROCEDURE — 77067 SCR MAMMO BI INCL CAD: CPT | Performed by: STUDENT IN AN ORGANIZED HEALTH CARE EDUCATION/TRAINING PROGRAM

## 2022-04-13 ENCOUNTER — VIRTUAL VISIT (OUTPATIENT)
Dept: RADIATION ONCOLOGY | Facility: CLINIC | Age: 57
End: 2022-04-13
Attending: RADIOLOGY
Payer: COMMERCIAL

## 2022-04-13 DIAGNOSIS — D32.0 PRIMARY MENINGIOMA OF OPTIC NERVE SHEATH (H): Primary | ICD-10-CM

## 2022-04-13 NOTE — PROGRESS NOTES
FOLLOW-UP VISIT    Patient Name: Shakila Kapoor      : 1965     Age: 56 year old        ______________________________________________________________________________     Chief Complaint   Patient presents with     Cancer     Follow up:Menigioma:Right optic nerve 5040 cGy completed 21     Pain  Denies    Labs  Other Labs: No    Imaging  MRI: 22      Other Appointments:     MD Name:  Appointment Date:    MD Name: Appointment Date:   MD Name: Appointment Date:   Other Appointment Notes:     Residual Radiation side effect: Fatigue     Additional Instructions:     Nurse face-to-face time: Level 3:  10 min face to face time

## 2022-05-05 ENCOUNTER — OFFICE VISIT (OUTPATIENT)
Dept: NEUROLOGY | Facility: CLINIC | Age: 57
End: 2022-05-05
Attending: PSYCHIATRY & NEUROLOGY
Payer: COMMERCIAL

## 2022-05-05 ENCOUNTER — LAB (OUTPATIENT)
Dept: LAB | Facility: CLINIC | Age: 57
End: 2022-05-05

## 2022-05-05 ENCOUNTER — ANCILLARY PROCEDURE (OUTPATIENT)
Dept: MRI IMAGING | Facility: CLINIC | Age: 57
End: 2022-05-05
Attending: PSYCHIATRY & NEUROLOGY
Payer: COMMERCIAL

## 2022-05-05 VITALS
HEART RATE: 101 BPM | BODY MASS INDEX: 32.45 KG/M2 | WEIGHT: 195 LBS | SYSTOLIC BLOOD PRESSURE: 158 MMHG | OXYGEN SATURATION: 100 % | DIASTOLIC BLOOD PRESSURE: 70 MMHG

## 2022-05-05 DIAGNOSIS — R26.9 GAIT DISTURBANCE: ICD-10-CM

## 2022-05-05 DIAGNOSIS — G35 MS (MULTIPLE SCLEROSIS) (H): ICD-10-CM

## 2022-05-05 DIAGNOSIS — R53.82 CHRONIC FATIGUE: ICD-10-CM

## 2022-05-05 DIAGNOSIS — D32.0 PRIMARY MENINGIOMA OF OPTIC NERVE SHEATH (H): ICD-10-CM

## 2022-05-05 DIAGNOSIS — M48.02 CERVICAL STENOSIS OF SPINAL CANAL: ICD-10-CM

## 2022-05-05 DIAGNOSIS — G47.19 EXCESSIVE DAYTIME SLEEPINESS: ICD-10-CM

## 2022-05-05 DIAGNOSIS — G35 MS (MULTIPLE SCLEROSIS) (H): Primary | ICD-10-CM

## 2022-05-05 DIAGNOSIS — R06.83 SNORING: ICD-10-CM

## 2022-05-05 LAB
ALBUMIN SERPL-MCNC: 3.6 G/DL (ref 3.4–5)
ALP SERPL-CCNC: 85 U/L (ref 40–150)
ALT SERPL W P-5'-P-CCNC: 24 U/L (ref 0–50)
ANION GAP SERPL CALCULATED.3IONS-SCNC: 7 MMOL/L (ref 3–14)
AST SERPL W P-5'-P-CCNC: 16 U/L (ref 0–45)
BASOPHILS # BLD AUTO: 0 10E3/UL (ref 0–0.2)
BASOPHILS NFR BLD AUTO: 0 %
BILIRUB SERPL-MCNC: 0.4 MG/DL (ref 0.2–1.3)
BUN SERPL-MCNC: 9 MG/DL (ref 7–30)
CALCIUM SERPL-MCNC: 9 MG/DL (ref 8.5–10.1)
CHLORIDE BLD-SCNC: 106 MMOL/L (ref 94–109)
CO2 SERPL-SCNC: 28 MMOL/L (ref 20–32)
CORTIS SERPL-MCNC: 6.9 UG/DL (ref 4–22)
CREAT SERPL-MCNC: 0.8 MG/DL (ref 0.52–1.04)
EOSINOPHIL # BLD AUTO: 0.1 10E3/UL (ref 0–0.7)
EOSINOPHIL NFR BLD AUTO: 2 %
ERYTHROCYTE [DISTWIDTH] IN BLOOD BY AUTOMATED COUNT: 12.6 % (ref 10–15)
FSH SERPL-ACNC: 59.2 IU/L
GFR SERPL CREATININE-BSD FRML MDRD: 87 ML/MIN/1.73M2
GLUCOSE BLD-MCNC: 172 MG/DL (ref 70–99)
HCT VFR BLD AUTO: 44.5 % (ref 35–47)
HGB BLD-MCNC: 15.1 G/DL (ref 11.7–15.7)
IMM GRANULOCYTES # BLD: 0 10E3/UL
IMM GRANULOCYTES NFR BLD: 0 %
LH SERPL-ACNC: 30.5 IU/L
LYMPHOCYTES # BLD AUTO: 0.5 10E3/UL (ref 0.8–5.3)
LYMPHOCYTES NFR BLD AUTO: 10 %
MCH RBC QN AUTO: 30.4 PG (ref 26.5–33)
MCHC RBC AUTO-ENTMCNC: 33.9 G/DL (ref 31.5–36.5)
MCV RBC AUTO: 90 FL (ref 78–100)
MONOCYTES # BLD AUTO: 0.4 10E3/UL (ref 0–1.3)
MONOCYTES NFR BLD AUTO: 7 %
NEUTROPHILS # BLD AUTO: 4.4 10E3/UL (ref 1.6–8.3)
NEUTROPHILS NFR BLD AUTO: 81 %
NRBC # BLD AUTO: 0 10E3/UL
NRBC BLD AUTO-RTO: 0 /100
PLATELET # BLD AUTO: 258 10E3/UL (ref 150–450)
POTASSIUM BLD-SCNC: 4.3 MMOL/L (ref 3.4–5.3)
PROT SERPL-MCNC: 6.8 G/DL (ref 6.8–8.8)
RBC # BLD AUTO: 4.97 10E6/UL (ref 3.8–5.2)
SODIUM SERPL-SCNC: 141 MMOL/L (ref 133–144)
TSH SERPL DL<=0.005 MIU/L-ACNC: 1.75 MU/L (ref 0.4–4)
WBC # BLD AUTO: 5.4 10E3/UL (ref 4–11)

## 2022-05-05 PROCEDURE — 70553 MRI BRAIN STEM W/O & W/DYE: CPT | Performed by: RADIOLOGY

## 2022-05-05 PROCEDURE — 83002 ASSAY OF GONADOTROPIN (LH): CPT | Mod: 90 | Performed by: PATHOLOGY

## 2022-05-05 PROCEDURE — 99215 OFFICE O/P EST HI 40 MIN: CPT | Performed by: PSYCHIATRY & NEUROLOGY

## 2022-05-05 PROCEDURE — 72156 MRI NECK SPINE W/O & W/DYE: CPT | Performed by: RADIOLOGY

## 2022-05-05 PROCEDURE — 82533 TOTAL CORTISOL: CPT | Mod: 90 | Performed by: PATHOLOGY

## 2022-05-05 PROCEDURE — G0463 HOSPITAL OUTPT CLINIC VISIT: HCPCS

## 2022-05-05 PROCEDURE — 99000 SPECIMEN HANDLING OFFICE-LAB: CPT | Performed by: PATHOLOGY

## 2022-05-05 PROCEDURE — A9585 GADOBUTROL INJECTION: HCPCS | Performed by: RADIOLOGY

## 2022-05-05 PROCEDURE — 72157 MRI CHEST SPINE W/O & W/DYE: CPT | Performed by: RADIOLOGY

## 2022-05-05 PROCEDURE — 83001 ASSAY OF GONADOTROPIN (FSH): CPT | Mod: 90 | Performed by: PATHOLOGY

## 2022-05-05 PROCEDURE — 80050 GENERAL HEALTH PANEL: CPT | Performed by: PATHOLOGY

## 2022-05-05 PROCEDURE — 36415 COLL VENOUS BLD VENIPUNCTURE: CPT | Performed by: PATHOLOGY

## 2022-05-05 RX ORDER — GADOBUTROL 604.72 MG/ML
10 INJECTION INTRAVENOUS ONCE
Status: COMPLETED | OUTPATIENT
Start: 2022-05-05 | End: 2022-05-05

## 2022-05-05 RX ADMIN — GADOBUTROL 10 ML: 604.72 INJECTION INTRAVENOUS at 13:18

## 2022-05-05 ASSESSMENT — PAIN SCALES - GENERAL: PAINLEVEL: NO PAIN (0)

## 2022-05-05 NOTE — NURSING NOTE
Chief Complaint   Patient presents with     RECHECK     SIMEON/ PREVIOUS DR PAEZ PATIENT     Cate Aguirre MA

## 2022-05-05 NOTE — PATIENT INSTRUCTIONS
We will refer you to physical therapy for evaluation and management of gait instability    2.  We will also refer you to sleep medicine for investigation of possible sleep disorder breathing    3. Regarding fatigue, I think that you can stop amantadine. We discussed that we recommend an initial target of 90 minutes of aerobic exercise per week    4. Return in 6 months with MRI scan of the cervical spine prior to visit

## 2022-05-05 NOTE — Clinical Note
"5/5/2022       RE: Shakila Kapoor  [address] [place] [address]     Dear Colleague,    Thank you for referring your patient, Shakila Kapoor, to the [facility] [place] at [facility]. Please see a copy of my visit note below.    Date of service: May 5, 2022    Referral source: Established patient of Dr. Savannah Sharma    Chief complaint: Multiple sclerosis    History of the Present Illness: Ms. Shakila Kapoor is a 56-year-old right-handed woman who returns to the Multiple Sclerosis Clinic today to review the results of MRI scans performed earlier on today's date, and to establish care with me after Dr. Sharma's departure.    The patient's history is as documented in the chart.  History of symptoms of demyelinating disease dates back to age 20, at which time she had left-sided sensory changes as well as a probable episode of optic neuritis.  She indicates that after investigations including a CSF examination, she was diagnosed with \"probable/possible multiple sclerosis.\"  Subsequently, he hasd not had any significant neurologic changes for many years.  However, in 2019, she developed progressive difficulty with vision in the right eye.  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  However, due to findings suggestive of demyelinating disease on brain MRI, she was given a formal diagnosis of multiple sclerosis at that time started on disease-modifying therapy with the Rebif formulation of interferon beta.      In 2021, MRI imaging demonstrated progression of new lesions.  She established care with Dr. Sharma and was placed on disease-modifying therapy with ozanimod.  It was also noted that she had significant cervical stenosis at C4-C5 and C5-C6 and was referred to neurosurgery.  After consultation, the patient decided " "not to proceed with elective decompression.    Today, the patient denies any new episodic changes in vision, balance, strength or sensation suggestive of new relapse of multiple sclerosis since she was last seen.    Symptomatically, she describes her gait as \"wonky.\" Her balance is off.  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  This occurred when she lost her balance while turning.      She describes herself as \"always tired.\" Dr. Sharma had given her a prescription for amantadine; she is not taking this consistently.  She relates that her  tells her that she snores and she has wondered about sleep-disordered breathing.    She does have significantly altered visual acuity in the right eye.  She is able to perceive shapes and movements in that eye.    Past Medical History:  1.  Postpartum depression.  2.  Anxiety.    Medications:  1.  Paroxetine 30 mg p.o. daily.  2.  Oxybutynin XL 10 mg p.o. daily.    Family History: She is not aware of any family history of multiple sclerosis or other autoimmune disease.    Social History: The patient is employed as a  at [employer] in the [place] [employer] [place].  She is a lifelong non-smoker.    PHYSICAL EXAMINATION:    VITAL SIGNS:  Blood pressure 158/70; pulse 101; oxygen saturation 100%; weight 88.5 kg.  GENERAL:  Overweight woman who presents to the examination alone, awake and alert and in no acute distress.    NEUROLOGIC EXAMINATION:  CRANIAL NERVES:  Visual acuity is significantly impaired in the right eye ,as above.  Visual fields are full in the left eye to gross confrontation.  Extraocular movements are intact with no internuclear ophthalmoplegia.  Facial strength is normal.  Palate elevation and tongue protrusion are normal.  POWER:  Strength is within normal limits in proximal and distal muscles in the upper and lower limbs throughout with the exception of hip " flexor muscles, which grade 4+ bilaterally.  REFLEXES:  Reflexes are roughly symmetric and within normal limits in the arms.  Reflexes are slightly brisk in the lower limbs, but again relatively symmetric.  MOTOR/CEREBELLAR:  There is no appendicular ataxia on finger-to-nose testing.  Rapid alternating movements are within normal limits in hands and fingers.  There is no pronator drift in the arms.    Investigations: I reviewed images from MRI scans of the brain, cervical and thoracic spine performed earlier on today's date.  MRI scan of the brain demonstrates multiple periventricular and juxtacortical foci of T2 hyperintensity in the white matter of the cerebral hemispheres bilaterally, unchanged in comparison to previous orbital MRI of 04/08/2022 and brain MRI of 09/29/2021.      MRI scans of the cervical and thoracic spine demonstrate multiple short segment foci of T2 hyperintensity in the cord parenchyma, with the distribution of T2 hyperintense lesions grossly unchanged in comparison to previous cervical MRI of 06/21/2021 and thoracic MRI scan of 06/26/2020.  However, there is a new focus of gadolinium enhancement in the cervical cord at C5-C6 (see series 12, image 9), adjacent to the region of maximal stenosis of the cervical spinal canal from disc osteophyte complex.    Assessment/plan:     1.  Multiple sclerosis  2.  Cervical spinal stenosis  I discussed with the patient that her MRI imaging demonstrates an enhancing focus in the cervical cord.  This was read by the interpreting radiologist as a probable focus of active demyelination, and I agree that this is possible.  However, it is also possible that this is signal abnormality due to compressive myelopathy.      I discussed with the patient that I think it is important to try to distinguish which one of these etiologies is in fact the case, and that the most effective way to do so would be to repeat the cervical MRI imaging in 6 months.  If this is a  focus of active demyelination, the contrast enhancement would be expected to have resolved at that time.  However, if the signal abnormality is due to compressive myelopathy is likely that enhancement will persist and if that is the case, we would need to re-evaluate the decision on whether to proceed with surgery.    I discussed with her that cervical spinal stenosis in the setting of known multiple sclerosis is a somewhat difficult situation.  Realistically, the only way to determine whether decompressing her cervical spine would be of any current or future benefit for her would be to fix the problem, and this would not be a minor surgery.      For now, we will continue with close observation and repeat the imaging in 6 months as discussed above.  She will continue ozanimod for now.  I did review the results of laboratory studies performed earlier on today's date including normal liver function tests and normal complete blood counts with differential, apart from low lymphocyte count, which is an expected finding in a person on a sphingosine analogue. Non-fasting glucose was somewhat elevated at 172 mg/dL, and I advised her to review this with primary care.    3.  Gait disturbance  I strongly recommended a physical therapy evaluation to the patient.  Falls are concerning in and of themselves ,and particularly in light of the fairly tight cervical stenosis, she is at risk of severe injury from falling.  She is agreeable to the physical therapy referral and we will so arrange    4.  Chronic fatigue  She has really not been taking amantadine and is uncertain if this was doing anything for her.  I did tell her that she could stop this medication.  We discussed that regular aerobic exercise has been shown to be of benefit for MS-associated fatigue, and I advised an initial goal of 90 minutes of aerobic exercise weekly.  We will also refer her to sleep medicine for further investigation regarding the possibility of  sleep-disordered breathing in light of her report of snoring and non-refreshing sleep.    I spent a total of 52 minutes on patient care activities related to this encounter on the date of service, including time spent reviewing the chart, performing independent review of neuroimaging, obtaining history and examination from and in counseling the patient.    Daniel Harris MD        D: 05/10/2022   T: 05/10/2022   MT: CONSTANZA    Name:     EVERTON MOONEY  MRN:      [id number]        Account:      [id number]   :      1965           Service Date: 2022       Document: [id number]      Again, thank you for allowing me to participate in the care of your patient.      Sincerely,    Daniel Harris MD

## 2022-05-08 DIAGNOSIS — N31.9 NEUROGENIC BLADDER: ICD-10-CM

## 2022-05-09 RX ORDER — OXYBUTYNIN CHLORIDE 10 MG/1
TABLET, EXTENDED RELEASE ORAL
Qty: 30 TABLET | Refills: 11 | Status: SHIPPED | OUTPATIENT
Start: 2022-05-09 | End: 2023-06-02

## 2022-05-09 NOTE — TELEPHONE ENCOUNTER
Received refill request for oxybutynin from [facility]; Previously prescribed by Dr Sharma. Patient was last seen in May by Dr Harris and has follow up appointment in November by Dr Harris.     Flower Barnett RN

## 2022-05-10 NOTE — PROGRESS NOTES
"Date of service: May 5, 2022    Referral source: Established patient of Dr. Savannah Sharma    Chief complaint: Multiple sclerosis    History of the Present Illness: Ms. Shakila Kapoor is a 56-year-old right-handed woman who returns to the Multiple Sclerosis Clinic today to review the results of MRI scans performed earlier on today's date, and to establish care with me after Dr. Sharma's departure.    The patient's history is as documented in the chart.  History of symptoms of demyelinating disease dates back to age 20, at which time she had left-sided sensory changes as well as a probable episode of optic neuritis.  She indicates that after investigations including a CSF examination, she was diagnosed with \"probable/possible multiple sclerosis.\"  Subsequently, he hasd not had any significant neurologic changes for many years.  However, in 2019, she developed progressive difficulty with vision in the right eye.  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  However, due to findings suggestive of demyelinating disease on brain MRI, she was given a formal diagnosis of multiple sclerosis at that time started on disease-modifying therapy with the Rebif formulation of interferon beta.      In 2021, MRI imaging demonstrated progression of new lesions.  She established care with Dr. Sharma and was placed on disease-modifying therapy with ozanimod.  It was also noted that she had significant cervical stenosis at C4-C5 and C5-C6 and was referred to neurosurgery.  After consultation, the patient decided not to proceed with elective decompression.    Today, the patient denies any new episodic changes in vision, balance, strength or sensation suggestive of new relapse of multiple sclerosis since she was last seen.    Symptomatically, she describes her gait as \"wonky.\" Her balance is off.  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  This occurred when she lost her balance while turning.      She describes herself as \"always tired.\" Dr. Sharma had given her a prescription for amantadine; she is not taking this consistently.  She relates that her  tells her that she snores and she has wondered about sleep-disordered breathing.    She does have significantly altered visual acuity in the right eye.  She is able to perceive shapes and movements in that eye.    Past Medical History:  1.  Postpartum depression.  2.  Anxiety.    Medications:  1.  Paroxetine 30 mg p.o. daily.  2.  Oxybutynin XL 10 mg p.o. daily.    Family History: She is not aware of any family history of multiple sclerosis or other autoimmune disease.    Social History: The patient is employed as a  at [employer] in the [employer].  She is a lifelong non-smoker.    PHYSICAL EXAMINATION:    VITAL SIGNS:  Blood pressure 158/70; pulse 101; oxygen saturation 100%; weight 88.5 kg.  GENERAL:  Overweight woman who presents to the examination alone, awake and alert and in no acute distress.    NEUROLOGIC EXAMINATION:  CRANIAL NERVES:  Visual acuity is significantly impaired in the right eye ,as above.  Visual fields are full in the left eye to gross confrontation.  Extraocular movements are intact with no internuclear ophthalmoplegia.  Facial strength is normal.  Palate elevation and tongue protrusion are normal.  POWER:  Strength is within normal limits in proximal and distal muscles in the upper and lower limbs throughout with the exception of hip flexor muscles, which grade 4+ bilaterally.  REFLEXES:  Reflexes are roughly symmetric and within normal limits in the arms.  Reflexes are slightly brisk in the lower limbs, but again relatively symmetric.  MOTOR/CEREBELLAR:  There is no appendicular ataxia on finger-to-nose testing.  Rapid alternating movements are within normal " limits in hands and fingers.  There is no pronator drift in the arms.    Investigations: I reviewed images from MRI scans of the brain, cervical and thoracic spine performed earlier on today's date.  MRI scan of the brain demonstrates multiple periventricular and juxtacortical foci of T2 hyperintensity in the white matter of the cerebral hemispheres bilaterally, unchanged in comparison to previous orbital MRI of 04/08/2022 and brain MRI of 09/29/2021.      MRI scans of the cervical and thoracic spine demonstrate multiple short segment foci of T2 hyperintensity in the cord parenchyma, with the distribution of T2 hyperintense lesions grossly unchanged in comparison to previous cervical MRI of 06/21/2021 and thoracic MRI scan of 06/26/2020.  However, there is a new focus of gadolinium enhancement in the cervical cord at C5-C6 (see series 12, image 9), adjacent to the region of maximal stenosis of the cervical spinal canal from disc osteophyte complex.    Assessment/plan:     1.  Multiple sclerosis  2.  Cervical spinal stenosis  I discussed with the patient that her MRI imaging demonstrates an enhancing focus in the cervical cord.  This was read by the interpreting radiologist as a probable focus of active demyelination, and I agree that this is possible.  However, it is also possible that this is signal abnormality due to compressive myelopathy.      I discussed with the patient that I think it is important to try to distinguish which one of these etiologies is in fact the case, and that the most effective way to do so would be to repeat the cervical MRI imaging in 6 months.  If this is a focus of active demyelination, the contrast enhancement would be expected to have resolved at that time.  However, if the signal abnormality is due to compressive myelopathy is likely that enhancement will persist and if that is the case, we would need to re-evaluate the decision on whether to proceed with surgery.    I discussed with  her that cervical spinal stenosis in the setting of known multiple sclerosis is a somewhat difficult situation.  Realistically, the only way to determine whether decompressing her cervical spine would be of any current or future benefit for her would be to fix the problem, and this would not be a minor surgery.      For now, we will continue with close observation and repeat the imaging in 6 months as discussed above.  She will continue ozanimod for now.  I did review the results of laboratory studies performed earlier on today's date including normal liver function tests and normal complete blood counts with differential, apart from low lymphocyte count, which is an expected finding in a person on a sphingosine analogue. Non-fasting glucose was somewhat elevated at 172 mg/dL, and I advised her to review this with primary care.    3.  Gait disturbance  I strongly recommended a physical therapy evaluation to the patient.  Falls are concerning in and of themselves ,and particularly in light of the fairly tight cervical stenosis, she is at risk of severe injury from falling.  She is agreeable to the physical therapy referral and we will so arrange    4.  Chronic fatigue  She has really not been taking amantadine and is uncertain if this was doing anything for her.  I did tell her that she could stop this medication.  We discussed that regular aerobic exercise has been shown to be of benefit for MS-associated fatigue, and I advised an initial goal of 90 minutes of aerobic exercise weekly.  We will also refer her to sleep medicine for further investigation regarding the possibility of sleep-disordered breathing in light of her report of snoring and non-refreshing sleep.    I spent a total of 52 minutes on patient care activities related to this encounter on the date of service, including time spent reviewing the chart, performing independent review of neuroimaging, obtaining history and examination from and in counseling  the patient.    Daniel Harris MD        D: 05/10/2022   T: 05/10/2022   MT: CONSTANZA    Name:     EVERTON MOONEY  MRN:      [id number]        Account:      [id number]   :      1965           Service Date: 2022       Document: [id number]

## 2022-05-25 ENCOUNTER — OFFICE VISIT (OUTPATIENT)
Dept: FAMILY MEDICINE | Facility: CLINIC | Age: 57
End: 2022-05-25
Payer: COMMERCIAL

## 2022-05-25 VITALS
TEMPERATURE: 98.1 F | WEIGHT: 198.2 LBS | HEIGHT: 65 IN | OXYGEN SATURATION: 98 % | SYSTOLIC BLOOD PRESSURE: 130 MMHG | RESPIRATION RATE: 22 BRPM | BODY MASS INDEX: 33.02 KG/M2 | DIASTOLIC BLOOD PRESSURE: 98 MMHG | HEART RATE: 96 BPM

## 2022-05-25 DIAGNOSIS — Z23 HIGH PRIORITY FOR 2019-NCOV VACCINE: ICD-10-CM

## 2022-05-25 DIAGNOSIS — Z13.1 SCREENING FOR DIABETES MELLITUS: ICD-10-CM

## 2022-05-25 DIAGNOSIS — Z13.220 SCREENING FOR HYPERLIPIDEMIA: ICD-10-CM

## 2022-05-25 DIAGNOSIS — I10 BENIGN ESSENTIAL HYPERTENSION: ICD-10-CM

## 2022-05-25 DIAGNOSIS — R73.09 ELEVATED GLUCOSE: Primary | ICD-10-CM

## 2022-05-25 LAB
CHOLEST SERPL-MCNC: 244 MG/DL
FASTING STATUS PATIENT QL REPORTED: YES
FASTING STATUS PATIENT QL REPORTED: YES
GLUCOSE BLD-MCNC: 88 MG/DL (ref 70–99)
HBA1C MFR BLD: 5.1 % (ref 0–5.6)
HDLC SERPL-MCNC: 47 MG/DL
LDLC SERPL CALC-MCNC: 160 MG/DL
NONHDLC SERPL-MCNC: 197 MG/DL
TRIGL SERPL-MCNC: 187 MG/DL

## 2022-05-25 PROCEDURE — 80061 LIPID PANEL: CPT | Performed by: NURSE PRACTITIONER

## 2022-05-25 PROCEDURE — 99203 OFFICE O/P NEW LOW 30 MIN: CPT | Mod: 25 | Performed by: NURSE PRACTITIONER

## 2022-05-25 PROCEDURE — 83036 HEMOGLOBIN GLYCOSYLATED A1C: CPT | Performed by: NURSE PRACTITIONER

## 2022-05-25 PROCEDURE — 0054A COVID-19,PF,PFIZER (12+ YRS): CPT | Performed by: NURSE PRACTITIONER

## 2022-05-25 PROCEDURE — 82947 ASSAY GLUCOSE BLOOD QUANT: CPT | Performed by: NURSE PRACTITIONER

## 2022-05-25 PROCEDURE — 36415 COLL VENOUS BLD VENIPUNCTURE: CPT | Performed by: NURSE PRACTITIONER

## 2022-05-25 PROCEDURE — 91305 COVID-19,PF,PFIZER (12+ YRS): CPT | Performed by: NURSE PRACTITIONER

## 2022-05-25 ASSESSMENT — PAIN SCALES - GENERAL: PAINLEVEL: NO PAIN (0)

## 2022-05-25 NOTE — PROGRESS NOTES
"  Assessment & Plan     Elevated glucose    - Glucose; Future    Screening for diabetes mellitus    - Hemoglobin A1c  - Glucose; Future    Benign essential hypertension  Recommend starting to monitor home blood pressures with goal /80 or Less  Briefly reviewed lifestyle changes to lower BP and she wants to work on this and start monitoring BP more  - Home Blood Pressure Monitor Order for DME - ONLY FOR DME  - Follow-up in 8 weeks for annual physical and BP recheck    Screening for hyperlipidemia    - Lipid panel reflex to direct LDL Fasting    High priority for 2019-nCoV vaccine    - COVID-19,PF,PFIZER (12+ Yrs GRAY LABEL)    Review of external notes as documented elsewhere in note- reviewed Neurology note from 5/5/22 and lab results from 5/5/22  Ordering of each unique test  I spent a total of 40 minutes on the day of the visit.   Time spent doing chart review, history and exam, documentation and further activities per the note       BMI:   Estimated body mass index is 32.98 kg/m  as calculated from the following:    Height as of this encounter: 1.651 m (5' 5\").    Weight as of this encounter: 89.9 kg (198 lb 3.2 oz).   Weight management plan: Discussed healthy diet and exercise guidelines        Return in about 8 weeks (around 7/20/2022) for Physical Exam.    Margarita Suazo NP  [facility]    Alyssa Jhaveri is a 56 year old who presents for the following health issues     History of Present Illness       Reason for visit:  High glucose levels and need to situate myself with a primary care physisan    She eats 2-3 servings of fruits and vegetables daily.She consumes 0 sweetened beverage(s) daily.She exercises with enough effort to increase her heart rate 9 or less minutes per day.  She exercises with enough effort to increase her heart rate 3 or less days per week.   She is taking medications regularly.     Glucose high at last check with Neurology, 172 was the result in " "May 2022.  This was not a fasting check.  She had a normal glucose level last Fall.    She follows with Dr. Harris, Neurology for multiple sclerosis diagnosed formally in 2019.     to , 3 adult children.  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    Post-partum depression and on Paxil 30 mg that is prescribed by her Psychiatrist.    Vitamin D supplement  Another supplement that her previous neurologist had recommended    Colonoscopy was done at age 53 and was normal with recommendation to repeat in 10 years    Menopause since age 53  No hot flashes      Review of Systems   Constitutional, HEENT, cardiovascular, pulmonary, gi and gu systems are negative, except as otherwise noted.      Objective    BP (!) 130/98   Pulse 96   Temp 98.1  F (36.7  C) (Oral)   Resp 22   Ht 1.651 m (5' 5\")   Wt 89.9 kg (198 lb 3.2 oz)   SpO2 98%   BMI 32.98 kg/m    Body mass index is 32.98 kg/m .  Physical Exam   GENERAL: healthy, alert, no distress and obese  RESP: lungs clear to auscultation - no rales, rhonchi or wheezes  CV: regular rates and rhythm, normal S1 S2, no S3 or S4, no murmur, click or rub and no peripheral edema  PSYCH: mentation appears normal, affect normal/bright, judgement and insight intact and appearance well groomed    Lab on 05/05/2022   Component Date Value Ref Range Status     Sodium 05/05/2022 141  133 - 144 mmol/L Final     Potassium 05/05/2022 4.3  3.4 - 5.3 mmol/L Final     Chloride 05/05/2022 106  94 - 109 mmol/L Final     Carbon Dioxide (CO2) 05/05/2022 28  20 - 32 mmol/L Final     Anion Gap 05/05/2022 7  3 - 14 mmol/L Final     Urea Nitrogen 05/05/2022 9  7 - 30 mg/dL Final     Creatinine 05/05/2022 0.80  0.52 - 1.04 mg/dL Final     Calcium 05/05/2022 9.0  8.5 - 10.1 mg/dL Final     Glucose 05/05/2022 172 (A) 70 - 99 mg/dL Final     Alkaline Phosphatase 05/05/2022 85  40 - 150 U/L Final     AST 05/05/2022 16  0 - 45 U/L Final     ALT 05/05/2022 24  0 - 50 U/L Final "     Protein Total 05/05/2022 6.8  6.8 - 8.8 g/dL Final     Albumin 05/05/2022 3.6  3.4 - 5.0 g/dL Final     Bilirubin Total 05/05/2022 0.4  0.2 - 1.3 mg/dL Final     GFR Estimate 05/05/2022 87  >60 mL/min/1.73m2 Final    Effective December 21, 2021 eGFRcr in adults is calculated using the 2021 CKD-EPI creatinine equation which includes age and gender (Blair et al., NE, DOI: 10.1056/XBCGmh5144159)     FSH 05/05/2022 59.2  IU/L Final    FEMALE:   Age                         0 to 7 days: 3.4 U/L or less   8 to 15 days: 1.0 U/L or less  16 days to 10 years: 0.3-6.9 U/L  11 years: 0.4-9.0 U/L   12 years: 1.0-17.2 U/L   13 years: 1.8-9.9 U/L   14 to 16 years: 0.9-12.4 U/L   17 years: 1.2-9.6 U/L     Premenopausal, 18 and older:   Follicular: 2.5-10.2 U/L  Mid-cycle: 3.4-33.4 U/L  Luteal: 1.5-9.1 U/L  Postmenopausal: 23.0-116.3 U/L    Female Afshin Stages   Stage I: 0.4-6.7 IU/L   Stage II: 0.5-8.7 IU/L   Stage III: 1.2-11.4 IU/L   Stage IV: 0.7-12.8 IU/L   Stage V: 1.0-11.6 IU/L     Puberty onset (transition from Afshin Stage I to Afshin Stage II) occurs for girls at a median age of 10.5 years.   There is evidence that it may occur up to 1 year earlier in obese girls and in  girls.   Progression through Afshin stages is variable. Afshin Stage V (adult) should be reached by age 18.      Lutropin 05/05/2022 30.5  IU/L Final    FEMALE:  Age  1 to 15 days: Not established   16 days to 6 years: 0.3-1.9 IU/L   7 to 8 years: 3.0 U/L or less  9 to 10 years: 4.0 U/L or less  11 years: 6.5 U/L or less  12 years: 0.4-9.9 IU/L   13 years: 0.3-5.4 IU/L  14 years: 0.5-31.2 IU/L  15 years: 0.5-20.7 IU/L  16 years: 0.4-29.4 IU/L  17 years: 1.6-12.4 IU/L     Premenopausal, 18 and older:   Follicular Phase: 1.9-12.5 IU/L  Mid-cycle Peak: 8.7-76.3 IU/L  Luteal Phase: 5-16.9  IU/L  Postmenopausal: 15.9-54.0 IU/L    Female Afshin Stages   Stage I: 2.0 IU/L or less   Stage II: 6.5 IU/L or less   Stage III: 0.3-17.2  IU/L  Stage IV:  0.5-26.3 IU/L   Stage V: 0.6-13.7 IU/L    Puberty onset (transition from Afshin Stage I to Afshin Stage II) occurs for girls at a median age of 10.5 (Æ-Æf2) years. There is evidence that it may occur up to 1 year earlier in obese girls and in  girls. Progression through Afshin stages is variable. Afshin Stage V (adult) should be reached by age 18.       Cortisol 05/05/2022 6.9  4.0 - 22.0 ug/dL Final    6 months and older:  8 AM Cortisol Reference Range:  4-22 ug/dL   4 PM Cortisol Reference Range:  3-17 ug/dL    8 hrs post 1 mg dexamethasone given at midnight: < 5  g/dL     TSH 05/05/2022 1.75  0.40 - 4.00 mU/L Final     WBC Count 05/05/2022 5.4  4.0 - 11.0 10e3/uL Final     RBC Count 05/05/2022 4.97  3.80 - 5.20 10e6/uL Final     Hemoglobin 05/05/2022 15.1  11.7 - 15.7 g/dL Final     Hematocrit 05/05/2022 44.5  35.0 - 47.0 % Final     MCV 05/05/2022 90  78 - 100 fL Final     MCH 05/05/2022 30.4  26.5 - 33.0 pg Final     MCHC 05/05/2022 33.9  31.5 - 36.5 g/dL Final     RDW 05/05/2022 12.6  10.0 - 15.0 % Final     Platelet Count 05/05/2022 258  150 - 450 10e3/uL Final     % Neutrophils 05/05/2022 81  % Final     % Lymphocytes 05/05/2022 10  % Final     % Monocytes 05/05/2022 7  % Final     % Eosinophils 05/05/2022 2  % Final     % Basophils 05/05/2022 0  % Final     % Immature Granulocytes 05/05/2022 0  % Final     NRBCs per 100 WBC 05/05/2022 0  <1 /100 Final     Absolute Neutrophils 05/05/2022 4.4  1.6 - 8.3 10e3/uL Final     Absolute Lymphocytes 05/05/2022 0.5 (A) 0.8 - 5.3 10e3/uL Final     Absolute Monocytes 05/05/2022 0.4  0.0 - 1.3 10e3/uL Final     Absolute Eosinophils 05/05/2022 0.1  0.0 - 0.7 10e3/uL Final     Absolute Basophils 05/05/2022 0.0  0.0 - 0.2 10e3/uL Final     Absolute Immature Granulocytes 05/05/2022 0.0  <=0.4 10e3/uL Final     Absolute NRBCs 05/05/2022 0.0  10e3/uL Final

## 2022-05-25 NOTE — PATIENT INSTRUCTIONS
Goal Blood Pressure 130/80 or Less    Did you know you can lower your blood pressure with your daily habits?     *Losing 20 pounds of weight lowers blood pressure 5 to 20 points.  *Eating a diet rich in fruits, vegetables and low-fat dairy lowers blood pressure 8 to 14 points (DASH diet).  *Eating a low-salt diet lowers blood pressure 2 to 8 points.  *Exercising regularly lowers blood pressure 4 to 9 points.  *Reducing alcohol use lowers blood pressure 2 to 4 points.

## 2022-05-26 PROBLEM — E78.2 MIXED HYPERLIPIDEMIA: Status: ACTIVE | Noted: 2022-05-26

## 2022-06-21 ENCOUNTER — VIRTUAL VISIT (OUTPATIENT)
Dept: URGENT CARE | Facility: CLINIC | Age: 57
End: 2022-06-21
Payer: COMMERCIAL

## 2022-06-21 DIAGNOSIS — U07.1 COVID: Primary | ICD-10-CM

## 2022-06-21 PROCEDURE — 99213 OFFICE O/P EST LOW 20 MIN: CPT | Mod: CS | Performed by: EMERGENCY MEDICINE

## 2022-06-21 NOTE — PROGRESS NOTES
"Video visit:   Start time: 11:58 AM   Stop time: 12:09 PM   Duration: 11 minutes   Patient location: Home   Provider location: [facility] virtual appointment (remote)   Platform used for video visit: enymotion       the patient has been notified of following:     \"This video visit will be conducted via a call between you and your physician/provider. We have found that certain health care needs can be provided without the need for a physical exam.  This service lets us provide the care you need with a short phone conversation.  If a prescription is necessary we can send it directly to your pharmacy.  If lab work is needed we can place an order for that and you can then stop by our lab to have the test done at a later time.  Video visits are billed at different rates depending on your insurance coverage. During this emergency period, for some insurers they may be billed the same as an in-person visit.  Please reach out to your insurance provider with any questions.    If during the course of the call the physician/provider feels a telephone visit is not appropriate, you will not be charged for this service.\"    Patient has given verbal consent for video visit?  Yes    What phone number would you like to be contacted at?  [phone]    How would you like to obtain your AVS? MyChart    Subjective   CC: Shakila Kapoor  is a 56 year old female who presents via phone visit today for the following health issues:   Chief Complaint   Patient presents with     Infection        COVID-19 Symptom Review  How many days ago did these symptoms start? 5    Are any of the following symptoms significant for you?    New or worsening difficulty breathing? No    Worsening cough? Yes, it's a dry cough.     Fever or chills? Yes, the highest temperature was 102    Headache: no    Sore throat: YES    Chest pain: no    Diarrhea: YES    Body aches? no    What treatments has patient tried? Nonsteroidals   Does patient live in a nursing " home, group home, or shelter? no  Does patient have a way to get food/medications during quarantined? Yes, I have a friend or family member who can help me. and Yes                       Reviewed and updated as needed this visit by Provider                    Review of Systems         Objective    Gen: Patient is alert, oriented  Gen: No acute distress on video visit.  Not dyspneic appearing.              Assessment/Plan:  Patient is a 56-year-old female with a history of multiple sclerosis whose had COVID infection for 5 days.  T-max of 102 now better.  Symptoms are otherwise typical.  No shortness of breath.     Risk: History of MS, hypertension.  Elevated BMI of 33.  GFR: 87  Patient consents to taking paxlovid.  No medication interactions of concern.      John Jarrett MD

## 2022-07-01 ENCOUNTER — THERAPY VISIT (OUTPATIENT)
Dept: PHYSICAL THERAPY | Facility: CLINIC | Age: 57
End: 2022-07-01
Attending: PSYCHIATRY & NEUROLOGY
Payer: COMMERCIAL

## 2022-07-01 DIAGNOSIS — M48.02 CERVICAL STENOSIS OF SPINAL CANAL: ICD-10-CM

## 2022-07-01 DIAGNOSIS — R26.9 GAIT DISTURBANCE: ICD-10-CM

## 2022-07-01 DIAGNOSIS — G35 MS (MULTIPLE SCLEROSIS) (H): ICD-10-CM

## 2022-07-01 PROCEDURE — 97162 PT EVAL MOD COMPLEX 30 MIN: CPT | Mod: GP | Performed by: PHYSICAL THERAPIST

## 2022-07-01 PROCEDURE — 97110 THERAPEUTIC EXERCISES: CPT | Mod: GP | Performed by: PHYSICAL THERAPIST

## 2022-07-01 NOTE — PROGRESS NOTES
07/01/22 0700   Quick Adds   Type of Visit Initial OP PT Evaluation   General Information   Start of Care Date 07/01/22   Referring Physician Daniel Harris MD   Orders Evaluate and Treat as Indicated   Order Date 05/05/22   Medical Diagnosis MS   Onset of illness/injury or Date of Surgery 05/05/22   Precautions/Limitations no known precautions/limitations   Surgical/Medical history reviewed Yes   Pertinent history of current problem (include personal factors and/or comorbidities that impact the POC) Patient diagnosed with MS at 21 years old, but had no change in symptoms until 2019.  MRI in 2021 demonstrated increased lesions; changed medication to ozanimod.  Past medical history: Cervical stenosis, chronic fatigue, anxiety. Patient reports walking difficulties including gait mechanics and decreased speed. Patient reports occasional low back pain- transitions with sit to stands, prolonged sitting. Patient does report increased activity will make her back feel better. Patient reports imbalance including catching right foot on the ground, turns and uneven surfaces with a few falls reported   Pertinent Visual History  Blind out of left eye (recent). Wears glasses for reading   Prior level of function comment Independent-working as a    Diagnostic Tests MRI   MRI Results Results   MRI results Increased lesions in 2021, stable in 2022   Patient role/Employment history Employed  (special )   [facility]   Home/Community Accessibility Comments Driving   Assistive Devices Comments None   Patient/Family Goals Statement To improve walking and balance   Fall Risk Screen   Fall screen completed by PT   Have you fallen 2 or more times in the past year? Yes   Have you fallen and had an injury in the past year? Yes   Is patient a fall risk? Yes   Fall screen comments Gait speed less than 1.0 m/s; increased risk of falls   Pain   Patient currently in pain Yes   Pain  comments Low back-SI area (right more than left)   Vitals Signs   Heart Rate 78   SpO2 96   Blood Pressure 156/73   Vital Signs Comments patient reports dizziness during session   Cognitive Status Examination   Orientation orientation to person, place and time   Level of Consciousness alert   Follows Commands and Answers Questions 100% of the time;able to follow multistep instructions   Personal Safety and Judgment intact   Memory intact   Posture   Posture Forward head position;Protracted shoulders;Other   Posture Comments Right trunk shift   Range of Motion (ROM)   ROM Comment R ankle DF lacking ~10 degrees, left ankle dorsiflexion neutral.  Moderate tissue restrictions in bilateral piriformis, hamstrings and mild tissue restrictions in bilateral hip flexors   Strength   Strength Comments R hip 3+/5, all others grossly 4-5/5   Bed Mobility   Bed Mobility Comments Independent   Transfer Skills   Transfer Comments Independent   Gait   Gait Comments Patient ambulates without a device modified independent-limited R arm swing, R trunk lean throughout gait, limited heel strike and pushoff bilaterally, rigid pelvis. Gait speed: 0.9 m/s   Gait Special Tests   Gait Special Tests OTHER   Balance Special Tests   Balance Special Tests Sit to stand reps   Balance Special Tests Sit to Stand Reps in 30 Seconds   Comments 5 times sit to stand test: 12.41 seconds   Sensory Examination   Sensory Perception Comments has some numbness/tingling in bottoms of feet- not daily   Coordination   Coordination no deficits were identified   Muscle Tone   Muscle Tone Comments MAS 0   Planned Therapy Interventions   Planned Therapy Interventions balance training;gait training;neuromuscular re-education;ROM;strengthening;stretching;transfer training;manual therapy   Clinical Impression   Criteria for Skilled Therapeutic Interventions Met yes, treatment indicated   PT Diagnosis Impaired function, imbalance   Influenced by the following  impairments Balance, range of motion, strength, vision, posture, pain   Functional limitations due to impairments Decreased participation in daily activities-ADLs, IADLs.  Increased fall risk.  Impaired gait-household mobility.   Clinical Presentation Evolving/Changing   Clinical Presentation Rationale Personal factors, body systems involved   Clinical Decision Making (Complexity) Moderate complexity   Therapy Frequency 1 time/week   Predicted Duration of Therapy Intervention (days/wks) 4-6 weeks   Risk & Benefits of therapy have been explained Yes   Patient, Family & other staff in agreement with plan of care Yes   Clinical Impression Comments Patient is a 56-year-old female who presents to outpatient physical therapy with overall decline in function most notably with walking and balance.  On exam patient demonstrates above listed impairments which is impacting overall participation in daily activity and places her at increased risk for falls.  Patient also has ongoing low back discomfort which is most likely of results of core instability and tightness in pelvic musculature.  Patient would benefit from skilled PT services for functional mobility upgrading and balance retraining in order to improve activity participation.  Patient also reported some dizziness during evaluation, after activity and frequent position changes; may impact overall plan of care, but will perform ongoing balance assessment next session   Education Assessment   Preferred Learning Style Demonstration;Pictures/video   Barriers to Learning No barriers   GOALS   PT Eval Goals 1;2;3;4   Goal 1   Goal Identifier Gait speed   Goal Description Patient will ambulate at least 1.2 m/s in order to safely cross the street and to decrease fall risk for community ambulation   Target Date 09/28/22   Goal 2   Goal Identifier FGA   Goal Description patient will participate in the functional gait assessment and score at least 23/30 to decrease fall risk    Target Date 09/28/22   Goal 3   Goal Identifier 5 times sit to stand test   Goal Description Patient will perform the 5 times sit to stand test, pain free, in less than 12 seconds to decrease fall risk and improve stability   Target Date 09/28/22   Goal 4   Goal Identifier HEP   Goal Description Patient will be independent home exercise program for maintenance of therapy gains at discharge   Target Date 09/28/22   Total Evaluation Time   PT Eval, Moderate Complexity Minutes (32022) 30     Gabriela Brooks PT, DPT  Physical Therapist  [facility] [address]  [address] [place] [address]  [email or web address]  Appointments: [phone]

## 2022-07-08 ENCOUNTER — THERAPY VISIT (OUTPATIENT)
Dept: PHYSICAL THERAPY | Facility: CLINIC | Age: 57
End: 2022-07-08
Payer: COMMERCIAL

## 2022-07-08 DIAGNOSIS — G35 MS (MULTIPLE SCLEROSIS) (H): Primary | ICD-10-CM

## 2022-07-08 PROCEDURE — 97750 PHYSICAL PERFORMANCE TEST: CPT | Mod: GP | Performed by: PHYSICAL THERAPIST

## 2022-07-08 PROCEDURE — 97110 THERAPEUTIC EXERCISES: CPT | Mod: GP | Performed by: PHYSICAL THERAPIST

## 2022-08-19 ENCOUNTER — THERAPY VISIT (OUTPATIENT)
Dept: PHYSICAL THERAPY | Facility: CLINIC | Age: 57
End: 2022-08-19
Payer: COMMERCIAL

## 2022-08-19 DIAGNOSIS — G35 MS (MULTIPLE SCLEROSIS) (H): Primary | ICD-10-CM

## 2022-08-19 PROCEDURE — 97112 NEUROMUSCULAR REEDUCATION: CPT | Mod: GP | Performed by: PHYSICAL THERAPIST

## 2022-08-19 NOTE — DISCHARGE INSTRUCTIONS
Continue core stabilization exercises, but try and engage when standing and walking as well  Continue walking with dog around the block when the weather is nice. Plan B option when weather isn't nice to keep cardio 20 minutes/day    Gabriela Brooks PT, DPT  Physical Therapist  [facility] [address]  [address] [place] [address]  [email or web address]  Appointments: [phone]

## 2022-09-06 ENCOUNTER — TELEPHONE (OUTPATIENT)
Dept: NEUROLOGY | Facility: CLINIC | Age: 57
End: 2022-09-06

## 2022-09-06 NOTE — TELEPHONE ENCOUNTER
PA Initiation    Medication: Zeposia 0.92MG Capsules (PA PENDING)  Insurance Company: [employer] - Phone [phone] Fax [phone]  Pharmacy Filling the Rx: [facility] - [place], [place] - [address]  Filling Pharmacy Phone:    Filling Pharmacy Fax:    Start Date: 9/6/2022    [facility] KEY: [id number]        Thank you,    Jasmyn Garcia [facility]  Specialty Pharmacy Clinic Liaison - CardiologyNeurologyMultiple [facility] Surgery [address]  [address] [place] [address]  Ph: [phone] Fax: [phone]  [email or web address]

## 2022-09-12 NOTE — TELEPHONE ENCOUNTER
Prior Authorization Approval    Authorization Effective Date: 9/9/2022  Authorization Expiration Date: 9/9/2023  Medication: Zeposia 0.92MG Capsules ([place] APPROVED)  Approved Dose/Quantity: 30 DAYS  Reference #:     Insurance Company: [employer] - Phone [phone] Fax [phone]  Expected CoPay:       CoPay Card Available:      Foundation Assistance Needed:    Which Pharmacy is filling the prescription (Not needed for infusion/clinic administered): [facility] [place] [place] [facility] [address] [facility]  Pharmacy Notified:    Patient Notified:            Thank you,    Jasmyn Garcia h-T  Specialty Pharmacy Clinic Liaison - CardiologyNeurologyMultiple Sclerosis  [facility]  [address] [place] [address]  [address] [place] [address]  Ph: [phone] Fax: [phone]  [email or web address]

## 2022-09-21 DIAGNOSIS — G35 MULTIPLE SCLEROSIS (H): Primary | ICD-10-CM

## 2022-09-21 RX ORDER — OZANIMOD HYDROCHLORIDE 0.23-0.46
KIT ORAL
Status: CANCELLED | OUTPATIENT
Start: 2022-09-21

## 2022-09-22 NOTE — TELEPHONE ENCOUNTER
Called [facility] and confirmed that pt is on the maintenance dose of Zeposia, not the starter kit listed in this encounter. [facility] requests rx renewal of Zeposia 0.92 mg daily, originally prescribed by Dr Sharma who is no longer with [facility]. Pt has established care with Dr Harris. Pt was last seen in May and has follow-up appointment in November with Dr Harris. Rx request for Zeposia 0.92 mg routed to Dr Harris for signature.     Flower Barnett RN  [facility]

## 2022-09-22 NOTE — TELEPHONE ENCOUNTER
Ophthalmology/Dr. Soto would not be prescribing    Please let me know if need any assistance going forth with refill request    Luis Valdivia RN 9:44 AM 09/22/22

## 2022-09-22 NOTE — TELEPHONE ENCOUNTER
ZEPOSIA STARTER KIT 0.23MG & 0.46MG & 0.92MG CPPK  Last Written Prescription Date:  ?  Last Fill Quantity: ?,   # refills: ?  Last Office Visit :  5/5/2022  Future Office visit:  None    Routing refill request to provider for review/approval because:  Medication is reported/historical  This was reported under Ophthalmology  But will Neurology or Ophthalmology?  Refer to both clinics to review and decide who will take over the management of this med.     Marcy Grove RN  Central Triage Red Flags/Med Refills

## 2022-09-23 RX ORDER — OZANIMOD HYDROCHLORIDE 0.23-0.46
KIT ORAL
OUTPATIENT
Start: 2022-09-23

## 2022-09-23 RX ORDER — OZANIMOD HYDROCHLORIDE 0.92 MG/1
0.92 CAPSULE ORAL DAILY
Qty: 30 CAPSULE | Refills: 11 | Status: SHIPPED | OUTPATIENT
Start: 2022-09-23 | End: 2023-08-15

## 2022-09-23 NOTE — TELEPHONE ENCOUNTER
Rx for maintenance dose of Zeposia has already been routed to Dr Harris for signature. Pt currently on Zeposia and starter kit is not needed. The pharmacy is requesting the maintenance dose.     Flower Barnett RN

## 2022-09-23 NOTE — TELEPHONE ENCOUNTER
ZEPOSIA STARTER KIT 0.23MG & 0.46MG & 0.92MG CPPK  Last Written Prescription Date:  ?  Last Fill Quantity: ?,   # refills: ?  Last Office Visit :  5/5/2022  Future Office visit:  None    Routing refill request to provider for review/approval because:  Reported as historical      Marcy Grove RN  Central Triage Red Flags/Med Refills

## 2022-09-28 ENCOUNTER — OFFICE VISIT (OUTPATIENT)
Dept: OPHTHALMOLOGY | Facility: CLINIC | Age: 57
End: 2022-09-28
Payer: COMMERCIAL

## 2022-09-28 DIAGNOSIS — H53.40 VISUAL FIELD DEFECT: Primary | ICD-10-CM

## 2022-09-28 DIAGNOSIS — H47.20 OPTIC ATROPHY, RIGHT EYE: Primary | ICD-10-CM

## 2022-09-28 DIAGNOSIS — H53.40 VISUAL FIELD DEFECT: ICD-10-CM

## 2022-09-28 PROCEDURE — 92014 COMPRE OPH EXAM EST PT 1/>: CPT | Mod: GC | Performed by: OPHTHALMOLOGY

## 2022-09-28 PROCEDURE — 92083 EXTENDED VISUAL FIELD XM: CPT | Mod: GC | Performed by: OPHTHALMOLOGY

## 2022-09-28 PROCEDURE — 92133 CPTRZD OPH DX IMG PST SGM ON: CPT | Mod: GC | Performed by: OPHTHALMOLOGY

## 2022-09-28 ASSESSMENT — SLIT LAMP EXAM - LIDS
COMMENTS: NORMAL
COMMENTS: NORMAL

## 2022-09-28 ASSESSMENT — CONF VISUAL FIELD
OD_INFERIOR_TEMPORAL_RESTRICTION: 1
OD_SUPERIOR_TEMPORAL_RESTRICTION: 1
OD_INFERIOR_NASAL_RESTRICTION: 1
OD_SUPERIOR_NASAL_RESTRICTION: 1

## 2022-09-28 ASSESSMENT — TONOMETRY
OD_IOP_MMHG: 15
OS_IOP_MMHG: 16
IOP_METHOD: ICARE

## 2022-09-28 ASSESSMENT — VISUAL ACUITY
METHOD: SNELLEN - LINEAR
OS_SC: 20/20
OD_SC: CF
OS_SC+: -1

## 2022-09-28 ASSESSMENT — CUP TO DISC RATIO
OD_RATIO: 0.2
OS_RATIO: 0.2

## 2022-09-28 ASSESSMENT — EXTERNAL EXAM - RIGHT EYE: OD_EXAM: NORMAL

## 2022-09-28 ASSESSMENT — EXTERNAL EXAM - LEFT EYE: OS_EXAM: NORMAL

## 2022-09-28 NOTE — PROGRESS NOTES
Assessment & Plan     Shakila Kapoor is a 57 year old female with the following diagnoses:   1. Optic atrophy, right eye    2. 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  Past medical history of relapsing remitting multiple sclerosis.  She had lost vision in the spring and felt possibly due to meningioma likely compressed the optic nerve in the canal.       MRI performed same day and showed slightly increased enhancement of the right optic nerve sheath meningoma and new T2 hyperintense lesion of the right anterior temporal lobe.  She was started on prednisone 1250 mg for 3 days to reduce inflammation seen on the scan.   At the time of last visit her vision had worsened following radiation therapy.  She has now undergone two subsequent MRIs, the most recent of which was performed 5/5/22 and showed no significant interval change.    Over the past year she denies any vision changes. No pain or discomfort in the eyes, including with EOMs.    Uncorrected distance visual acuity was LP and P in the right eye and 20/20 -1 in the left eye. Intraocular pressure was 15 in the right eye and 16 in the left eye using ICare.  Color vision 0/11 right eye and 11/11 left eye.  Pupils redemonstrate RAPD OD.  Anterior segment exam unremarkable.  Fundus exam with interval pallor of the right optic nerve head, stable/unremarkable OS.     OCT RNFL demonstrates interval diffuse thinning OD, likely sequela of known optic neuropathy, with stable/full thickness OS (average 86 from 87). OVF OS with few nonspecific depressions, high FP>FN rate.    Is my impression that patient has optic atrophy of the right eye, consistent with meningioma and subsequent optic neuropathy related to radiation therapy, there has been expected interval development of pallor and RNFL thinning on that side.  Fortunately her exam remains  unremarkable on the left with essentially full visual field and full/intact RNFL thickness.  There are no stigmata of optic neuritis, which bears mention given her history of multiple sclerosis.  Of note, her vision has improved slightly in that right eye.  Follow up in 1 year or sooner as needed for worsening symptoms.     We discussed monocular precautions with recommendation for full-time polycarbonate glasses while awake and full wrap-around safety glasses when working with machinery or in scar/dirty environments.              Attending Physician Attestation:  Complete documentation of historical and exam elements from today's encounter can be found in the full encounter summary report (not reduplicated in this progress note).  I personally obtained the chief complaint(s) and history of present illness.  I confirmed and edited as necessary the review of systems, past medical/surgical history, family history, social history, and examination findings as documented by others; and I examined the patient myself.  I personally reviewed the relevant tests, images, and reports as documented above.  I formulated and edited as necessary the assessment and plan and discussed the findings and management plan with the patient and family. I personally reviewed the ophthalmic test(s) associated with this encounter, agree with the interpretation(s) as documented by the resident/fellow, and have edited the corresponding report(s) as necessary.  - Samuel Carrera MD PhD  Fellow, Neuro-Ophthalmology

## 2022-09-28 NOTE — NURSING NOTE
Chief Complaints and History of Present Illnesses   Patient presents with     Yearly Exam     Primary meningioma of ON sheath     Chief Complaint(s) and History of Present Illness(es)     Yearly Exam     Associated symptoms: Negative for double vision, eye pain, flashes and floaters    Treatments tried: no treatments    Pain scale: 0/10    Comments: Primary meningioma of ON sheath              Comments     Pt notes no VA changes that she is able to notice, denies HA or eye pain.     Evette GEORGE September 28, 2022 8:14 AM

## 2022-10-30 ENCOUNTER — HEALTH MAINTENANCE LETTER (OUTPATIENT)
Age: 57
End: 2022-10-30

## 2022-11-10 ENCOUNTER — LAB (OUTPATIENT)
Dept: LAB | Facility: CLINIC | Age: 57
End: 2022-11-10
Payer: COMMERCIAL

## 2022-11-10 ENCOUNTER — OFFICE VISIT (OUTPATIENT)
Dept: NEUROLOGY | Facility: CLINIC | Age: 57
End: 2022-11-10
Attending: PSYCHIATRY & NEUROLOGY
Payer: COMMERCIAL

## 2022-11-10 ENCOUNTER — ANCILLARY PROCEDURE (OUTPATIENT)
Dept: MRI IMAGING | Facility: CLINIC | Age: 57
End: 2022-11-10
Attending: PSYCHIATRY & NEUROLOGY
Payer: COMMERCIAL

## 2022-11-10 VITALS
HEIGHT: 65 IN | HEART RATE: 105 BPM | SYSTOLIC BLOOD PRESSURE: 159 MMHG | OXYGEN SATURATION: 98 % | BODY MASS INDEX: 32.49 KG/M2 | WEIGHT: 195 LBS | DIASTOLIC BLOOD PRESSURE: 82 MMHG

## 2022-11-10 DIAGNOSIS — R39.15 URINARY URGENCY: ICD-10-CM

## 2022-11-10 DIAGNOSIS — G35 MS (MULTIPLE SCLEROSIS) (H): Primary | ICD-10-CM

## 2022-11-10 DIAGNOSIS — M48.02 CERVICAL STENOSIS OF SPINAL CANAL: ICD-10-CM

## 2022-11-10 DIAGNOSIS — R26.9 GAIT DISTURBANCE: ICD-10-CM

## 2022-11-10 DIAGNOSIS — G35 MS (MULTIPLE SCLEROSIS) (H): ICD-10-CM

## 2022-11-10 LAB
ALBUMIN SERPL BCG-MCNC: 4.3 G/DL (ref 3.5–5.2)
ALP SERPL-CCNC: 77 U/L (ref 35–104)
ALT SERPL W P-5'-P-CCNC: 13 U/L (ref 10–35)
AST SERPL W P-5'-P-CCNC: 18 U/L (ref 10–35)
BASOPHILS # BLD AUTO: 0 10E3/UL (ref 0–0.2)
BASOPHILS NFR BLD AUTO: 0 %
BILIRUB DIRECT SERPL-MCNC: <0.2 MG/DL (ref 0–0.3)
BILIRUB SERPL-MCNC: 0.4 MG/DL
EOSINOPHIL # BLD AUTO: 0.1 10E3/UL (ref 0–0.7)
EOSINOPHIL NFR BLD AUTO: 1 %
ERYTHROCYTE [DISTWIDTH] IN BLOOD BY AUTOMATED COUNT: 12.8 % (ref 10–15)
HCT VFR BLD AUTO: 45.7 % (ref 35–47)
HGB BLD-MCNC: 15.4 G/DL (ref 11.7–15.7)
IMM GRANULOCYTES # BLD: 0 10E3/UL
IMM GRANULOCYTES NFR BLD: 0 %
LYMPHOCYTES # BLD AUTO: 0.3 10E3/UL (ref 0.8–5.3)
LYMPHOCYTES NFR BLD AUTO: 4 %
MCH RBC QN AUTO: 30.4 PG (ref 26.5–33)
MCHC RBC AUTO-ENTMCNC: 33.7 G/DL (ref 31.5–36.5)
MCV RBC AUTO: 90 FL (ref 78–100)
MONOCYTES # BLD AUTO: 0.4 10E3/UL (ref 0–1.3)
MONOCYTES NFR BLD AUTO: 6 %
NEUTROPHILS # BLD AUTO: 6 10E3/UL (ref 1.6–8.3)
NEUTROPHILS NFR BLD AUTO: 89 %
NRBC # BLD AUTO: 0 10E3/UL
NRBC BLD AUTO-RTO: 0 /100
PLATELET # BLD AUTO: 284 10E3/UL (ref 150–450)
PROT SERPL-MCNC: 6.9 G/DL (ref 6.4–8.3)
RBC # BLD AUTO: 5.06 10E6/UL (ref 3.8–5.2)
WBC # BLD AUTO: 6.8 10E3/UL (ref 4–11)

## 2022-11-10 PROCEDURE — 82306 VITAMIN D 25 HYDROXY: CPT | Mod: 90 | Performed by: PATHOLOGY

## 2022-11-10 PROCEDURE — G0463 HOSPITAL OUTPT CLINIC VISIT: HCPCS

## 2022-11-10 PROCEDURE — A9585 GADOBUTROL INJECTION: HCPCS | Performed by: RADIOLOGY

## 2022-11-10 PROCEDURE — 99000 SPECIMEN HANDLING OFFICE-LAB: CPT | Performed by: PATHOLOGY

## 2022-11-10 PROCEDURE — 99214 OFFICE O/P EST MOD 30 MIN: CPT | Mod: GC | Performed by: PSYCHIATRY & NEUROLOGY

## 2022-11-10 PROCEDURE — 80076 HEPATIC FUNCTION PANEL: CPT | Performed by: PATHOLOGY

## 2022-11-10 PROCEDURE — 72156 MRI NECK SPINE W/O & W/DYE: CPT | Mod: GC | Performed by: RADIOLOGY

## 2022-11-10 PROCEDURE — 36415 COLL VENOUS BLD VENIPUNCTURE: CPT | Performed by: PATHOLOGY

## 2022-11-10 PROCEDURE — 85025 COMPLETE CBC W/AUTO DIFF WBC: CPT | Performed by: PATHOLOGY

## 2022-11-10 RX ORDER — GADOBUTROL 604.72 MG/ML
10 INJECTION INTRAVENOUS ONCE
Status: COMPLETED | OUTPATIENT
Start: 2022-11-10 | End: 2022-11-10

## 2022-11-10 RX ADMIN — GADOBUTROL 9 ML: 604.72 INJECTION INTRAVENOUS at 13:05

## 2022-11-10 ASSESSMENT — PAIN SCALES - GENERAL: PAINLEVEL: NO PAIN (0)

## 2022-11-10 NOTE — Clinical Note
11/10/2022       RE: Shakila Kapoor  [address] [place] [address]     Dear Colleague,    Thank you for referring your patient, Shakila Kapoor, to the [facility] [place] at [facility]. Please see a copy of my visit note below.    Multiple Sclerosis Clinic Visit  11/10/2022    Reason: Multiple Sclerosis      Source of information: Patient and chart review    History of Present Symptom:  Shakila Kapoor is a 57 year old female with a PMH significant for multiple sclerosis who presents today for follow up.     She reports physical therapy was really helpful for balance and muscle tightness. She reports ongoing balance difficulty and coordination with gait. She reports no new weakness/numbnses. She reports no vision changes or double vision.      No concerns with ozanimod. Taking consistently.     She met with neurophthalmology and her vision remains stable. She has significant vision loss in left eye.      Disease Coruse:   At age 20 left side sensory symptoms and probable optic neuritis. She was initially thought to have probable/possible MS. She did not have any neurologic changes until 2019 when she developed progressive vision loss with right eye. 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 She then did develop demyelinating disease on MRI and was started on Rebif.    Previous disease modifying therapy:   Rebif (ragiologic disease progression)  2021 ozanimod     Symptom management:  Fatigue: Low energy and tiredness continues to be a problem.  -sleep med visit 11/15/22   Bowel/bladder changes: Oxybutynin is working really well for urinary symptoms.       The patient's medical, surgical, social, and family history were personally reviewed with the patient.  Past Medical History:   Diagnosis Date     Benign essential hypertension 5/25/2022     Meningioma (H)       Mixed hyperlipidemia 5/26/2022     Multiple sclerosis (H)       Past Surgical History:   Procedure Laterality Date     NO HISTORY OF SURGERY       Social History     Tobacco Use     Smoking status: Never     Smokeless tobacco: Never   Substance Use Topics     Alcohol use: Yes     Comment: rarely     Drug use: Never     Family History   Problem Relation Age of Onset     Bipolar Disorder Mother      Hypertension Father      Lung Cancer Maternal Grandmother      Lung Cancer Maternal Grandfather      Lung Cancer Paternal Grandmother      Hirschsprung's Disease Son      Anxiety Disorder Daughter      Glaucoma No family hx of      Macular Degeneration No family hx of      Current Outpatient Medications   Medication     oxybutynin ER (DITROPAN XL) 10 MG 24 hr tablet     Ozanimod HCl (ZEPOSIA) 0.92 MG CAPS     PARoxetine (PAXIL) 30 MG tablet     White Petrolatum-Mineral Oil (REFRESH P.M.) OINT     ZEPOSIA STARTER KIT 0.23MG & 0.46MG & 0.92MG CPPK     No current facility-administered medications for this visit.     Allergies   Allergen Reactions     Penicillins      Other reaction(s): *Unknown         Review of Systems:  14-point review of systems was completed. The pertinent positives and negatives are in the HPI.    Physical Examination   General: Patient appears comfortable in no acute distress.   HEENT: NC/AT, no icterus, moist mucous membranes  Chest: non-labored on RA  Extremities: Warm, no edema  Skin: No rash or lesion   Psych: Affect appropriate for situation   Neuro:  Mental status: Awake, alert, attentive. Language is fluent with intact comprehension of commands.  Cranial nerves:  conjugate gaze, EOMI, visual fields intact, face symmetric, shoulder shrug strong, tongue protrusion/uvula midline, no dysarthria.   Motor: Normal muscle bulk and tone.     R L  Deltoid  5 5  Biceps  5 5  Triceps 5 5  Wrist ext 5 5  Finger ext 5 5  Finger abd 5 5    Hip flexion 5 5  Knee flexion 5 5  Knee  ext 5 5  Dorsiflexion 5 5    Reflexes: 2+ reflexes symmetric in biceps, brachioradialis, brisk on the right patellae, and achilles. No clonus, toes down-going.  Sensory: Reduced vibration in the right foot (severe). Romberg is negative.   Coordination: FNF and HS without ataxia or dysmetria.    Gait: Normal width, stride length, turn, with symmetric arm swing. Tandem walk is moderately difficult.     Laboratory:  Vit D 37     CBC RESULTS: Recent Labs   Lab Test 11/10/22  1459   WBC 6.8   RBC 5.06   HGB 15.4   HCT 45.7   MCV 90   MCH 30.4   MCHC 33.7   RDW 12.8        AST/ALT 16/24     Imaging:    MRI c-spine 11/10/2022  Extensive T2 hyperintensity patchy in multiple areas in the cervical cord.Sternosi at C5-C6 with corresponding subtle contrast enhancement persistent since last MRI 6 months prior.     Assessment/Plan:  Shakila Kapoor is a 57 year old female who presents for follow up for multiple sclerosis and cervical stenosis. We reviewed her repeat C-spine MRI which does show ongoing contrast enhancement. This makes this more likely associated with cervical stenosis as a demyelinating lesion would likely have had resolution of contrast enhancement by this time.     We discussed the implication of this that there is active/worsening compression. This could cause worsening gait/balance sensory changes or even weakness over time. We discussed that surgery can sometimes be necessary to stop symptoms from worsening. She is interested in a second opinion from neurosurgery which is reasonable.     Her exam is significant for brisk reflexes on the right leg and vibratory loss on the right leg. Difficulty with tandem gait.     # multiple sclerosis   # urinary urgency   # gait instability   # cervical stenosis     -continue zeposia    -continue oxybutynin   -blood tests   -return 6 months   -referral for neurosurgery re cervical stenosis     Patient seen and discussed with Dr. Harris.  I have reviewed the  plan with the patient, who is in agreement.      Bee Mercedes DO  Multiple Sclerosis Fellow     Attending physician: I saw and evaluated the patient with Dr. Mercedes and I agree with her findings and plan of care as documented above.    I personally reviewed the results of MRI imaging of the cervical spine performed earlier on today's date, and the following is my independent interpretation of those images. To my eye, there is subtle contrast enhancement at C5-6 contiguous to maximal cervical spine stenosis. I think that the findings are consistent with radiologically significant compressive myelopathy rather than active demyelinating disease.     Although the patient's clinical examination is stable, I discussed with her that there is some risk of further deterioration of function associated with the cervical compression, as well as a risk of acute cord injury. I would be inclined to consider a surgical intervention here, and we will refer her to neurosurgery for a second opinion in this regard.     Regarding her MS diagnosis, we will obtain routine laboratory studies for monitoring of ozanimod to include complete blood counts and hepatic panel. I will also check a vitamin D level and advise her on supplementation with vitamin D as needed to maintain her level within the goal range of 60-80 mcg/L.    I will see her back in 6 months with an MRI scan of the brain to be performed prior to that visit to follow up on the radiologic stability of her condition.    Remainder as above.    Daniel Harris MD   of Neurology  [facility]    Diagnoses:    1) Multiple sclerosis  2) Cervical stenosis  3) Gait disturbance  4) Urinary urgency            Again, thank you for allowing me to participate in the care of your patient.      Sincerely,    Daniel Harris MD

## 2022-11-10 NOTE — PROGRESS NOTES
Multiple Sclerosis Clinic Visit  11/10/2022    Reason: Multiple Sclerosis      Source of information: Patient and chart review    History of Present Symptom:  Shakila Kapoor is a 57 year old female with a PMH significant for multiple sclerosis who presents today for follow up.     She reports physical therapy was really helpful for balance and muscle tightness. She reports ongoing balance difficulty and coordination with gait. She reports no new weakness/numbnses. She reports no vision changes or double vision.      No concerns with ozanimod. Taking consistently.     She met with neurophthalmology and her vision remains stable. She has significant vision loss in left eye.      Disease Coruse:   At age 20 left side sensory symptoms and probable optic neuritis. She was initially thought to have probable/possible MS. She did not have any neurologic changes until 2019 when she developed progressive vision loss with right eye. 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 She then did develop demyelinating disease on MRI and was started on Rebif.    Previous disease modifying therapy:   Rebif (ragiologic disease progression)  2021 ozanimod     Symptom management:  Fatigue: Low energy and tiredness continues to be a problem.  -sleep med visit 11/15/22   Bowel/bladder changes: Oxybutynin is working really well for urinary symptoms.       The patient's medical, surgical, social, and family history were personally reviewed with the patient.  Past Medical History:   Diagnosis Date     Benign essential hypertension 5/25/2022     Meningioma (H)      Mixed hyperlipidemia 5/26/2022     Multiple sclerosis (H)       Past Surgical History:   Procedure Laterality Date     NO HISTORY OF SURGERY       Social History     Tobacco Use     Smoking status: Never     Smokeless tobacco: Never   Substance Use Topics     Alcohol use: Yes     Comment: rarely     Drug use: Never     Family  History   Problem Relation Age of Onset     Bipolar Disorder Mother      Hypertension Father      Lung Cancer Maternal Grandmother      Lung Cancer Maternal Grandfather      Lung Cancer Paternal Grandmother      Hirschsprung's Disease Son      Anxiety Disorder Daughter      Glaucoma No family hx of      Macular Degeneration No family hx of      Current Outpatient Medications   Medication     oxybutynin ER (DITROPAN XL) 10 MG 24 hr tablet     Ozanimod HCl (ZEPOSIA) 0.92 MG CAPS     PARoxetine (PAXIL) 30 MG tablet     White Petrolatum-Mineral Oil (REFRESH P.M.) OINT     ZEPOSIA STARTER KIT 0.23MG & 0.46MG & 0.92MG CPPK     No current facility-administered medications for this visit.     Allergies   Allergen Reactions     Penicillins      Other reaction(s): *Unknown         Review of Systems:  14-point review of systems was completed. The pertinent positives and negatives are in the HPI.    Physical Examination   General: Patient appears comfortable in no acute distress.   HEENT: NC/AT, no icterus, moist mucous membranes  Chest: non-labored on RA  Extremities: Warm, no edema  Skin: No rash or lesion   Psych: Affect appropriate for situation   Neuro:  Mental status: Awake, alert, attentive. Language is fluent with intact comprehension of commands.  Cranial nerves:  conjugate gaze, EOMI, visual fields intact, face symmetric, shoulder shrug strong, tongue protrusion/uvula midline, no dysarthria.   Motor: Normal muscle bulk and tone.     R L  Deltoid  5 5  Biceps  5 5  Triceps 5 5  Wrist ext 5 5  Finger ext 5 5  Finger abd 5 5    Hip flexion 5 5  Knee flexion 5 5  Knee ext 5 5  Dorsiflexion 5 5    Reflexes: 2+ reflexes symmetric in biceps, brachioradialis, brisk on the right patellae, and achilles. No clonus, toes down-going.  Sensory: Reduced vibration in the right foot (severe). Romberg is negative.   Coordination: FNF and HS without ataxia or dysmetria.    Gait: Normal width, stride length, turn, with symmetric arm  swing. Tandem walk is moderately difficult.     Laboratory:  Vit D 37     CBC RESULTS: Recent Labs   Lab Test 11/10/22  1459   WBC 6.8   RBC 5.06   HGB 15.4   HCT 45.7   MCV 90   MCH 30.4   MCHC 33.7   RDW 12.8        AST/ALT 16/24     Imaging:    MRI c-spine 11/10/2022  Extensive T2 hyperintensity patchy in multiple areas in the cervical cord.Sternosi at C5-C6 with corresponding subtle contrast enhancement persistent since last MRI 6 months prior.     Assessment/Plan:  Shakila Kapoor is a 57 year old female who presents for follow up for multiple sclerosis and cervical stenosis. We reviewed her repeat C-spine MRI which does show ongoing contrast enhancement. This makes this more likely associated with cervical stenosis as a demyelinating lesion would likely have had resolution of contrast enhancement by this time.     We discussed the implication of this that there is active/worsening compression. This could cause worsening gait/balance sensory changes or even weakness over time. We discussed that surgery can sometimes be necessary to stop symptoms from worsening. She is interested in a second opinion from neurosurgery which is reasonable.     Her exam is significant for brisk reflexes on the right leg and vibratory loss on the right leg. Difficulty with tandem gait.     # multiple sclerosis   # urinary urgency   # gait instability   # cervical stenosis     -continue zeposia    -continue oxybutynin   -blood tests   -return 6 months   -referral for neurosurgery re cervical stenosis     Patient seen and discussed with Dr. Harris.  I have reviewed the plan with the patient, who is in agreement.      Bee Mercedes DO  Multiple Sclerosis Fellow     Attending physician: I saw and evaluated the patient with Dr. Mercedes and I agree with her findings and plan of care as documented above.    I personally reviewed the results of MRI imaging of the cervical spine performed earlier on today's date, and the  following is my independent interpretation of those images. To my eye, there is subtle contrast enhancement at C5-6 contiguous to maximal cervical spine stenosis. I think that the findings are consistent with radiologically significant compressive myelopathy rather than active demyelinating disease.     Although the patient's clinical examination is stable, I discussed with her that there is some risk of further deterioration of function associated with the cervical compression, as well as a risk of acute cord injury. I would be inclined to consider a surgical intervention here, and we will refer her to neurosurgery for a second opinion in this regard.     Regarding her MS diagnosis, we will obtain routine laboratory studies for monitoring of ozanimod to include complete blood counts and hepatic panel. I will also check a vitamin D level and advise her on supplementation with vitamin D as needed to maintain her level within the goal range of 60-80 mcg/L.    I will see her back in 6 months with an MRI scan of the brain to be performed prior to that visit to follow up on the radiologic stability of her condition.    Remainder as above.    Daniel Harris MD   of Neurology  [facility]    Diagnoses:    1) Multiple sclerosis  2) Cervical stenosis  3) Gait disturbance  4) Urinary urgency

## 2022-11-10 NOTE — LETTER
11/10/2022      RE: Shakila Kapoor  [address] [place] [address]     Multiple Sclerosis Clinic Visit  11/10/2022    Reason: Multiple Sclerosis      Source of information: Patient and chart review    History of Present Symptom:  Shakila Kapoor is a 57 year old female with a PMH significant for multiple sclerosis who presents today for follow up.     She reports physical therapy was really helpful for balance and muscle tightness. She reports ongoing balance difficulty and coordination with gait. She reports no new weakness/numbnses. She reports no vision changes or double vision.      No concerns with ozanimod. Taking consistently.     She met with neurophthalmology and her vision remains stable. She has significant vision loss in left eye.      Disease Coruse:   At age 20 left side sensory symptoms and probable optic neuritis. She was initially thought to have probable/possible MS. She did not have any neurologic changes until 2019 when she developed progressive vision loss with right eye. 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 She then did develop demyelinating disease on MRI and was started on Rebif.    Previous disease modifying therapy:   Rebif (ragiologic disease progression)  2021 ozanimod     Symptom management:  Fatigue: Low energy and tiredness continues to be a problem.  -sleep med visit 11/15/22   Bowel/bladder changes: Oxybutynin is working really well for urinary symptoms.       The patient's medical, surgical, social, and family history were personally reviewed with the patient.  Past Medical History:   Diagnosis Date     Benign essential hypertension 5/25/2022     Meningioma (H)      Mixed hyperlipidemia 5/26/2022     Multiple sclerosis (H)       Past Surgical History:   Procedure Laterality Date     NO HISTORY OF SURGERY       Social History     Tobacco Use     Smoking status: Never     Smokeless tobacco: Never   Substance  Use Topics     Alcohol use: Yes     Comment: rarely     Drug use: Never     Family History   Problem Relation Age of Onset     Bipolar Disorder Mother      Hypertension Father      Lung Cancer Maternal Grandmother      Lung Cancer Maternal Grandfather      Lung Cancer Paternal Grandmother      Hirschsprung's Disease Son      Anxiety Disorder Daughter      Glaucoma No family hx of      Macular Degeneration No family hx of      Current Outpatient Medications   Medication     oxybutynin ER (DITROPAN XL) 10 MG 24 hr tablet     Ozanimod HCl (ZEPOSIA) 0.92 MG CAPS     PARoxetine (PAXIL) 30 MG tablet     White Petrolatum-Mineral Oil (REFRESH P.M.) OINT     ZEPOSIA STARTER KIT 0.23MG & 0.46MG & 0.92MG CPPK     No current facility-administered medications for this visit.     Allergies   Allergen Reactions     Penicillins      Other reaction(s): *Unknown         Review of Systems:  14-point review of systems was completed. The pertinent positives and negatives are in the HPI.    Physical Examination   General: Patient appears comfortable in no acute distress.   HEENT: NC/AT, no icterus, moist mucous membranes  Chest: non-labored on RA  Extremities: Warm, no edema  Skin: No rash or lesion   Psych: Affect appropriate for situation   Neuro:  Mental status: Awake, alert, attentive. Language is fluent with intact comprehension of commands.  Cranial nerves:  conjugate gaze, EOMI, visual fields intact, face symmetric, shoulder shrug strong, tongue protrusion/uvula midline, no dysarthria.   Motor: Normal muscle bulk and tone.     R L  Deltoid  5 5  Biceps  5 5  Triceps 5 5  Wrist ext 5 5  Finger ext 5 5  Finger abd 5 5    Hip flexion 5 5  Knee flexion 5 5  Knee ext 5 5  Dorsiflexion 5 5    Reflexes: 2+ reflexes symmetric in biceps, brachioradialis, brisk on the right patellae, and achilles. No clonus, toes down-going.  Sensory: Reduced vibration in the right foot (severe). Romberg is negative.   Coordination: FNF and HS without  ataxia or dysmetria.    Gait: Normal width, stride length, turn, with symmetric arm swing. Tandem walk is moderately difficult.     Laboratory:  Vit D 37     CBC RESULTS: Recent Labs   Lab Test 11/10/22  1459   WBC 6.8   RBC 5.06   HGB 15.4   HCT 45.7   MCV 90   MCH 30.4   MCHC 33.7   RDW 12.8        AST/ALT 16/24     Imaging:    MRI c-spine 11/10/2022  Extensive T2 hyperintensity patchy in multiple areas in the cervical cord.Sternosi at C5-C6 with corresponding subtle contrast enhancement persistent since last MRI 6 months prior.     Assessment/Plan:  Shakila Kapoor is a 57 year old female who presents for follow up for multiple sclerosis and cervical stenosis. We reviewed her repeat C-spine MRI which does show ongoing contrast enhancement. This makes this more likely associated with cervical stenosis as a demyelinating lesion would likely have had resolution of contrast enhancement by this time.     We discussed the implication of this that there is active/worsening compression. This could cause worsening gait/balance sensory changes or even weakness over time. We discussed that surgery can sometimes be necessary to stop symptoms from worsening. She is interested in a second opinion from neurosurgery which is reasonable.     Her exam is significant for brisk reflexes on the right leg and vibratory loss on the right leg. Difficulty with tandem gait.     # multiple sclerosis   # urinary urgency   # gait instability   # cervical stenosis     -continue zeposia    -continue oxybutynin   -blood tests   -return 6 months   -referral for neurosurgery re cervical stenosis     Patient seen and discussed with Dr. Harris.  I have reviewed the plan with the patient, who is in agreement.      Bee Mercedes DO  Multiple Sclerosis Fellow     Attending physician: I saw and evaluated the patient with Dr. Mercedes and I agree with her findings and plan of care as documented above.    I personally reviewed the results of  MRI imaging of the cervical spine performed earlier on today's date, and the following is my independent interpretation of those images. To my eye, there is subtle contrast enhancement at C5-6 contiguous to maximal cervical spine stenosis. I think that the findings are consistent with radiologically significant compressive myelopathy rather than active demyelinating disease.     Although the patient's clinical examination is stable, I discussed with her that there is some risk of further deterioration of function associated with the cervical compression, as well as a risk of acute cord injury. I would be inclined to consider a surgical intervention here, and we will refer her to neurosurgery for a second opinion in this regard.     Regarding her MS diagnosis, we will obtain routine laboratory studies for monitoring of ozanimod to include complete blood counts and hepatic panel. I will also check a vitamin D level and advise her on supplementation with vitamin D as needed to maintain her level within the goal range of 60-80 mcg/L.    I will see her back in 6 months with an MRI scan of the brain to be performed prior to that visit to follow up on the radiologic stability of her condition.    Remainder as above.    Daniel Harris MD   of Neurology  [facility]    Diagnoses:    1) Multiple sclerosis  2) Cervical stenosis  3) Gait disturbance  4) Urinary urgency    Cc:  Margarita Suazo DNP (PCP)  Abdulaziz Pride MD (Radiation Oncology)  Samuel Soto MD (Ophthalmology)  Patient

## 2022-11-10 NOTE — PATIENT INSTRUCTIONS
Continue Zeposia    2.   Continue oxybutynin    3.  Blood tests today    4.  Return to clinic in 6 months    5.  We will refer you to the neurosurgery clinic at the [facility] for a second opinion regarding known cervical stenosis

## 2022-11-11 LAB — DEPRECATED CALCIDIOL+CALCIFEROL SERPL-MC: 20 UG/L (ref 20–75)

## 2022-11-15 ENCOUNTER — VIRTUAL VISIT (OUTPATIENT)
Dept: SLEEP MEDICINE | Facility: CLINIC | Age: 57
End: 2022-11-15
Attending: PSYCHIATRY & NEUROLOGY
Payer: COMMERCIAL

## 2022-11-15 VITALS — BODY MASS INDEX: 32.49 KG/M2 | WEIGHT: 195 LBS | HEIGHT: 65 IN

## 2022-11-15 DIAGNOSIS — R06.83 SNORING: ICD-10-CM

## 2022-11-15 DIAGNOSIS — G47.19 EXCESSIVE DAYTIME SLEEPINESS: ICD-10-CM

## 2022-11-15 PROCEDURE — 99203 OFFICE O/P NEW LOW 30 MIN: CPT | Mod: 95 | Performed by: INTERNAL MEDICINE

## 2022-11-15 ASSESSMENT — SLEEP AND FATIGUE QUESTIONNAIRES
HOW LIKELY ARE YOU TO NOD OFF OR FALL ASLEEP WHILE SITTING INACTIVE IN A PUBLIC PLACE: WOULD NEVER DOZE
HOW LIKELY ARE YOU TO NOD OFF OR FALL ASLEEP WHILE LYING DOWN TO REST IN THE AFTERNOON WHEN CIRCUMSTANCES PERMIT: SLIGHT CHANCE OF DOZING
HOW LIKELY ARE YOU TO NOD OFF OR FALL ASLEEP IN A CAR, WHILE STOPPED FOR A FEW MINUTES IN TRAFFIC: WOULD NEVER DOZE
HOW LIKELY ARE YOU TO NOD OFF OR FALL ASLEEP WHILE SITTING AND TALKING TO SOMEONE: WOULD NEVER DOZE
HOW LIKELY ARE YOU TO NOD OFF OR FALL ASLEEP WHILE SITTING AND READING: WOULD NEVER DOZE
HOW LIKELY ARE YOU TO NOD OFF OR FALL ASLEEP WHILE SITTING QUIETLY AFTER LUNCH WITHOUT ALCOHOL: WOULD NEVER DOZE
HOW LIKELY ARE YOU TO NOD OFF OR FALL ASLEEP WHILE WATCHING TV: SLIGHT CHANCE OF DOZING
HOW LIKELY ARE YOU TO NOD OFF OR FALL ASLEEP WHEN YOU ARE A PASSENGER IN A CAR FOR AN HOUR WITHOUT A BREAK: SLIGHT CHANCE OF DOZING

## 2022-11-15 ASSESSMENT — PAIN SCALES - GENERAL: PAINLEVEL: NO PAIN (0)

## 2022-11-15 NOTE — PROGRESS NOTES
Shakila is a 57 year old who is being evaluated via a billable video visit.      How would you like to obtain your AVS? MyChart  If the video visit is dropped, the invitation should be resent by: Send to e-mail at: [email or web address]  Will anyone else be joining your video visit? Analilia ADAMS       Video-Visit Details    Video Start Time:  2:24    Type of service:  Video Visit    Video End Time:2:46    Originating Location (pt. Location): Home        Distant Location (provider location):  Off-site    Platform used for Video Visit: [employer]

## 2022-11-15 NOTE — PROGRESS NOTES
Name: Shakila Kapoor MRN# [id number]   Age: 57 year old YOB: 1965     Date of Consultation: November 15, 2022  Consultation is requested by: Daniel Harris MD  [id number]  [place] [address] Daniel Harris  Primary care provider: Margarita Suazo       Reason for Sleep Consult:   Shakila Kapoor is sent by Daniel Harris for a sleep consultation regarding fatigue.    Patient s Reason for visit:  Shakila Kapoor main reason for visit:  Tired all the time       Assessment and Plan:   Summary Sleep Diagnoses:    Low concern for primary sleep diagnosis    Comorbid Diagnoses:    Fatigue    Multiple sclerosis with optic neuritis    Hypertension    History of anxiety    Summary Recommendations:    Follow up if any new sleep concerns  No orders of the defined types were placed in this encounter.    Summary Counseling:    - good sleep hygiene  - if you notice worse sleep or sleep difficulties, please contact our office and we could consider a sleep study    Medical Decision-making: Ms. Kapoor is a 57 year old woman with MS referred to sleep medicine for fatigue and tiredness. She cut Diet Coke from her diet last week, and daytime tiredness has improved.  Upon review, appears she is not overly sleepy, but moreso tired. Her epworth sleepiness scale is 3. While she does snore, her  reports it is very soft, and started after pregnancy and then weight gain. Her STOPBANG score is 3, for age, tiredness and hx of hypertension. We discussed option to perform sleep study now, or monitor symptoms and consider in the future, and opted for the latter.           HISTORY PRESENT ILLNESS:   Shakila Kapoor is a 57 year old year old with history of MS.    In the last week, stopped drinking diet coke and has noticed clear improvement in daytime fatigue. Used to drink 4 cans every morning, and now just has 1 cup of coffee in the morning She wonders if the Diet Coke had a bigger  effect than she realized.    PREVIOUS SLEEP TESTING: none         SLEEP-WAKE SCHEDULE:    Work/School Days: Patient goes to school/work: Yes, works as a teacher.   Usually gets into bed at 8:30  Takes patient about maybe 30 minutes to fall asleep  Has trouble falling asleep not often.  Wakes up in the middle of the night once.  She has trouble falling back asleep not often.  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  Uses alarm: Yes    Weekends/Non-work Days/All Other Days:  Usually gets into bed at 8:30-9:30   Takes patient about maybe 30 minutes to fall asleep  Patient is usually up at 7:00  Uses alarm: No    Sleep Need  Patient gets  9 hours sleep on average   Patient thinks she needs about 9 hours sleep    Shakila Kapoor prefers to sleep in this position(s): Side   Patient states they do the following activities in bed: Watch TV    Naps  Patient takes a purposeful nap maybe twice in a month times a week and naps are usually a hour in duration.  She feels better after a nap: No  She dozes off unintentionally never.  Patient has had a driving accident or near-miss due to sleepiness/drowsiness: No    SLEEP DISRUPTIONS:    Breathing/Snoring  Snoring: Yes, but very soft per her . Started after she got pregnant for the first time, and has stayed with weight gain.  Other people complain about her snoring: No  Others observeshe stops breathing in her sleep: No  She has issues with the following: Getting up to urinate more than once      Movement:  Pain, discomfort, with an urge to move:  No  Happens when she is resting:  No  Happens more at night:  No  Patient has been told she kicks her legs at night:  No       Behaviors in Wakefulness/Sleep:  Dream enactment: No  Sleep eating: No  Bruxism: No                    Shakila Waye has experienced the following behaviors while sleeping:  none  She has experienced sudden muscle weakness during the day: No      CAFFEINE AND OTHER  SUBSTANCES:  Patient consumes caffeinated beverages per day:  just switched to 1 cup of coffee.  was drinking 4 cans of diet coke a day, in the morning.  Last caffeine use is usually: now+6:30 before=1:00.  List of any prescribed or over the counter stimulants that patient takes:  None.  List of any prescribed or over the counter sleep medication patient takes:  None.  List of previous sleep medications that patient has tried:  Na.  Patient drinks alcohol to help them sleep: No  Patient drinks alcohol near bedtime: No    Family History: Patient has a family member been diagnosed with a sleep disorder: Yes mother has severe insomnia.         SCALES:   EPWORTH SLEEPINESS SCALE    Cash Sleepiness Scale ( MARGAUX Chery  1990-1997[facility] [place]/English - Final version - 21 Nov 07 - [facility] [place].) 11/15/2022   Sitting and reading Would never doze   Watching TV Slight chance of dozing   Sitting, inactive in a public place (e.g. a theatre or a meeting) Would never doze   As a passenger in a car for an hour without a break Slight chance of dozing   Lying down to rest in the afternoon when circumstances permit Slight chance of dozing   Sitting and talking to someone Would never doze   Sitting quietly after a lunch without alcohol Would never doze   In a car, while stopped for a few minutes in traffic Would never doze   Cash Score (MC) 3   Cash Score (Sleep) 3     INSOMNIA SEVERITY INDEX (NU)    Insomnia Severity Index (NU) 11/15/2022   Difficulty falling asleep 0   Difficulty staying asleep 0   Problems waking up too early 0   How SATISFIED/DISSATISFIED are you with your CURRENT sleep pattern? 1   How NOTICEABLE to others do you think your sleep problem is in terms of impairing the quality of your life? 0   How WORRIED/DISTRESSED are you about your current sleep problem? 0   To what extent do you consider your sleep problem to INTERFERE with your daily functioning (e.g. daytime fatigue, mood, ability to function  at work/daily chores, concentration, memory, mood, etc.) CURRENTLY? 0   NU Total Score 1   Guidelines for Scoring/Interpretation:  Total score categories:  0-7 = No clinically significant insomnia   8-14 = Subthreshold insomnia   15-21 = Clinical insomnia (moderate severity)  22-28 = Clinical insomnia (severe)  Used via courtesy of www.[employer].va.gov with permission from Akira Restrepo PhD., [facility]    STOP BANG   STOP BANG Questionnaire (  2008, the American Society of Anesthesiologists, Inc. [employer] 11/15/2022   1. Snoring - Do you snore loudly (louder than talking or loud enough to be heard through closed doors)? No   2. Tired - Do you often feel tired, fatigued, or sleepy during daytime? Yes   3. Observed - Has anyone observed you stop breathing during your sleep? No   4. Blood pressure - Do you have or are you being treated for high blood pressure? Yes   5. BMI - BMI more than 35 kg/m2? Yes   6. Age - Age over 50 yr old? Yes   7. Neck circumference - Neck circumference greater than 40 cm? No   8. Gender - Gender male? No   STOP BANG Score (MC): 5 (High risk of MEHRAN)   B/P Clinic: (No Data)   BMI Clinic: 32.45     Allergies:    Allergies   Allergen Reactions     Penicillins      Other reaction(s): *Unknown          Problem List:     Patient Active Problem List   Diagnosis     Anxiety     Cervical high risk HPV (human papillomavirus) test positive     Multiple sclerosis (H)     Primary meningioma of optic nerve sheath (H)     Optic neuritis     Benign essential hypertension     Mixed hyperlipidemia          MEDICATIONS:     Current Outpatient Medications   Medication Sig Dispense Refill     oxybutynin ER (DITROPAN XL) 10 MG 24 hr tablet TAKE 1 TABLET(10 MG) BY MOUTH DAILY 30 tablet 11     Ozanimod HCl (ZEPOSIA) 0.92 MG CAPS Take 0.92 mg by mouth daily 30 capsule 11     PARoxetine (PAXIL) 30 MG tablet        ZEPOSIA STARTER KIT 0.23MG & 0.46MG & 0.92MG CPPK  (Patient not  taking: Reported on 11/15/2022)       Problem List:  Patient Active Problem List    Diagnosis Date Noted     Mixed hyperlipidemia 05/26/2022     Priority: Medium     Benign essential hypertension 05/25/2022     Priority: Medium     Optic neuritis 08/03/2021     Priority: Medium     Primary meningioma of optic nerve sheath (H) 03/26/2021     Priority: Medium     Anxiety 01/13/2021     Priority: Medium     Multiple sclerosis (H) 01/13/2021     Priority: Medium     possible/probable- diagnosed at age 21, flare up just before marriage       Cervical high risk HPV (human papillomavirus) test positive 05/01/2017     Priority: Medium     5/2017 NIL/HPV positive, HPV 16/18 negative   7/2019 NIL/HPV+  9/17/19 Bonnie:benign    Plan:Pap/HPV due 9/2020        Past Medical/Surgical History:  Past Medical History:   Diagnosis Date     Benign essential hypertension 5/25/2022     Meningioma (H)      Mixed hyperlipidemia 5/26/2022     Multiple sclerosis (H)      Past Surgical History:   Procedure Laterality Date     NO HISTORY OF SURGERY       Social History:  Social History     Socioeconomic History     Marital status:      Spouse name: Not on file     Number of children: Not on file     Years of education: Not on file     Highest education level: Not on file   Occupational History     Not on file   Tobacco Use     Smoking status: Never     Smokeless tobacco: Never   Substance and Sexual Activity     Alcohol use: Yes     Comment: rarely     Drug use: Never     Sexual activity: Not on file   Other Topics Concern     Not on file   Social History Narrative     Not on file     Social Determinants of Health     Financial Resource Strain: Not on file   Food Insecurity: Not on file   Transportation Needs: Not on file   Physical Activity: Not on file   Stress: Not on file   Social Connections: Not on file   Intimate Partner Violence: Not on file   Housing Stability: Not on file     Family History:  Family History   Problem Relation  Age of Onset     Bipolar Disorder Mother      Hypertension Father      Lung Cancer Maternal Grandmother      Lung Cancer Maternal Grandfather      Lung Cancer Paternal Grandmother      Hirschsprung's Disease Son      Anxiety Disorder Daughter      Glaucoma No family hx of      Macular Degeneration No family hx of      Review of Systems:  A complete review of systems reviewed by me is negative with the exeption of what has been mentioned in the history of present illness.  In the last TWO WEEKS have you experienced any of the following symptoms?  Fevers: No  Night Sweats: No  Weight Gain: No  Pain at Night: No  Double Vision: No  Changes in Vision: No  Difficulty Breathing through Nose: No  Sore Throat in Morning: No  Dry Mouth in the Morning: No  Shortness of Breath Lying Flat: No  Shortness of Breath With Activity: No  Awakening with Shortness of Breath: No  Increased Cough: No  Heart Racing at Night: No  Swelling in Feet or Legs: Yes  Diarrhea at Night: No  Heartburn at Night: No  Urinating More than Once at Night: Yes  Losing Control of Urine at Night: No  Joint Pains at Night: No  Headaches in Morning: No  Weakness in Arms or Legs: No  Depressed Mood: No  Anxiety: No         Physical Examination:   Appearing well, in no distress. Speech clear, alert and oriented. Breathing comfortably on room air.           Data: All pertinent previous laboratory data reviewed     Recent Labs   Lab Test 05/25/22  0817 05/05/22  1107 09/01/21  1650   NA  --  141 142   POTASSIUM  --  4.3 4.3   CHLORIDE  --  106 108   CO2  --  28 30   ANIONGAP  --  7 4   GLC 88 172* 95   BUN  --  9 12   CR  --  0.80 0.68   ANOOP  --  9.0 9.0     Recent Labs   Lab Test 11/10/22  1459   WBC 6.8   RBC 5.06   HGB 15.4   HCT 45.7   MCV 90   MCH 30.4   MCHC 33.7   RDW 12.8        Recent Labs   Lab Test 11/10/22  1459   PROTTOTAL 6.9   ALBUMIN 4.3   BILITOTAL 0.4   ALKPHOS 77   AST 18   ALT 13     TSH (mU/L)   Date Value   05/05/2022 1.75     Maribeth  DO Priscila  11/15/2022  Pulmonary fellow  This patient was examined and evaluated jointly with Dr. Francois.    Review of records, preparation of chart, education, assessment and plan were developed jointly and discussed with the patient during this face-to-face/telemedicine visit. Total time 25 minutes with > 50% counseling.    BOB NOGUEIRA MD  [facility] Sleep Medicine  [facility]  Department of Medicine

## 2022-11-18 NOTE — TELEPHONE ENCOUNTER
SPINE PATIENTS - NEW PROTOCOL PREVISIT    RECORDS RECEIVED FROM: internal   REASON FOR VISIT: Cervical stenosis of spinal canal   Date of Appt: 12/2/22   NOTES (FOR ALL VISITS) STATUS DETAILS   OFFICE NOTE from referring provider Internal Dr Harris @ [facility] Neurology:  11/10/22   MEDICATION LIST Internal    IMAGING  (FOR ALL VISITS)     MRI (HEAD, NECK, SPINE) Internal [facility]:  MRI Cervical Spine 11/10/22  MRI Cervical Spine 5/5/2

## 2022-11-28 ASSESSMENT — ENCOUNTER SYMPTOMS
HOT FLASHES: 0
DECREASED LIBIDO: 1

## 2022-12-02 ENCOUNTER — OFFICE VISIT (OUTPATIENT)
Dept: NEUROSURGERY | Facility: CLINIC | Age: 57
End: 2022-12-02
Payer: COMMERCIAL

## 2022-12-02 ENCOUNTER — PRE VISIT (OUTPATIENT)
Dept: NEUROSURGERY | Facility: CLINIC | Age: 57
End: 2022-12-02

## 2022-12-02 VITALS — HEART RATE: 93 BPM | DIASTOLIC BLOOD PRESSURE: 87 MMHG | SYSTOLIC BLOOD PRESSURE: 151 MMHG | OXYGEN SATURATION: 97 %

## 2022-12-02 DIAGNOSIS — M48.02 CERVICAL STENOSIS OF SPINAL CANAL: ICD-10-CM

## 2022-12-02 PROCEDURE — 99204 OFFICE O/P NEW MOD 45 MIN: CPT | Performed by: NEUROLOGICAL SURGERY

## 2022-12-02 RX ORDER — LORAZEPAM 0.5 MG/1
.5-1 TABLET ORAL DAILY PRN
COMMUNITY
Start: 2022-11-28

## 2022-12-02 NOTE — NURSING NOTE
Chief Complaint   Patient presents with     Consult     Cervical stenosis of spinal canal     Patrick Blakely

## 2022-12-02 NOTE — LETTER
12/2/2022       RE: Shakila Kapoor  [address] [place] [address]     Dear Colleague,    Thank you for referring your patient, Shakila Kapoor, to the [facility]UROS[facility] [place] at [facility]. Please see a copy of my visit note below.    Date of Service: 12/2/2022    Shakila Kapoor is a 57 year old female with a history of MS who presents for evaluation of spinal cord compression in the cervical spine.  She remained stable neurologically until 2019 when she developed progressive vision loss with right eye. 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 She then did develop demyelinating disease on MRI and was started on Rebif.      Today, she states that she has had ongoing balance difficulty and worsening coordination with gait but she reports no new weakness/numbness.  She denies any significant neck pain or radicular pain.    Past Medical History:   Diagnosis Date     Benign essential hypertension 5/25/2022     Meningioma (H)      Mixed hyperlipidemia 5/26/2022     Multiple sclerosis (H)      Past Surgical History:   Procedure Laterality Date     NO HISTORY OF SURGERY       Current Outpatient Medications   Medication     LORazepam (ATIVAN) 0.5 MG tablet     oxybutynin ER (DITROPAN XL) 10 MG 24 hr tablet     Ozanimod HCl (ZEPOSIA) 0.92 MG CAPS     PARoxetine (PAXIL) 30 MG tablet     ZEPOSIA STARTER KIT 0.23MG & 0.46MG & 0.92MG CPPK     No current facility-administered medications for this visit.     On exam, motor is 5/5, DTRs 3+ in the patellas, 3+ Brachioradialis, Positive Grewal's.  Difficulty with heel-to-toe tandem walk.    I have personally reviewed the cervical MRI which shows mild kyphosis at C4-5 and C5-6 with disc protrusion with cord compression especially at C5-6 with T2-weighted cord signal changes.    Impression/Plan:  Shakila Kapoor presents with a history of MS and  balance/gait difficulty.  Although she denies neck pain or radiculopathy, her cervical spine MRI is a bit concerning due to the T2 weighted cord signal change.  I have explained to her and her , it is difficult to attribute her balance difficulty to the cord compression in the setting of MS but her upper motor neuro signs as well as cord edema is worrisome and it is possible that they can contribute to further worsening of her symptoms.  Therefore, at this time I have advised anterior cervical discectomy and fusion of C4-6 and decompression of the spinal cord.    The nature of the surgery was explained to the patient as well as its risks which include bleeding, infection, nerve injury, no improvement of symptoms, possible flare-up of MS, blood clots(DVT/PE) and complications from anesthesia.  She would like to proceed with surgery.    Be Solorzano MD

## 2022-12-03 NOTE — PROGRESS NOTES
Date of Service: 12/2/2022    Shakila Kapoor is a 57 year old female with a history of MS who presents for evaluation of spinal cord compression in the cervical spine.  She remained stable neurologically until 2019 when she developed progressive vision loss with right eye. 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 She then did develop demyelinating disease on MRI and was started on Rebif.      Today, she states that she has had ongoing balance difficulty and worsening coordination with gait but she reports no new weakness/numbness.  She denies any significant neck pain or radicular pain.    Past Medical History:   Diagnosis Date     Benign essential hypertension 5/25/2022     Meningioma (H)      Mixed hyperlipidemia 5/26/2022     Multiple sclerosis (H)      Past Surgical History:   Procedure Laterality Date     NO HISTORY OF SURGERY       Current Outpatient Medications   Medication     LORazepam (ATIVAN) 0.5 MG tablet     oxybutynin ER (DITROPAN XL) 10 MG 24 hr tablet     Ozanimod HCl (ZEPOSIA) 0.92 MG CAPS     PARoxetine (PAXIL) 30 MG tablet     ZEPOSIA STARTER KIT 0.23MG & 0.46MG & 0.92MG CPPK     No current facility-administered medications for this visit.     On exam, motor is 5/5, DTRs 3+ in the patellas, 3+ Brachioradialis, Positive Grewal's.  Difficulty with heel-to-toe tandem walk.    I have personally reviewed the cervical MRI which shows mild kyphosis at C4-5 and C5-6 with disc protrusion with cord compression especially at C5-6 with T2-weighted cord signal changes.    Impression/Plan:  Shakila Kapoor presents with a history of MS and balance/gait difficulty.  Although she denies neck pain or radiculopathy, her cervical spine MRI is a bit concerning due to the T2 weighted cord signal change.  I have explained to her and her , her balance difficulty could be due to the cord compression as well as her multiple sclerosis.  She has upper motor neuro signs as  well as cord edema on the MRI which is worrisome and it is possible that they can contribute to further worsening of her symptoms.  Therefore, at this time I have advised anterior cervical discectomy and fusion of C4-6 and decompression of the spinal cord.    The nature of the surgery was explained to the patient as well as its risks which include bleeding, infection, nerve injury, no improvement of symptoms, possible flare-up of MS, blood clots(DVT/PE) and complications from anesthesia.  She would like to proceed with surgery.    Be Solorzano MD

## 2022-12-05 DIAGNOSIS — M47.12 CERVICAL SPONDYLOSIS WITH MYELOPATHY: Primary | ICD-10-CM

## 2022-12-06 ENCOUNTER — TELEPHONE (OUTPATIENT)
Dept: NEUROSURGERY | Facility: CLINIC | Age: 57
End: 2022-12-06

## 2022-12-06 NOTE — TELEPHONE ENCOUNTER
I called the patient in regards to scheduling surgery with Dr. Solorzano. Patient covid test is not needed and pre op has been taken care of with PAC via video. Patient is aware that the nursing team will be reaching out within the next few days. I did tell patient that a nurse will reach out within 2-3 days prior to surgery with arrival time and instructions.    Surgeon: Dr. Solorzano  Date of Surgery: 12/20/2022  Location of surgery: [facility]  Pre-Op H&P: 12/16/2022  Post-Op Appt Date: 1/6/2023   Imaging needed:  No  Discussed COVID-19 testing:  Yes  Pre-cert/Authorization completed:  Yes  Patient aware that pre-op RN will call 2-3 days prior to surgery with arrival time and instructions Yes      Anthony Talley on 12/6/2022 at 2:56 PM

## 2022-12-07 NOTE — TELEPHONE ENCOUNTER
FUTURE VISIT INFORMATION      SURGERY INFORMATION:    Date: 22    Location: [facility]    Surgeon:  Be Solorzano MD    Anesthesia Type:  General    Procedure: Anterior Cervical 4-6 Fusion with interbody cages and cervical plate    Consult: ov     RECORDS REQUESTED FROM:       Primary Care Provider: Margarita Suazo NP- [facility]    Pertinent Medical History: hypertension     Most recent EKG+ [id number]

## 2022-12-16 ENCOUNTER — VIRTUAL VISIT (OUTPATIENT)
Dept: SURGERY | Facility: CLINIC | Age: 57
End: 2022-12-16
Payer: COMMERCIAL

## 2022-12-16 ENCOUNTER — ANESTHESIA EVENT (OUTPATIENT)
Dept: SURGERY | Facility: CLINIC | Age: 57
DRG: 473 | End: 2022-12-16
Payer: COMMERCIAL

## 2022-12-16 ENCOUNTER — PRE VISIT (OUTPATIENT)
Dept: SURGERY | Facility: CLINIC | Age: 57
End: 2022-12-16

## 2022-12-16 DIAGNOSIS — Z01.818 PREOP EXAMINATION: Primary | ICD-10-CM

## 2022-12-16 PROCEDURE — 99203 OFFICE O/P NEW LOW 30 MIN: CPT | Mod: 95 | Performed by: NURSE PRACTITIONER

## 2022-12-16 RX ORDER — ACETAMINOPHEN 325 MG/1
325-650 TABLET ORAL EVERY 6 HOURS PRN
COMMUNITY
End: 2023-02-22

## 2022-12-16 ASSESSMENT — PAIN SCALES - GENERAL: PAINLEVEL: NO PAIN (0)

## 2022-12-16 ASSESSMENT — LIFESTYLE VARIABLES: TOBACCO_USE: 0

## 2022-12-16 NOTE — H&P
Pre-Operative H & P     CC:  Preoperative exam to assess for increased cardiopulmonary risk while undergoing surgery and anesthesia.    Date of Encounter: 12/16/2022  Primary Care Physician:  Margarita Suazo     Reason for visit:   Encounter Diagnosis   Name Primary?     Preop examination Yes       HPI  Shakila Kapoor is a 57 year old female who presents for pre-operative H & P in preparation for  Procedure Information     Case: [id number] Date/Time: 12/20/22 0800    Procedure: Anterior Cervical 4-6 Fusion with interbody cages and cervical plate (Spine)    Anesthesia type: General    Diagnosis: Cervical spondylosis with myelopathy [M47.12]    Pre-op diagnosis: Cervical spondylosis with myelopathy [M47.12]    Location:  OR  /  OR    Providers: Be Solorzano MD          Shakila Kapoor has a past medical history for multiple sclerosis, nerve sheath meningioma presenting with progress vision loss of (R) eye  (2019) and is s/p radiotherapy, neurogenic bladder and obesity.    The patient was seen by Dr. Solorzano on 12/2/2022 for evaluation of spinal cord compression in the cervical spine. The patient endorses balance difficulty and worsening coordination.  Dr. Solorzano reviewed the imaging and counseled the patient and her  on the findings and recommended treatment options. the patient presents to the PAC virtually today.    History is obtained from the patient and chart review    Hx of abnormal bleeding or anti-platelet use: denies    Menstrual history: No LMP recorded. Patient is postmenopausal.:      Past Medical History  Past Medical History:   Diagnosis Date     Benign essential hypertension 5/25/2022     Meningioma (H)      Mixed hyperlipidemia 5/26/2022     Multiple sclerosis (H)        Past Surgical History  Past Surgical History:   Procedure Laterality Date     NO HISTORY OF SURGERY         Prior to Admission Medications  Current Outpatient Medications   Medication Sig Dispense Refill     acetaminophen  (TYLENOL) 325 MG tablet Take 325-650 mg by mouth every 6 hours as needed for mild pain       LORazepam (ATIVAN) 0.5 MG tablet as needed       oxybutynin ER (DITROPAN XL) 10 MG 24 hr tablet TAKE 1 TABLET(10 MG) BY MOUTH DAILY (Patient taking differently: 10 mg At Bedtime) 30 tablet 11     Ozanimod HCl (ZEPOSIA) 0.92 MG CAPS Take 0.92 mg by mouth daily (Patient taking differently: Take 0.92 mg by mouth At Bedtime) 30 capsule 11     PARoxetine (PAXIL) 30 MG tablet Take 30 mg by mouth every evening       ZEPOSIA STARTER KIT 0.23MG & 0.46MG & 0.92MG CPPK  (Patient not taking: Reported on 11/15/2022)         Allergies  Allergies   Allergen Reactions     Penicillins      Other reaction(s): *Unknown       Social History  Social History     Socioeconomic History     Marital status:      Spouse name: Not on file     Number of children: Not on file     Years of education: Not on file     Highest education level: Not on file   Occupational History     Not on file   Tobacco Use     Smoking status: Never     Smokeless tobacco: Never   Substance and Sexual Activity     Alcohol use: Not Currently     Comment: rarely     Drug use: Never     Sexual activity: Not on file   Other Topics Concern     Not on file   Social History Narrative     Not on file     Social Determinants of Health     Financial Resource Strain: Not on file   Food Insecurity: Not on file   Transportation Needs: Not on file   Physical Activity: Not on file   Stress: Not on file   Social Connections: Not on file   Intimate Partner Violence: Not on file   Housing Stability: Not on file       Family History  Family History   Problem Relation Age of Onset     Bipolar Disorder Mother      Hypertension Father      Lung Cancer Maternal Grandmother      Lung Cancer Maternal Grandfather      Lung Cancer Paternal Grandmother      Hirschsprung's Disease Son      Anxiety Disorder Daughter      Glaucoma No family hx of      Macular Degeneration No family hx of         Review of Systems  The complete review of systems is negative other than noted in the HPI or here.   Anesthesia Evaluation   Pt has had prior anesthetic. Type: MAC.    No history of anesthetic complications       ROS/MED HX  ENT/Pulmonary:     (+) MEHRAN risk factors, snores loudly, daytime somnolence,  (-) tobacco use   Neurologic: Comment: H/o meningioma s/p radiotherapy 5days/week for 5 weeks.         (+) peripheral neuropathy, - fingertips. Multiple Sclerosis, limitations: with balance difficulty and coordination .     Cardiovascular: Comment: Denies cardiac symptoms including chest pain, SOB, palpitations, syncope, WHITE, orthopnea, or PND.      (+) hypertension (Currently working with PCP on lifestyle modifications. 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  Able to easily walk >2 blocks.  Able to go up and down a flight of stairs.    Hematologic:  - neg hematologic  ROS     Musculoskeletal: Comment: Cervical spondylosis with myelopathy.       GI/Hepatic:  - neg GI/hepatic ROS     Renal/Genitourinary:  - neg Renal ROS     Endo:     (+) Obesity,     Psychiatric/Substance Use:     (+) psychiatric history anxiety  (-) alcohol abuse history and chronic opioid use history   Infectious Disease: Comment: Completed COVID vaccines and boosters.    COVID (+) June 2022 - mild symptoms. No residual.  - neg infectious disease ROS     Malignancy:  - neg malignancy ROS     Other:  - neg other ROS          Virtual visit -  No vitals were obtained    Physical Exam  Constitutional: Awake, alert, cooperative, no apparent distress, and appears stated age.  Eyes: Pupils equal  HENT: Normocephalic  Respiratory: non labored breathing   Neurologic: Awake, alert, oriented to name, place and time.   Neuropsychiatric:  Calm, cooperative. Normal affect.      Prior Labs/Diagnostic Studies   All labs and imaging personally reviewed   Lab Results   Component Value Date    WBC 6.8 11/10/2022     Lab Results   Component Value Date    RBC 5.06 11/10/2022     Lab Results   Component Value Date    HGB 15.4 11/10/2022     Lab Results   Component Value Date    HCT 45.7 11/10/2022     Lab Results   Component Value Date    MCV 90 11/10/2022     Lab Results   Component Value Date    MCH 30.4 11/10/2022     Lab Results   Component Value Date    MCHC 33.7 11/10/2022     Lab Results   Component Value Date    RDW 12.8 11/10/2022     Lab Results   Component Value Date     11/10/2022       Last Comprehensive Metabolic Panel:  Sodium   Date Value Ref Range Status   05/05/2022 141 133 - 144 mmol/L Final     Potassium   Date Value Ref Range Status   05/05/2022 4.3 3.4 - 5.3 mmol/L Final     Chloride   Date Value Ref Range Status   05/05/2022 106 94 - 109 mmol/L Final     Carbon Dioxide (CO2)   Date Value Ref Range Status   05/05/2022 28 20 - 32 mmol/L Final     Anion Gap   Date Value Ref Range Status   05/05/2022 7 3 - 14 mmol/L Final     Glucose   Date Value Ref Range Status   05/25/2022 88 70 - 99 mg/dL Final     Urea Nitrogen   Date Value Ref Range Status   05/05/2022 9 7 - 30 mg/dL Final     Creatinine   Date Value Ref Range Status   05/05/2022 0.80 0.52 - 1.04 mg/dL Final     GFR Estimate   Date Value Ref Range Status   05/05/2022 87 >60 mL/min/1.73m2 Final     Comment:     Effective December 21, 2021 eGFRcr in adults is calculated using the 2021 CKD-EPI creatinine equation which includes age and gender (Blair et al., NEJM, DOI: 10.1056/VJXNez8323076)     Calcium   Date Value Ref Range Status   05/05/2022 9.0 8.5 - 10.1 mg/dL Final       Lab Results   Component Value Date    A1C 5.1 05/25/2022       EKG/ stress test - if available please see in ROS above     The patient's records and results personally reviewed by this provider.     Outside  "records reviewed from: Care Everywhere      Assessment  Shakila Kapoor is a 57 year old female seen as a PAC referral for risk assessment and optimization for anesthesia.    Plan/Recommendations  Pt will be optimized for the proposed procedure.  See below for details on the assessment, risk, and preoperative recommendations    NEUROLOGY  - No history of TIA, CVA or seizure     - Multiple sclerosis with no change in symptoms of balance difficulty and coordantion.   Followed by Dr. Harris.  Treated with Ozaniamod for MS and oxybutynin for neurogenic bladder.     -Post Op delirium risk factors:  No risk identified     - H/o Meningioma that presented with vision deficit s/p radiotherapy (2019)    ENT  - No current airway concerns.  Will need to be reassessed day of surgery.  Mallampati: Unable to assess  TM: Unable to assess    CARDIAC  - Denies known coronary artery disease.  Denies cardiac symptoms.    - HTN, following with her PCP and currently trying lifestyle modifications to try to get BP normotensive.  Continues to follow.     - METS (Metabolic Equivalents) >4 without symptoms.      - RCRI-Very low risk: Class 1 0.4% complication rate            Total Score: 0        PULMONARY  - MEHRAN Medium Risk            Total Score: 3    MEHRAN: Snores loudly    MEHRAN: Often tired    MEHRAN: Over 50 ys old      - Denies asthma or inhaler use  - Tobacco History    History   Smoking Status     Never   Smokeless Tobacco     Never       GI  - denies h/o GERD or GERD symptoms.     - PONV High Risk  Total Score: 3           1 AN PONV: Pt is Female    1 AN PONV: Patient is not a current smoker    1 AN PONV: Intended Post Op Opioids        /RENAL  - Baseline Creatinine  0.80    ENDOCRINE    - BMI: Estimated body mass index is 32.45 kg/m  as calculated from the following:    Height as of 11/15/22: 1.651 m (5' 5\").    Weight as of 11/15/22: 88.5 kg (195 lb).  Obesity (BMI >30) , encouraged weight loss as will also help decrease BP. "     -   - No history of Diabetes Mellitus    HEME  VTE Low Risk 0.26%            Total Score: 0      - No history of abnormal bleeding or antiplatelet use.    - Hgb 15.4    - Denies h/o previous blood transfusion    MSK  - Cervical spondylosis with myelopathy.    ~ Frailty screening:   Patient is NOT Frail            Total Score: 0         PSYCH  - Anxiety treated with Paxil.     Different anesthesia methods/types have been discussed with the patient, but they are aware that the final plan will be decided by the assigned anesthesia provider on the date of service.  Patient was discussed with Dr. Hilton    The patient is optimized for their procedure. AVS with information on surgery time/arrival time, meds and NPO status given by nursing staff. No further diagnostic testing indicated.    Please refer to the physical examination documented by the anesthesiologist in the anesthesia record on the day of surgery.    Video-Visit Details    Type of service:  Video Visit    Provider received verbal consent for a Video Visit from the patient? Yes    Video Start Time: 7:36  Video End Time (time video stopped): 7:51    Originating Location (pt. Location): Home  Distant Location (provider location): Off-site    Mode of Communication:  Video Conference via G[employer] Windows  On the day of service:     Prep time: 10 minutes  Visit time: 15 minutes  Documentation time: 15 minutes  ------------------------------------------  Total time: 40 minutes      CARLOS Lebron CNP  Preoperative Assessment Center  [facility]  Phone: [phone]  Fax: [phone]

## 2022-12-16 NOTE — PROGRESS NOTES
Shakila is a 57 year old who is being evaluated via a billable video visit.      How would you like to obtain your AVS? MyChart      HPI         Review of Systems         Objective    Vitals - Patient Reported  Pain Score: No Pain (0)        Physical Exam         SIMONE Burton LPN

## 2022-12-19 ASSESSMENT — LIFESTYLE VARIABLES: TOBACCO_USE: 0

## 2022-12-19 NOTE — ANESTHESIA PREPROCEDURE EVALUATION
Anesthesia Pre-Procedure Evaluation    Patient: Shakila Kapoor   MRN: [id number] : 1965        Procedure : Procedure(s):  Anterior Cervical 4-6 Fusion with interbody cages and cervical plate          Past Medical History:   Diagnosis Date     Benign essential hypertension 2022     Meningioma (H)      Mixed hyperlipidemia 2022     Multiple sclerosis (H)       Past Surgical History:   Procedure Laterality Date     NO HISTORY OF SURGERY        Allergies   Allergen Reactions     Penicillins      Other reaction(s): *Unknown      Social History     Tobacco Use     Smoking status: Never     Smokeless tobacco: Never   Substance Use Topics     Alcohol use: Not Currently     Comment: rarely      Wt Readings from Last 1 Encounters:   11/15/22 88.5 kg (195 lb)        Anesthesia Evaluation   Pt has had prior anesthetic. Type: MAC.    No history of anesthetic complications       ROS/MED HX  ENT/Pulmonary:     (+) MEHRAN risk factors, snores loudly, daytime somnolence,  (-) tobacco use   Neurologic: Comment: H/o meningioma s/p radiotherapy 5days/week for 5 weeks.         (+) peripheral neuropathy, - fingertips. Multiple Sclerosis, limitations: with balance difficulty and coordination .     Cardiovascular: Comment: Denies cardiac symptoms including chest pain, SOB, palpitations, syncope, WHITE, orthopnea, or PND.      (+) hypertension (Currently working with PCP on lifestyle modifications. 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  Able to easily walk >2 blocks.  Able to go up and down a flight of stairs.    Hematologic:  - neg hematologic  ROS     Musculoskeletal: Comment: Cervical spondylosis with myelopathy.       GI/Hepatic:  - neg GI/hepatic ROS     Renal/Genitourinary:  - neg Renal ROS     Endo:      (+) Obesity,     Psychiatric/Substance Use:     (+) psychiatric history anxiety  (-) alcohol abuse history and chronic opioid use history   Infectious Disease: Comment: Completed COVID vaccines and boosters.    COVID (+) June 2022 - mild symptoms. No residual.  - neg infectious disease ROS     Malignancy:  - neg malignancy ROS     Other:  - neg other ROS          Physical Exam    Airway        Mallampati: II   TM distance: > 3 FB   Neck ROM: full   Mouth opening: > 3 cm    Respiratory Devices and Support         Dental  no notable dental history         Cardiovascular   cardiovascular exam normal          Pulmonary   pulmonary exam normal                OUTSIDE LABS:  CBC:   Lab Results   Component Value Date    WBC 6.8 11/10/2022    WBC 5.4 05/05/2022    HGB 15.4 11/10/2022    HGB 15.1 05/05/2022    HCT 45.7 11/10/2022    HCT 44.5 05/05/2022     11/10/2022     05/05/2022     BMP:   Lab Results   Component Value Date     05/05/2022     09/01/2021    POTASSIUM 4.3 05/05/2022    POTASSIUM 4.3 09/01/2021    CHLORIDE 106 05/05/2022    CHLORIDE 108 09/01/2021    CO2 28 05/05/2022    CO2 30 09/01/2021    BUN 9 05/05/2022    BUN 12 09/01/2021    CR 0.80 05/05/2022    CR 0.68 09/01/2021    GLC 88 05/25/2022     (H) 05/05/2022     COAGS: No results found for: PTT, INR, FIBR  POC: No results found for: BGM, HCG, HCGS  HEPATIC:   Lab Results   Component Value Date    ALBUMIN 4.3 11/10/2022    PROTTOTAL 6.9 11/10/2022    ALT 13 11/10/2022    AST 18 11/10/2022    ALKPHOS 77 11/10/2022    BILITOTAL 0.4 11/10/2022     OTHER:   Lab Results   Component Value Date    A1C 5.1 05/25/2022    ANOOP 9.0 05/05/2022    TSH 1.75 05/05/2022       Anesthesia Plan    ASA Status:  3   NPO Status:  NPO Appropriate    Anesthesia Type: General.     - Airway: ETT   Induction: Intravenous.   Maintenance: Balanced.   Techniques and Equipment:     - Airway: Video-Laryngoscope     - Lines/Monitors: 2nd IV, BIS      Consents    Anesthesia Plan(s) and associated risks, benefits, and realistic alternatives discussed. Questions answered and patient/representative(s) expressed understanding.     - Discussed: Risks, Benefits and Alternatives for BOTH SEDATION and the PROCEDURE were discussed     - Discussed with:  Patient      - Extended Intubation/Ventilatory Support Discussed: Yes.    Use of blood products discussed: Yes.     - Discussed with: Patient.     Postoperative Care    Pain management: IV analgesics, Oral pain medications, Multi-modal analgesia.   PONV prophylaxis: Ondansetron (or other 5HT-3), Dexamethasone or Solumedrol     Comments:           H&P reviewed: Unable to attach H&P to encounter due to EHR limitations. H&P Update: appropriate H&P reviewed, patient examined. No interval changes since H&P (within 30 days).         Marcy Acosta MD

## 2022-12-20 ENCOUNTER — APPOINTMENT (OUTPATIENT)
Dept: GENERAL RADIOLOGY | Facility: CLINIC | Age: 57
DRG: 473 | End: 2022-12-20
Attending: NEUROLOGICAL SURGERY
Payer: COMMERCIAL

## 2022-12-20 ENCOUNTER — ANESTHESIA (OUTPATIENT)
Dept: SURGERY | Facility: CLINIC | Age: 57
DRG: 473 | End: 2022-12-20
Payer: COMMERCIAL

## 2022-12-20 ENCOUNTER — HOSPITAL ENCOUNTER (INPATIENT)
Facility: CLINIC | Age: 57
LOS: 3 days | Discharge: HOME | DRG: 473 | End: 2022-12-23
Attending: NEUROLOGICAL SURGERY | Admitting: NEUROLOGICAL SURGERY
Payer: COMMERCIAL

## 2022-12-20 DIAGNOSIS — Z98.1 S/P CERVICAL SPINAL FUSION: Primary | ICD-10-CM

## 2022-12-20 LAB
ABO/RH(D): NORMAL
ANTIBODY SCREEN: NEGATIVE
GLUCOSE BLDC GLUCOMTR-MCNC: 92 MG/DL (ref 70–99)
HOLD SPECIMEN: NORMAL
SPECIMEN EXPIRATION DATE: NORMAL

## 2022-12-20 PROCEDURE — 250N000011 HC RX IP 250 OP 636

## 2022-12-20 PROCEDURE — 250N000013 HC RX MED GY IP 250 OP 250 PS 637: Performed by: NEUROLOGICAL SURGERY

## 2022-12-20 PROCEDURE — 250N000011 HC RX IP 250 OP 636: Performed by: STUDENT IN AN ORGANIZED HEALTH CARE EDUCATION/TRAINING PROGRAM

## 2022-12-20 PROCEDURE — C1713 ANCHOR/SCREW BN/BN,TIS/BN: HCPCS | Performed by: NEUROLOGICAL SURGERY

## 2022-12-20 PROCEDURE — 999N000141 HC STATISTIC PRE-PROCEDURE NURSING ASSESSMENT: Performed by: NEUROLOGICAL SURGERY

## 2022-12-20 PROCEDURE — 120N000002 HC R&B MED SURG/OB UMMC

## 2022-12-20 PROCEDURE — 250N000013 HC RX MED GY IP 250 OP 250 PS 637

## 2022-12-20 PROCEDURE — 710N000010 HC RECOVERY PHASE 1, LEVEL 2, PER MIN: Performed by: NEUROLOGICAL SURGERY

## 2022-12-20 PROCEDURE — 278N000051 HC OR IMPLANT GENERAL: Performed by: NEUROLOGICAL SURGERY

## 2022-12-20 PROCEDURE — 36415 COLL VENOUS BLD VENIPUNCTURE: CPT | Performed by: STUDENT IN AN ORGANIZED HEALTH CARE EDUCATION/TRAINING PROGRAM

## 2022-12-20 PROCEDURE — 272N000002 HC OR SUPPLY OTHER OPNP: Performed by: NEUROLOGICAL SURGERY

## 2022-12-20 PROCEDURE — 250N000025 HC SEVOFLURANE, PER MIN: Performed by: NEUROLOGICAL SURGERY

## 2022-12-20 PROCEDURE — 86901 BLOOD TYPING SEROLOGIC RH(D): CPT | Performed by: STUDENT IN AN ORGANIZED HEALTH CARE EDUCATION/TRAINING PROGRAM

## 2022-12-20 PROCEDURE — 258N000003 HC RX IP 258 OP 636

## 2022-12-20 PROCEDURE — 370N000017 HC ANESTHESIA TECHNICAL FEE, PER MIN: Performed by: NEUROLOGICAL SURGERY

## 2022-12-20 PROCEDURE — 258N000003 HC RX IP 258 OP 636: Performed by: NEUROLOGICAL SURGERY

## 2022-12-20 PROCEDURE — 4A11X4G MONITORING OF PERIPHERAL NERVOUS ELECTRICAL ACTIVITY, INTRAOPERATIVE, EXTERNAL APPROACH: ICD-10-PCS | Performed by: NEUROLOGICAL SURGERY

## 2022-12-20 PROCEDURE — 00NW0ZZ RELEASE CERVICAL SPINAL CORD, OPEN APPROACH: ICD-10-PCS | Performed by: NEUROLOGICAL SURGERY

## 2022-12-20 PROCEDURE — 250N000013 HC RX MED GY IP 250 OP 250 PS 637: Performed by: NURSE PRACTITIONER

## 2022-12-20 PROCEDURE — 258N000003 HC RX IP 258 OP 636: Performed by: STUDENT IN AN ORGANIZED HEALTH CARE EDUCATION/TRAINING PROGRAM

## 2022-12-20 PROCEDURE — 250N000009 HC RX 250: Performed by: NEUROLOGICAL SURGERY

## 2022-12-20 PROCEDURE — 250N000009 HC RX 250: Performed by: STUDENT IN AN ORGANIZED HEALTH CARE EDUCATION/TRAINING PROGRAM

## 2022-12-20 PROCEDURE — 0RB30ZZ EXCISION OF CERVICAL VERTEBRAL DISC, OPEN APPROACH: ICD-10-PCS | Performed by: NEUROLOGICAL SURGERY

## 2022-12-20 PROCEDURE — 86850 RBC ANTIBODY SCREEN: CPT | Performed by: STUDENT IN AN ORGANIZED HEALTH CARE EDUCATION/TRAINING PROGRAM

## 2022-12-20 PROCEDURE — 250N000011 HC RX IP 250 OP 636: Performed by: NEUROLOGICAL SURGERY

## 2022-12-20 PROCEDURE — 0RG20A0 FUSION OF 2 OR MORE CERVICAL VERTEBRAL JOINTS WITH INTERBODY FUSION DEVICE, ANTERIOR APPROACH, ANTERIOR COLUMN, OPEN APPROACH: ICD-10-PCS | Performed by: NEUROLOGICAL SURGERY

## 2022-12-20 PROCEDURE — 272N000001 HC OR GENERAL SUPPLY STERILE: Performed by: NEUROLOGICAL SURGERY

## 2022-12-20 PROCEDURE — 360N000085 HC SURGERY LEVEL 5 W/ FLUORO, PER MIN: Performed by: NEUROLOGICAL SURGERY

## 2022-12-20 PROCEDURE — 999N000179 XR SURGERY CARM FLUORO LESS THAN 5 MIN W STILLS: Mod: TC

## 2022-12-20 DEVICE — IMPLANTABLE DEVICE: Type: IMPLANTABLE DEVICE | Site: SPINE CERVICAL | Status: FUNCTIONAL

## 2022-12-20 RX ORDER — CLINDAMYCIN PHOSPHATE 900 MG/50ML
900 INJECTION, SOLUTION INTRAVENOUS EVERY 8 HOURS
Status: COMPLETED | OUTPATIENT
Start: 2022-12-20 | End: 2022-12-21

## 2022-12-20 RX ORDER — HYDROMORPHONE HYDROCHLORIDE 1 MG/ML
0.4 INJECTION, SOLUTION INTRAMUSCULAR; INTRAVENOUS; SUBCUTANEOUS EVERY 5 MIN PRN
Status: DISCONTINUED | OUTPATIENT
Start: 2022-12-20 | End: 2022-12-20

## 2022-12-20 RX ORDER — FENTANYL CITRATE 50 UG/ML
25 INJECTION, SOLUTION INTRAMUSCULAR; INTRAVENOUS EVERY 5 MIN PRN
Status: DISCONTINUED | OUTPATIENT
Start: 2022-12-20 | End: 2022-12-20

## 2022-12-20 RX ORDER — KETAMINE HYDROCHLORIDE 10 MG/ML
INJECTION INTRAMUSCULAR; INTRAVENOUS PRN
Status: DISCONTINUED | OUTPATIENT
Start: 2022-12-20 | End: 2022-12-20

## 2022-12-20 RX ORDER — ACETAMINOPHEN 325 MG/1
975 TABLET ORAL ONCE
Status: COMPLETED | OUTPATIENT
Start: 2022-12-20 | End: 2022-12-20

## 2022-12-20 RX ORDER — FENTANYL CITRATE 50 UG/ML
50 INJECTION, SOLUTION INTRAMUSCULAR; INTRAVENOUS EVERY 5 MIN PRN
Status: DISCONTINUED | OUTPATIENT
Start: 2022-12-20 | End: 2022-12-20

## 2022-12-20 RX ORDER — HYDRALAZINE HYDROCHLORIDE 20 MG/ML
10 INJECTION INTRAMUSCULAR; INTRAVENOUS EVERY 6 HOURS PRN
Status: DISCONTINUED | OUTPATIENT
Start: 2022-12-20 | End: 2022-12-22

## 2022-12-20 RX ORDER — ACETAMINOPHEN 325 MG/1
650 TABLET ORAL EVERY 4 HOURS PRN
Status: DISCONTINUED | OUTPATIENT
Start: 2022-12-23 | End: 2022-12-22

## 2022-12-20 RX ORDER — GABAPENTIN 300 MG/1
300 CAPSULE ORAL
Status: COMPLETED | OUTPATIENT
Start: 2022-12-20 | End: 2022-12-20

## 2022-12-20 RX ORDER — PROPOFOL 10 MG/ML
INJECTION, EMULSION INTRAVENOUS CONTINUOUS PRN
Status: DISCONTINUED | OUTPATIENT
Start: 2022-12-20 | End: 2022-12-20

## 2022-12-20 RX ORDER — LIDOCAINE 40 MG/G
CREAM TOPICAL
Status: DISCONTINUED | OUTPATIENT
Start: 2022-12-20 | End: 2022-12-20 | Stop reason: HOSPADM

## 2022-12-20 RX ORDER — ACETAMINOPHEN 325 MG/1
975 TABLET ORAL EVERY 8 HOURS
Status: DISCONTINUED | OUTPATIENT
Start: 2022-12-20 | End: 2022-12-22

## 2022-12-20 RX ORDER — ONDANSETRON 2 MG/ML
4 INJECTION INTRAMUSCULAR; INTRAVENOUS EVERY 6 HOURS PRN
Status: DISCONTINUED | OUTPATIENT
Start: 2022-12-20 | End: 2022-12-23 | Stop reason: HOSPADM

## 2022-12-20 RX ORDER — DEXAMETHASONE SODIUM PHOSPHATE 4 MG/ML
INJECTION, SOLUTION INTRA-ARTICULAR; INTRALESIONAL; INTRAMUSCULAR; INTRAVENOUS; SOFT TISSUE PRN
Status: DISCONTINUED | OUTPATIENT
Start: 2022-12-20 | End: 2022-12-20

## 2022-12-20 RX ORDER — HYDROMORPHONE HCL IN WATER/PF 6 MG/30 ML
0.2 PATIENT CONTROLLED ANALGESIA SYRINGE INTRAVENOUS
Status: DISCONTINUED | OUTPATIENT
Start: 2022-12-20 | End: 2022-12-21

## 2022-12-20 RX ORDER — POLYETHYLENE GLYCOL 3350 17 G/17G
17 POWDER, FOR SOLUTION ORAL DAILY
Status: DISCONTINUED | OUTPATIENT
Start: 2022-12-21 | End: 2022-12-22

## 2022-12-20 RX ORDER — PROCHLORPERAZINE MALEATE 5 MG
10 TABLET ORAL EVERY 6 HOURS PRN
Status: DISCONTINUED | OUTPATIENT
Start: 2022-12-20 | End: 2022-12-23 | Stop reason: HOSPADM

## 2022-12-20 RX ORDER — NALOXONE HYDROCHLORIDE 0.4 MG/ML
0.4 INJECTION, SOLUTION INTRAMUSCULAR; INTRAVENOUS; SUBCUTANEOUS
Status: DISCONTINUED | OUTPATIENT
Start: 2022-12-20 | End: 2022-12-23 | Stop reason: HOSPADM

## 2022-12-20 RX ORDER — PHENYLEPHRINE HCL IN 0.9% NACL 50MG/250ML
.5-1.25 PLASTIC BAG, INJECTION (ML) INTRAVENOUS CONTINUOUS
Status: DISCONTINUED | OUTPATIENT
Start: 2022-12-20 | End: 2022-12-20

## 2022-12-20 RX ORDER — HYDROMORPHONE HYDROCHLORIDE 1 MG/ML
0.2 INJECTION, SOLUTION INTRAMUSCULAR; INTRAVENOUS; SUBCUTANEOUS EVERY 5 MIN PRN
Status: DISCONTINUED | OUTPATIENT
Start: 2022-12-20 | End: 2022-12-20

## 2022-12-20 RX ORDER — OXYCODONE HYDROCHLORIDE 5 MG/1
5 TABLET ORAL EVERY 4 HOURS PRN
Status: DISCONTINUED | OUTPATIENT
Start: 2022-12-20 | End: 2022-12-20

## 2022-12-20 RX ORDER — SODIUM CHLORIDE, SODIUM LACTATE, POTASSIUM CHLORIDE, CALCIUM CHLORIDE 600; 310; 30; 20 MG/100ML; MG/100ML; MG/100ML; MG/100ML
INJECTION, SOLUTION INTRAVENOUS CONTINUOUS
Status: DISCONTINUED | OUTPATIENT
Start: 2022-12-20 | End: 2022-12-20 | Stop reason: HOSPADM

## 2022-12-20 RX ORDER — ONDANSETRON 2 MG/ML
4 INJECTION INTRAMUSCULAR; INTRAVENOUS EVERY 30 MIN PRN
Status: DISCONTINUED | OUTPATIENT
Start: 2022-12-20 | End: 2022-12-20

## 2022-12-20 RX ORDER — ONDANSETRON 4 MG/1
4 TABLET, ORALLY DISINTEGRATING ORAL EVERY 30 MIN PRN
Status: DISCONTINUED | OUTPATIENT
Start: 2022-12-20 | End: 2022-12-20

## 2022-12-20 RX ORDER — OXYCODONE HYDROCHLORIDE 10 MG/1
10 TABLET ORAL EVERY 4 HOURS PRN
Status: DISCONTINUED | OUTPATIENT
Start: 2022-12-20 | End: 2022-12-20

## 2022-12-20 RX ORDER — SODIUM CHLORIDE, SODIUM LACTATE, POTASSIUM CHLORIDE, CALCIUM CHLORIDE 600; 310; 30; 20 MG/100ML; MG/100ML; MG/100ML; MG/100ML
INJECTION, SOLUTION INTRAVENOUS CONTINUOUS
Status: DISCONTINUED | OUTPATIENT
Start: 2022-12-20 | End: 2022-12-20

## 2022-12-20 RX ORDER — HYDRALAZINE HYDROCHLORIDE 20 MG/ML
2.5-5 INJECTION INTRAMUSCULAR; INTRAVENOUS EVERY 10 MIN PRN
Status: DISCONTINUED | OUTPATIENT
Start: 2022-12-20 | End: 2022-12-20

## 2022-12-20 RX ORDER — PROPOFOL 10 MG/ML
INJECTION, EMULSION INTRAVENOUS PRN
Status: DISCONTINUED | OUTPATIENT
Start: 2022-12-20 | End: 2022-12-20

## 2022-12-20 RX ORDER — ONDANSETRON 2 MG/ML
INJECTION INTRAMUSCULAR; INTRAVENOUS PRN
Status: DISCONTINUED | OUTPATIENT
Start: 2022-12-20 | End: 2022-12-20

## 2022-12-20 RX ORDER — NALOXONE HYDROCHLORIDE 0.4 MG/ML
0.2 INJECTION, SOLUTION INTRAMUSCULAR; INTRAVENOUS; SUBCUTANEOUS
Status: DISCONTINUED | OUTPATIENT
Start: 2022-12-20 | End: 2022-12-23 | Stop reason: HOSPADM

## 2022-12-20 RX ORDER — SODIUM CHLORIDE, SODIUM LACTATE, POTASSIUM CHLORIDE, CALCIUM CHLORIDE 600; 310; 30; 20 MG/100ML; MG/100ML; MG/100ML; MG/100ML
INJECTION, SOLUTION INTRAVENOUS CONTINUOUS PRN
Status: DISCONTINUED | OUTPATIENT
Start: 2022-12-20 | End: 2022-12-20

## 2022-12-20 RX ORDER — GLYCOPYRROLATE 0.2 MG/ML
INJECTION, SOLUTION INTRAMUSCULAR; INTRAVENOUS PRN
Status: DISCONTINUED | OUTPATIENT
Start: 2022-12-20 | End: 2022-12-20

## 2022-12-20 RX ORDER — HYDROMORPHONE HCL IN WATER/PF 6 MG/30 ML
0.2 PATIENT CONTROLLED ANALGESIA SYRINGE INTRAVENOUS EVERY 5 MIN PRN
Status: DISCONTINUED | OUTPATIENT
Start: 2022-12-20 | End: 2022-12-20

## 2022-12-20 RX ORDER — LABETALOL HYDROCHLORIDE 5 MG/ML
10 INJECTION, SOLUTION INTRAVENOUS
Status: COMPLETED | OUTPATIENT
Start: 2022-12-20 | End: 2022-12-20

## 2022-12-20 RX ORDER — LIDOCAINE HYDROCHLORIDE AND EPINEPHRINE 10; 10 MG/ML; UG/ML
INJECTION, SOLUTION INFILTRATION; PERINEURAL PRN
Status: DISCONTINUED | OUTPATIENT
Start: 2022-12-20 | End: 2022-12-20 | Stop reason: HOSPADM

## 2022-12-20 RX ORDER — LIDOCAINE HYDROCHLORIDE 20 MG/ML
INJECTION, SOLUTION INFILTRATION; PERINEURAL PRN
Status: DISCONTINUED | OUTPATIENT
Start: 2022-12-20 | End: 2022-12-20

## 2022-12-20 RX ORDER — HYDROMORPHONE HCL IN WATER/PF 6 MG/30 ML
0.4 PATIENT CONTROLLED ANALGESIA SYRINGE INTRAVENOUS EVERY 5 MIN PRN
Status: DISCONTINUED | OUTPATIENT
Start: 2022-12-20 | End: 2022-12-20

## 2022-12-20 RX ORDER — AMOXICILLIN 250 MG
1 CAPSULE ORAL 2 TIMES DAILY
Status: DISCONTINUED | OUTPATIENT
Start: 2022-12-20 | End: 2022-12-22

## 2022-12-20 RX ORDER — SODIUM CHLORIDE 9 MG/ML
INJECTION, SOLUTION INTRAVENOUS CONTINUOUS
Status: ACTIVE | OUTPATIENT
Start: 2022-12-20 | End: 2022-12-21

## 2022-12-20 RX ORDER — FENTANYL CITRATE 50 UG/ML
INJECTION, SOLUTION INTRAMUSCULAR; INTRAVENOUS PRN
Status: DISCONTINUED | OUTPATIENT
Start: 2022-12-20 | End: 2022-12-20

## 2022-12-20 RX ORDER — OXYCODONE HYDROCHLORIDE 5 MG/1
5 TABLET ORAL EVERY 4 HOURS PRN
Status: DISCONTINUED | OUTPATIENT
Start: 2022-12-20 | End: 2022-12-23 | Stop reason: HOSPADM

## 2022-12-20 RX ORDER — METHOCARBAMOL 500 MG/1
500 TABLET, FILM COATED ORAL 4 TIMES DAILY
Status: DISCONTINUED | OUTPATIENT
Start: 2022-12-21 | End: 2022-12-22

## 2022-12-20 RX ORDER — OXYCODONE HYDROCHLORIDE 10 MG/1
10 TABLET ORAL EVERY 4 HOURS PRN
Status: DISCONTINUED | OUTPATIENT
Start: 2022-12-20 | End: 2022-12-23 | Stop reason: HOSPADM

## 2022-12-20 RX ORDER — LIDOCAINE 40 MG/G
CREAM TOPICAL
Status: DISCONTINUED | OUTPATIENT
Start: 2022-12-20 | End: 2022-12-23 | Stop reason: HOSPADM

## 2022-12-20 RX ORDER — BISACODYL 10 MG
10 SUPPOSITORY, RECTAL RECTAL DAILY PRN
Status: DISCONTINUED | OUTPATIENT
Start: 2022-12-20 | End: 2022-12-23 | Stop reason: HOSPADM

## 2022-12-20 RX ORDER — ONDANSETRON 4 MG/1
4 TABLET, ORALLY DISINTEGRATING ORAL EVERY 6 HOURS PRN
Status: DISCONTINUED | OUTPATIENT
Start: 2022-12-20 | End: 2022-12-23 | Stop reason: HOSPADM

## 2022-12-20 RX ORDER — HYDROMORPHONE HCL IN WATER/PF 6 MG/30 ML
0.4 PATIENT CONTROLLED ANALGESIA SYRINGE INTRAVENOUS
Status: DISCONTINUED | OUTPATIENT
Start: 2022-12-20 | End: 2022-12-21

## 2022-12-20 RX ADMIN — FENTANYL CITRATE 50 MCG: 50 INJECTION, SOLUTION INTRAMUSCULAR; INTRAVENOUS at 13:00

## 2022-12-20 RX ADMIN — DEXAMETHASONE SODIUM PHOSPHATE 10 MG: 4 INJECTION, SOLUTION INTRA-ARTICULAR; INTRALESIONAL; INTRAMUSCULAR; INTRAVENOUS; SOFT TISSUE at 08:27

## 2022-12-20 RX ADMIN — SODIUM CHLORIDE, POTASSIUM CHLORIDE, SODIUM LACTATE AND CALCIUM CHLORIDE: 600; 310; 30; 20 INJECTION, SOLUTION INTRAVENOUS at 08:10

## 2022-12-20 RX ADMIN — ONDANSETRON 4 MG: 2 INJECTION INTRAMUSCULAR; INTRAVENOUS at 11:46

## 2022-12-20 RX ADMIN — OXYCODONE HYDROCHLORIDE 5 MG: 5 TABLET ORAL at 21:16

## 2022-12-20 RX ADMIN — CLINDAMYCIN IN 5 PERCENT DEXTROSE 900 MG: 18 INJECTION, SOLUTION INTRAVENOUS at 19:21

## 2022-12-20 RX ADMIN — FENTANYL CITRATE 100 MCG: 50 INJECTION, SOLUTION INTRAMUSCULAR; INTRAVENOUS at 08:18

## 2022-12-20 RX ADMIN — FENTANYL CITRATE 50 MCG: 50 INJECTION, SOLUTION INTRAMUSCULAR; INTRAVENOUS at 13:15

## 2022-12-20 RX ADMIN — SENNOSIDES AND DOCUSATE SODIUM 1 TABLET: 8.6; 5 TABLET ORAL at 19:35

## 2022-12-20 RX ADMIN — PHENYLEPHRINE HYDROCHLORIDE 150 MCG: 10 INJECTION INTRAVENOUS at 10:56

## 2022-12-20 RX ADMIN — HYDROMORPHONE HYDROCHLORIDE 0.2 MG: 0.2 INJECTION, SOLUTION INTRAMUSCULAR; INTRAVENOUS; SUBCUTANEOUS at 14:05

## 2022-12-20 RX ADMIN — HYDROMORPHONE HYDROCHLORIDE 0.5 MG: 1 INJECTION, SOLUTION INTRAMUSCULAR; INTRAVENOUS; SUBCUTANEOUS at 09:43

## 2022-12-20 RX ADMIN — Medication 0.5 MCG/KG/MIN: at 08:38

## 2022-12-20 RX ADMIN — LABETALOL HYDROCHLORIDE 10 MG: 5 INJECTION, SOLUTION INTRAVENOUS at 12:59

## 2022-12-20 RX ADMIN — GLYCOPYRROLATE 0.2 MG: 0.2 INJECTION, SOLUTION INTRAMUSCULAR; INTRAVENOUS at 08:26

## 2022-12-20 RX ADMIN — PHENYLEPHRINE HYDROCHLORIDE 200 MCG: 10 INJECTION INTRAVENOUS at 08:26

## 2022-12-20 RX ADMIN — SODIUM CHLORIDE, POTASSIUM CHLORIDE, SODIUM LACTATE AND CALCIUM CHLORIDE: 600; 310; 30; 20 INJECTION, SOLUTION INTRAVENOUS at 13:06

## 2022-12-20 RX ADMIN — LIDOCAINE HYDROCHLORIDE 100 MG: 20 INJECTION, SOLUTION INFILTRATION; PERINEURAL at 08:18

## 2022-12-20 RX ADMIN — ACETAMINOPHEN 975 MG: 325 TABLET, FILM COATED ORAL at 17:34

## 2022-12-20 RX ADMIN — SODIUM CHLORIDE, PRESERVATIVE FREE: 5 INJECTION INTRAVENOUS at 18:35

## 2022-12-20 RX ADMIN — PROPOFOL 200 MG: 10 INJECTION, EMULSION INTRAVENOUS at 08:18

## 2022-12-20 RX ADMIN — HYDROMORPHONE HYDROCHLORIDE 0.4 MG: 0.2 INJECTION, SOLUTION INTRAMUSCULAR; INTRAVENOUS; SUBCUTANEOUS at 13:49

## 2022-12-20 RX ADMIN — HYDRALAZINE HYDROCHLORIDE 10 MG: 20 INJECTION INTRAMUSCULAR; INTRAVENOUS at 19:44

## 2022-12-20 RX ADMIN — OXYCODONE HYDROCHLORIDE 5 MG: 5 TABLET ORAL at 19:35

## 2022-12-20 RX ADMIN — REMIFENTANIL HYDROCHLORIDE 200 MCG: 1 INJECTION, POWDER, LYOPHILIZED, FOR SOLUTION INTRAVENOUS at 08:18

## 2022-12-20 RX ADMIN — ACETAMINOPHEN 975 MG: 325 TABLET, FILM COATED ORAL at 06:57

## 2022-12-20 RX ADMIN — VANCOMYCIN HYDROCHLORIDE 1500 MG: 10 INJECTION, POWDER, LYOPHILIZED, FOR SOLUTION INTRAVENOUS at 07:39

## 2022-12-20 RX ADMIN — GABAPENTIN 300 MG: 300 CAPSULE ORAL at 06:58

## 2022-12-20 RX ADMIN — PROPOFOL 130 MCG/KG/MIN: 10 INJECTION, EMULSION INTRAVENOUS at 08:30

## 2022-12-20 RX ADMIN — Medication 20 MG: at 11:39

## 2022-12-20 RX ADMIN — MIDAZOLAM 2 MG: 1 INJECTION INTRAMUSCULAR; INTRAVENOUS at 08:08

## 2022-12-20 RX ADMIN — PHENYLEPHRINE HYDROCHLORIDE 100 MCG: 10 INJECTION INTRAVENOUS at 08:18

## 2022-12-20 RX ADMIN — HYDROMORPHONE HYDROCHLORIDE 0.25 MG: 1 INJECTION, SOLUTION INTRAMUSCULAR; INTRAVENOUS; SUBCUTANEOUS at 11:58

## 2022-12-20 RX ADMIN — Medication 30 MG: at 08:18

## 2022-12-20 RX ADMIN — PHENYLEPHRINE HYDROCHLORIDE 200 MCG: 10 INJECTION INTRAVENOUS at 08:36

## 2022-12-20 RX ADMIN — HYDROMORPHONE HYDROCHLORIDE 0.4 MG: 0.2 INJECTION, SOLUTION INTRAMUSCULAR; INTRAVENOUS; SUBCUTANEOUS at 13:32

## 2022-12-20 RX ADMIN — HYDROMORPHONE HYDROCHLORIDE 0.4 MG: 0.2 INJECTION, SOLUTION INTRAMUSCULAR; INTRAVENOUS; SUBCUTANEOUS at 22:28

## 2022-12-20 RX ADMIN — REMIFENTANIL HYDROCHLORIDE 0.05 MCG/KG/MIN: 1 INJECTION, POWDER, LYOPHILIZED, FOR SOLUTION INTRAVENOUS at 08:29

## 2022-12-20 ASSESSMENT — ACTIVITIES OF DAILY LIVING (ADL)
ADLS_ACUITY_SCORE: 20

## 2022-12-20 ASSESSMENT — VISUAL ACUITY: OU: BASELINE

## 2022-12-20 NOTE — ANESTHESIA CARE TRANSFER NOTE
Patient: Shakila Kapoor    Procedure: Procedure(s):  Anterior Cervical 4-6 Fusion with interbody cages and cervical plate       Diagnosis: Cervical spondylosis with myelopathy [M47.12]  Diagnosis Additional Information: No value filed.    Anesthesia Type:   General     Note:    Oropharynx: oropharynx clear of all foreign objects and spontaneously breathing  Level of Consciousness: drowsy  Oxygen Supplementation: nasal cannula  Level of Supplemental Oxygen (L/min / FiO2): 4  Independent Airway: airway patency satisfactory and stable  Dentition: dentition unchanged  Vital Signs Stable: post-procedure vital signs reviewed and stable  Report to RN Given: handoff report given  Patient transferred to: PACU    Handoff Report: Identifed the Patient, Identified the Reponsible Provider, Reviewed the pertinent medical history, Discussed the surgical course, Reviewed Intra-OP anesthesia mangement and issues during anesthesia, Set expectations for post-procedure period and Allowed opportunity for questions and acknowledgement of understanding      Vitals:  Vitals Value Taken Time   BP     Temp     Pulse 111 12/20/22 1238   Resp 13 12/20/22 1238   SpO2 96 % 12/20/22 1238   Vitals shown include unvalidated device data.    Electronically Signed By: Marcy Acosta MD  December 20, 2022  12:39 PM

## 2022-12-20 NOTE — LETTER
[facility] [address] [address]  [phone]    FACSIMILE TRANSMITTAL SHEET    TO: Every[employer]       NOTES/COMMENTS: New referral home PT/OT. Please call [facility] [phone] once determined if able to accept.                                         IF YOU DID NOT RECEIVE THE CORRECT NUMBER OF PAGES OR THE FAX DID NOT COME THROUGH CLEARLY, PLEASE CALL THE SENDER     CONFIDENTIALITY STATEMENT: Confidential information that may accompany this transmission contains protected health information under state and federal law and is legally privileged. This information is intended only for the use of the individual or entity named above and may be used only for carrying out treatment, payment or other healthcare operations. The recipient or person responsible for delivering this information is prohibited by law from disclosing this information without proper authorization to any other party, unless required to do so by law or regulation. If you are not the intended recipient, you are hereby notified that any review, dissemination, distribution, or copying of this message is strictly prohibited. If you have received this communication in error, please destroy the materials and contact us immediately by calling the number listed above. No response indicates that the information was received by the appropriate authorized party

## 2022-12-20 NOTE — ANESTHESIA PROCEDURE NOTES
Airway       Patient location during procedure: OR       Procedure Start/Stop Times: 12/20/2022 8:23 AM  Staff -        Anesthesiologist:  Daniel Del Cid MD       Resident/Fellow: Marcy Acosta MD       Performed By: residentIndications and Patient Condition       Indications for airway management: domonique-procedural       Induction type:intravenous       Mask difficulty assessment: 0 - not attempted    Final Airway Details       Final airway type: endotracheal airway       Successful airway: ETT - single  Endotracheal Airway Details        ETT size (mm): 7.0       Cuffed: yes       Successful intubation technique: direct laryngoscopy       DL Blade Type: MAC 3       Grade View of Cords: 1       Adjucts: stylet       Position: Left       Measured from: gums/teeth       Secured at (cm): 20       Bite block used: Soft    Post intubation assessment        Placement verified by: equal breath sounds and chest rise        Number of attempts at approach: 1       Number of other approaches attempted: 0       Secured with: pink tape       Ease of procedure: easy       Dentition: Unchanged    Medication(s) Administered   Medication Administration Time: 12/20/2022 8:23 AM    Additional Comments       Intubated with Kel bolus 2/2 request for no rocuronium and MS as CI to succinylcholine

## 2022-12-20 NOTE — LETTER
[facility] [address] [address]  [phone]    FACSIMILE TRANSMITTAL SHEET    TO: [facility]     NOTES/COMMENTS: New referral home PT/OT. Please call [facility] [phone] once determined if able to accept.                                         IF YOU DID NOT RECEIVE THE CORRECT NUMBER OF PAGES OR THE FAX DID NOT COME THROUGH CLEARLY, PLEASE CALL THE SENDER     CONFIDENTIALITY STATEMENT: Confidential information that may accompany this transmission contains protected health information under state and federal law and is legally privileged. This information is intended only for the use of the individual or entity named above and may be used only for carrying out treatment, payment or other healthcare operations. The recipient or person responsible for delivering this information is prohibited by law from disclosing this information without proper authorization to any other party, unless required to do so by law or regulation. If you are not the intended recipient, you are hereby notified that any review, dissemination, distribution, or copying of this message is strictly prohibited. If you have received this communication in error, please destroy the materials and contact us immediately by calling the number listed above. No response indicates that the information was received by the appropriate authorized party

## 2022-12-20 NOTE — BRIEF OP NOTE
[facility]    Brief Operative Note    Pre-operative diagnosis: Cervical spondylosis with myelopathy [M47.12]  Post-operative diagnosis Cervical spondylosis with myelopathy [M47.12]    Procedure: Procedure(s):  Anterior Cervical 4-6 Fusion with interbody cages and cervical plate  Surgeon: Surgeon(s) and Role:     * Be Solorzano MD - Primary     * Yovany Miller MD - Resident - Assisting  Anesthesia: General   Estimated Blood Loss: 20cc    Drains: None.  Specimens: * No specimens in log *  Findings:  Good final placement of instrumentation on intraoperative imaging.  Complications: None.  Implants:   Implant Name Type Inv. Item Serial No.  Lot No. LRB No. Used Action   CASCADIA interbody system cervical interbody size [id number]    K2M [id number] N/A 1 Implanted   VESUVIUS DBM Putty 100   [id number] K2M [id number] N/A 1 Implanted   CASCADIA Interbody System Cervical interbody [id number]    K2M [id number] N/A 1 Implanted   OZARK View Plates 2-level plates 40mm   NA K2M  N/A 1 Implanted   OZARK view plates 2.3x 12mm drill   N/A DELMAR  N/A 1 Used as a Supply   OZARK View self-starting fixed screws 4x 16mm   NA K2M  N/A 2 Implanted   OZARK View self-starting variable screws 4x14mm   NA K2M  N/A 1 Implanted   OZARK View self-starting variable screws 4x 16mm   NA K2M  N/A 3 Implanted

## 2022-12-20 NOTE — LETTER
[facility] [address] [address]  [phone]    FACSIMILE TRANSMITTAL SHEET    TO: [facility]       NOTES/COMMENTS: New referral home PT/OT. Please call [facility] [phone] once determined if able to accept.                                       IF YOU DID NOT RECEIVE THE CORRECT NUMBER OF PAGES OR THE FAX DID NOT COME THROUGH CLEARLY, PLEASE CALL THE SENDER     CONFIDENTIALITY STATEMENT: Confidential information that may accompany this transmission contains protected health information under state and federal law and is legally privileged. This information is intended only for the use of the individual or entity named above and may be used only for carrying out treatment, payment or other healthcare operations. The recipient or person responsible for delivering this information is prohibited by law from disclosing this information without proper authorization to any other party, unless required to do so by law or regulation. If you are not the intended recipient, you are hereby notified that any review, dissemination, distribution, or copying of this message is strictly prohibited. If you have received this communication in error, please destroy the materials and contact us immediately by calling the number listed above. No response indicates that the information was received by the appropriate authorized party

## 2022-12-20 NOTE — ANESTHESIA POSTPROCEDURE EVALUATION
Patient: Shakila Kapoor    Procedure: Procedure(s):  Anterior Cervical 4-6 Fusion with interbody cages and cervical plate       Anesthesia Type:  General    Note:  Disposition: Admission   Postop Pain Control: Uneventful            Sign Out: Well controlled pain   PONV: No   Neuro/Psych: Uneventful            Sign Out: Acceptable/Baseline neuro status   Airway/Respiratory: Uneventful            Sign Out: Acceptable/Baseline resp. status   CV/Hemodynamics: Uneventful            Sign Out: Acceptable CV status; No obvious hypovolemia; No obvious fluid overload   Other NRE: NONE   DID A NON-ROUTINE EVENT OCCUR? No           Last vitals:  Vitals Value Taken Time   /93 12/20/22 1330   Temp     Pulse 105 12/20/22 1336   Resp 15 12/20/22 1336   SpO2 95 % 12/20/22 1336   Vitals shown include unvalidated device data.    Electronically Signed By: Daniel Del Cid MD  December 20, 2022  1:36 PM

## 2022-12-20 NOTE — LETTER
[facility] [address] [address]  [phone]    FACSIMILE TRANSMITTAL SHEET    TO: [facility]       NOTES/COMMENTS: New referral home PT/OT. Please call [facility] [phone] once determined if able to accept.                                         IF YOU DID NOT RECEIVE THE CORRECT NUMBER OF PAGES OR THE FAX DID NOT COME THROUGH CLEARLY, PLEASE CALL THE SENDER     CONFIDENTIALITY STATEMENT: Confidential information that may accompany this transmission contains protected health information under state and federal law and is legally privileged. This information is intended only for the use of the individual or entity named above and may be used only for carrying out treatment, payment or other healthcare operations. The recipient or person responsible for delivering this information is prohibited by law from disclosing this information without proper authorization to any other party, unless required to do so by law or regulation. If you are not the intended recipient, you are hereby notified that any review, dissemination, distribution, or copying of this message is strictly prohibited. If you have received this communication in error, please destroy the materials and contact us immediately by calling the number listed above. No response indicates that the information was received by the appropriate authorized party

## 2022-12-20 NOTE — LETTER
[facility] [address] [address]  [phone]    FACSIMILE TRANSMITTAL SHEET    TO: [facility]     NOTES/COMMENTS: New referral home PT/OT. Please call [facility] [phone] once determined if able to accept.                                       IF YOU DID NOT RECEIVE THE CORRECT NUMBER OF PAGES OR THE FAX DID NOT COME THROUGH CLEARLY, PLEASE CALL THE SENDER     CONFIDENTIALITY STATEMENT: Confidential information that may accompany this transmission contains protected health information under state and federal law and is legally privileged. This information is intended only for the use of the individual or entity named above and may be used only for carrying out treatment, payment or other healthcare operations. The recipient or person responsible for delivering this information is prohibited by law from disclosing this information without proper authorization to any other party, unless required to do so by law or regulation. If you are not the intended recipient, you are hereby notified that any review, dissemination, distribution, or copying of this message is strictly prohibited. If you have received this communication in error, please destroy the materials and contact us immediately by calling the number listed above. No response indicates that the information was received by the appropriate authorized party

## 2022-12-20 NOTE — OP NOTE
Neurosurgery Operative Note    Procedure Date: 12/20/2022    PREOPERATIVE DIAGNOSIS:    1. C4-6 Cervical spondylosis with myelopathy.  2. Cervical kyphosis.    POSTOPERATIVE DIAGNOSIS:    1. C4-6 Cervical spondylosis with myelopathy.  2. Cervical kyphosis.    PROCEDURES PERFORMED:     1. C4-5 diskectomy and decompression of spinal cord.  2. Placement of [place] interbody cage at C4-5 for anterior arthrodesis.  3. C5-6 diskectomy and decompression of spinal cord.  4. Placement of Trino [place] interbody cage at C5-6 for anterior arthrodesis.  5. Placement of 40mm K2M [place] cervical plate at C4-6.  6.  Use of intraoperative C-arm Fluoroscopy.  7.  Use of intraoperative neuromonitoring.  8.  Use of intraoperative microscope.    ATTENDING SURGEON:  Be Solorzano MD    RESIDENT SURGEON:     Yovany Miller MD    ANESTHESIA:  General.    ESTIMATED BLOOD LOSS:  20 mL    IMPLANTS:  [place] cages  C4-5: 54d32c4 deg  C5-6: 70w96x7 deg  C4 screws: 2x 4.0x16 variable  C5 screws: 4.0x14 variable, 4.0x16 variable  C6 screws: 2x 4.0x16 fixed    K2M [place] cervical plate 40mm    INTRAOPERATIVE FINDINGS:  Good final placement of instrumentation on intraoperative imaging.  Neuromonitoring remained stable.    INDICATIONS FOR PROCEDURE:  Ms. Kapoor is a 57-year-old female with a history of multiple sclerosis.  She presented to medical attention again with worsening balance and intermittent tingling involving her fingers and toes.  Cervical imaging demonstrated significant spondylosis at C4 to C5 and C5 to C6, worse at C5 to C6.  There is associated T2 signal cord change.  It is unclear if the patient's tingling in her toes and fingers was associated with cervical spondylosis or MS.  Regardless, the patient presented with symptoms of myelopathy with T2 signal change.  Given the patient's presenting symptoms as well as imaging findings, she was indicated for the aforementioned procedure.  After thorough conversation of the  risks and benefits of surgery, the patient elected to move forward with the offered surgical procedure.    DESCRIPTION OF PROCEDURE:  The patient was marked and consented in the preoperative area.  She was brought to the operating room where general anesthesia was induced and the patient was intubated and Acevedo was placed.  A gel roll was placed under her shoulders and her head was placed on pink donut.  We then marked anatomic landmarks and neuromonitoring leads were then placed.  The C-arm was brought to the field and we obtained an x-ray to localize the skin incision.  She had a neck crease that would work for our skin incision.  She was then prepped and draped in normal sterile fashion.  A timeout was called, confirming the patient's identity as well as intended procedure.    A #10 blade was used to open the skin.  Monopolar cautery was used to carry the dissection through the underlying adipose tissue and open the platysma widely.  We then undermined the platysma cephalad and caudally.  We dissected along the medial edge of the sternocleidomastoid finding the anatomic dissection plane.  We continued to follow this down until we identified the carotid sheath.  Using a Cloward, we then deviated medially towards the spine.  The anterior spinal fascia was identified and opened using Metzenbaum scissors.  We then noted the longus colli muscle.  The midline of the longus colli was identified and a spinal needle was placed.  C-arm was then brought to the field.  An intraoperative x-ray demonstrated that the spinal needle was at the C5 to C6 level.  This concluded our spinal timeout.      The C5-6 area was then marked using monopolar cautery.  We then found the disk space 1 level above.  Longus colli was opened using monopolar cautery.  Retractors were placed and we asked our Anesthesia team to deflate the cuff and reinflate to a minimum needed pressure.  The disk space was then opened using a #15 blade.  The overlying  osteophyte was removed with a Kerrison punch.  Aurora pin retractors were then placed and gentle retraction was placed across the disk space.  We then mobilized the disk space using a combination of straight and curved curettes as well as Kerrison punch.  The microscope was then brought to the field and the remainder of the procedure was performed using microsurgical technique.  Once we had performed the diskectomy, we then used the high-speed drill to drill out the uncovertebral junction and to also remove the posterior osteophyte.  Once the canal space had been entered, using a curette as well as a nerve hook, the ligament was opened.  Using Kerrison punches, the intracanal space was decompressed and the dura was identified.  The interbody space was then sized using C-arm guidance.  The aforementioned interbody was selected and packed with DBX allograft.  The endplates were then prepped and, under C-arm guidance, the aforementioned interbody was placed for C4-5 anterior arthrodesis.  Neuromonitoring remained stable.  We then moved the Aurora pins down 1 level and drilled off the osteophytes.    We then performed annulotomy at C5 to C6 using a #15 blade.  Again, the pituitary was used to remove the initial disk and a Kerrison punch was used to take off the osteophyte.  The Aurora pins were then distracted.  This disk space was significantly collapsed with very little disk material.  We predominantly used a high-speed drill and followed the endplates down through the interbody space, identifying the disk space as we continued to move towards the canal.  Once the canal was opened, we mobilized the disk material using a combination of curettes, Kerrison punches, and a nerve hook.  The dura was then reached and we ensured that the canal space was well decompressed.  There was some slight foraminal stenosis on the right side on preoperative imaging, which we began to decompress using Kerrison punches well out over the  nerve root.  The disk space was sized using C-arm guidance and the aforementioned interbody was selected.  This was then packed with DBX putty.  The endplates were prepped and the aforementioned interbody was then placed using C-arm guidance for C5-6 anterior arthrodesis.  Neuromonitoring remained stable.      The wound was then irrigated.  We sized the appropriate plate and, while using C-arm guidance, affixed the 40mm K2M [place] plate to the vertebral bodies between C4 and C6 using the aforementioned screws.  Again, the wound was copiously irrigated and hemostasis was obtained.  Final AP and lateral x-rays were then obtained using the C-arm, which demonstrated good final placement of the instrumentation.  Final neuromonitoring was stable.      We then turned our attention to closure.  The platysma was reapproximated using 3-0 Vicryl sutures in a simple interrupted fashion.  The dermis was reapproximated using 3-0 Vicryls in an inverted interrupted fashion.  Skin was closed using 4-0 Monocryl in a running subcuticular fashion.  The wound was clean with wet followed by dry sponge.  Exofin was applied to the incision.  Once this had dried, the drapes were taken down and general anesthesia was gently reversed and the patient was extubated.  The patient was then taken to the postoperative care area for further postoperative cares.     All counts were correct at the end of the procedure x 2.  Dr. Be Solorzano was present and scrubbed throughout all critical portions of the case and otherwise immediately available.        Be Solorzano MD    As Dictated by JOHN OGLESBY MD    Addendum:  I agree with the operative report as documented in the resident's note.  I was present for the entire procedure.    Be Solorzano MD    D: 2022   T: 2022   MT: [id number]    Name:     EVERTON MOONEY  MRN:      [id number]        Account:        [id number]   :      1965           Procedure Date: 2022      Document: [id number]

## 2022-12-21 ENCOUNTER — APPOINTMENT (OUTPATIENT)
Dept: PHYSICAL THERAPY | Facility: CLINIC | Age: 57
DRG: 473 | End: 2022-12-21
Payer: COMMERCIAL

## 2022-12-21 ENCOUNTER — APPOINTMENT (OUTPATIENT)
Dept: OCCUPATIONAL THERAPY | Facility: CLINIC | Age: 57
DRG: 473 | End: 2022-12-21
Payer: COMMERCIAL

## 2022-12-21 ENCOUNTER — APPOINTMENT (OUTPATIENT)
Dept: GENERAL RADIOLOGY | Facility: CLINIC | Age: 57
DRG: 473 | End: 2022-12-21
Payer: COMMERCIAL

## 2022-12-21 LAB
ANION GAP SERPL CALCULATED.3IONS-SCNC: 10 MMOL/L (ref 7–15)
BASOPHILS # BLD AUTO: 0 10E3/UL (ref 0–0.2)
BASOPHILS NFR BLD AUTO: 0 %
BUN SERPL-MCNC: 14.3 MG/DL (ref 6–20)
CALCIUM SERPL-MCNC: 8.3 MG/DL (ref 8.6–10)
CHLORIDE SERPL-SCNC: 107 MMOL/L (ref 98–107)
CREAT SERPL-MCNC: 0.6 MG/DL (ref 0.51–0.95)
DEPRECATED HCO3 PLAS-SCNC: 23 MMOL/L (ref 22–29)
EOSINOPHIL # BLD AUTO: 0 10E3/UL (ref 0–0.7)
EOSINOPHIL NFR BLD AUTO: 0 %
ERYTHROCYTE [DISTWIDTH] IN BLOOD BY AUTOMATED COUNT: 13.1 % (ref 10–15)
GFR SERPL CREATININE-BSD FRML MDRD: >90 ML/MIN/1.73M2
GLUCOSE SERPL-MCNC: 102 MG/DL (ref 70–99)
HCT VFR BLD AUTO: 40.1 % (ref 35–47)
HGB BLD-MCNC: 13.4 G/DL (ref 11.7–15.7)
IMM GRANULOCYTES # BLD: 0 10E3/UL
IMM GRANULOCYTES NFR BLD: 0 %
LYMPHOCYTES # BLD AUTO: 0.4 10E3/UL (ref 0.8–5.3)
LYMPHOCYTES NFR BLD AUTO: 3 %
MCH RBC QN AUTO: 30.9 PG (ref 26.5–33)
MCHC RBC AUTO-ENTMCNC: 33.4 G/DL (ref 31.5–36.5)
MCV RBC AUTO: 92 FL (ref 78–100)
MONOCYTES # BLD AUTO: 0.9 10E3/UL (ref 0–1.3)
MONOCYTES NFR BLD AUTO: 8 %
NEUTROPHILS # BLD AUTO: 9.6 10E3/UL (ref 1.6–8.3)
NEUTROPHILS NFR BLD AUTO: 89 %
NRBC # BLD AUTO: 0 10E3/UL
NRBC BLD AUTO-RTO: 0 /100
PLATELET # BLD AUTO: 276 10E3/UL (ref 150–450)
POTASSIUM SERPL-SCNC: 3.7 MMOL/L (ref 3.4–5.3)
RBC # BLD AUTO: 4.34 10E6/UL (ref 3.8–5.2)
SODIUM SERPL-SCNC: 140 MMOL/L (ref 136–145)
WBC # BLD AUTO: 10.9 10E3/UL (ref 4–11)

## 2022-12-21 PROCEDURE — 97161 PT EVAL LOW COMPLEX 20 MIN: CPT | Mod: GP

## 2022-12-21 PROCEDURE — 120N000002 HC R&B MED SURG/OB UMMC

## 2022-12-21 PROCEDURE — 250N000013 HC RX MED GY IP 250 OP 250 PS 637: Performed by: STUDENT IN AN ORGANIZED HEALTH CARE EDUCATION/TRAINING PROGRAM

## 2022-12-21 PROCEDURE — 97116 GAIT TRAINING THERAPY: CPT | Mod: GP

## 2022-12-21 PROCEDURE — 97535 SELF CARE MNGMENT TRAINING: CPT | Mod: GO

## 2022-12-21 PROCEDURE — 97166 OT EVAL MOD COMPLEX 45 MIN: CPT | Mod: GO

## 2022-12-21 PROCEDURE — L0174 CERV SR 2PC THOR EXT PRE OTS: HCPCS

## 2022-12-21 PROCEDURE — 85025 COMPLETE CBC W/AUTO DIFF WBC: CPT

## 2022-12-21 PROCEDURE — 250N000011 HC RX IP 250 OP 636

## 2022-12-21 PROCEDURE — 36415 COLL VENOUS BLD VENIPUNCTURE: CPT

## 2022-12-21 PROCEDURE — 97530 THERAPEUTIC ACTIVITIES: CPT | Mod: GP

## 2022-12-21 PROCEDURE — 250N000013 HC RX MED GY IP 250 OP 250 PS 637: Performed by: NURSE PRACTITIONER

## 2022-12-21 PROCEDURE — 999N000065 XR CERVICAL SPINE 2/3 VIEWS

## 2022-12-21 PROCEDURE — 258N000003 HC RX IP 258 OP 636: Performed by: STUDENT IN AN ORGANIZED HEALTH CARE EDUCATION/TRAINING PROGRAM

## 2022-12-21 PROCEDURE — 99024 POSTOP FOLLOW-UP VISIT: CPT | Performed by: NURSE PRACTITIONER

## 2022-12-21 PROCEDURE — 82310 ASSAY OF CALCIUM: CPT

## 2022-12-21 PROCEDURE — L1499 SPINAL ORTHOSIS NOS: HCPCS

## 2022-12-21 PROCEDURE — 97530 THERAPEUTIC ACTIVITIES: CPT | Mod: GO

## 2022-12-21 PROCEDURE — 250N000013 HC RX MED GY IP 250 OP 250 PS 637

## 2022-12-21 PROCEDURE — 72040 X-RAY EXAM NECK SPINE 2-3 VW: CPT | Mod: 26 | Performed by: STUDENT IN AN ORGANIZED HEALTH CARE EDUCATION/TRAINING PROGRAM

## 2022-12-21 RX ORDER — POLYETHYLENE GLYCOL 3350 17 G/17G
17 POWDER, FOR SOLUTION ORAL DAILY
Qty: 14 PACKET | Refills: 0 | Status: SHIPPED | OUTPATIENT
Start: 2022-12-22 | End: 2023-01-05

## 2022-12-21 RX ORDER — METHOCARBAMOL 500 MG/1
500 TABLET, FILM COATED ORAL 4 TIMES DAILY
Qty: 30 TABLET | Refills: 0 | Status: SHIPPED | OUTPATIENT
Start: 2022-12-21 | End: 2023-02-22

## 2022-12-21 RX ORDER — AMOXICILLIN 250 MG
1 CAPSULE ORAL 2 TIMES DAILY
Qty: 28 TABLET | Refills: 0 | Status: SHIPPED | OUTPATIENT
Start: 2022-12-21 | End: 2023-01-04

## 2022-12-21 RX ORDER — OXYCODONE HYDROCHLORIDE 5 MG/1
5 TABLET ORAL
Qty: 45 TABLET | Refills: 0 | Status: SHIPPED | OUTPATIENT
Start: 2022-12-21 | End: 2023-05-19

## 2022-12-21 RX ADMIN — CLINDAMYCIN IN 5 PERCENT DEXTROSE 900 MG: 18 INJECTION, SOLUTION INTRAVENOUS at 01:04

## 2022-12-21 RX ADMIN — OXYCODONE HYDROCHLORIDE 10 MG: 10 TABLET ORAL at 16:26

## 2022-12-21 RX ADMIN — OXYCODONE HYDROCHLORIDE 10 MG: 10 TABLET ORAL at 20:26

## 2022-12-21 RX ADMIN — ACETAMINOPHEN 975 MG: 325 TABLET, FILM COATED ORAL at 17:26

## 2022-12-21 RX ADMIN — METHOCARBAMOL 500 MG: 500 TABLET ORAL at 08:18

## 2022-12-21 RX ADMIN — METHOCARBAMOL 500 MG: 500 TABLET ORAL at 16:26

## 2022-12-21 RX ADMIN — SODIUM CHLORIDE 1000 ML: 9 INJECTION, SOLUTION INTRAVENOUS at 01:40

## 2022-12-21 RX ADMIN — CLINDAMYCIN IN 5 PERCENT DEXTROSE 900 MG: 18 INJECTION, SOLUTION INTRAVENOUS at 08:26

## 2022-12-21 RX ADMIN — OXYCODONE HYDROCHLORIDE 10 MG: 10 TABLET ORAL at 11:51

## 2022-12-21 RX ADMIN — SENNOSIDES AND DOCUSATE SODIUM 1 TABLET: 8.6; 5 TABLET ORAL at 08:18

## 2022-12-21 RX ADMIN — SENNOSIDES AND DOCUSATE SODIUM 1 TABLET: 8.6; 5 TABLET ORAL at 20:19

## 2022-12-21 RX ADMIN — METHOCARBAMOL 500 MG: 500 TABLET ORAL at 20:21

## 2022-12-21 RX ADMIN — HYDROMORPHONE HYDROCHLORIDE 0.4 MG: 0.2 INJECTION, SOLUTION INTRAMUSCULAR; INTRAVENOUS; SUBCUTANEOUS at 04:36

## 2022-12-21 RX ADMIN — METHOCARBAMOL 500 MG: 500 TABLET ORAL at 11:51

## 2022-12-21 RX ADMIN — ACETAMINOPHEN 975 MG: 325 TABLET, FILM COATED ORAL at 08:18

## 2022-12-21 RX ADMIN — OXYCODONE HYDROCHLORIDE 10 MG: 10 TABLET ORAL at 00:58

## 2022-12-21 RX ADMIN — POLYETHYLENE GLYCOL 3350 17 G: 17 POWDER, FOR SOLUTION ORAL at 08:18

## 2022-12-21 RX ADMIN — ACETAMINOPHEN 975 MG: 325 TABLET, FILM COATED ORAL at 00:59

## 2022-12-21 RX ADMIN — HYDROMORPHONE HYDROCHLORIDE 0.4 MG: 0.2 INJECTION, SOLUTION INTRAMUSCULAR; INTRAVENOUS; SUBCUTANEOUS at 08:18

## 2022-12-21 RX ADMIN — HYDROMORPHONE HYDROCHLORIDE 0.4 MG: 0.2 INJECTION, SOLUTION INTRAMUSCULAR; INTRAVENOUS; SUBCUTANEOUS at 01:51

## 2022-12-21 ASSESSMENT — ACTIVITIES OF DAILY LIVING (ADL)
ADLS_ACUITY_SCORE: 20
ADLS_ACUITY_SCORE: 26
ADLS_ACUITY_SCORE: 26
ADLS_ACUITY_SCORE: 20
ADLS_ACUITY_SCORE: 26
ADLS_ACUITY_SCORE: 20
ADLS_ACUITY_SCORE: 26
ADLS_ACUITY_SCORE: 20
ADLS_ACUITY_SCORE: 20
ADLS_ACUITY_SCORE: 26
PREVIOUS_RESPONSIBILITIES: MEAL PREP;HOUSEKEEPING;LAUNDRY;SHOPPING;YARDWORK;MEDICATION MANAGEMENT;FINANCES;DRIVING;WORK
ADLS_ACUITY_SCORE: 20
ADLS_ACUITY_SCORE: 20

## 2022-12-21 NOTE — DISCHARGE SUMMARY
"[facility] Discharge Summary and Instructions    Shakila Kapoor MRN# [id number]   Age: 57 year old YOB: 1965     Date of Admission:  12/20/2022  Date of Discharge::  12/21/2022  Admitting Physician:  Be Solorzano MD  Discharge Physician:  Be Solorzano MD          Admission Diagnoses:   Cervical spondylosis with myelopathy [M47.12]  S/P cervical spinal fusion [Z98.1]          Discharge Diagnosis:     Cervical spondylosis with myelopathy [M47.12]  S/P cervical spinal fusion [Z98.1]     Clinically Significant Risk Factors Present on Admission              # Obesity: Estimated body mass index is 32.43 kg/m  as calculated from the following:    Height as of this encounter: 1.651 m (5' 5\").    Weight as of this encounter: 88.4 kg (194 lb 14.4 oz).           Procedures:   12/20/2022  1. C4-5 diskectomy and decompression of spinal cord.  2. Placement of Trino [place] interbody cage at C4-5 for anterior arthrodesis.  3. C5-6 diskectomy and decompression of spinal cord.  4. Placement of [place] interbody cage at C5-6 for anterior arthrodesis.  5. Placement of 40mm K2M [place] cervical plate at C4-6.  6.  Use of intraoperative C-arm Fluoroscopy.  7.  Use of intraoperative neuromonitoring.  8.  Use of intraoperative microscope.           Brief History of Illness:   Ms. Kapoor is a 57-year-old female with a history of multiple sclerosis.  She presented to medical attention again with worsening balance and intermittent tingling involving her fingers and toes.  Cervical imaging demonstrated significant spondylosis at C4 to C5 and C5 to C6, worse at C5 to C6.  There is associated T2 signal cord change.  It is unclear if the patient's tingling in her toes and fingers was associated with cervical spondylosis or MS.  Regardless, the patient presented with symptoms of myelopathy with T2 signal change.  Given the patient's presenting symptoms as well as imaging findings, she was indicated for the " aforementioned procedure.  After thorough conversation of the risks and benefits of surgery, the patient elected to move forward with the offered surgical procedure.           Hospital Course:   Following surgery the patient was transferred to the general neurosurgical floor.  The patient complained of posterior neck and shoulder pain. Patient complained of sore throat, however did not display signs of dysphagia.    Patient was assessed by PT and OT who felt he was safe to discharge home with home therapies.    Post operative x-rays revealed proper positioning of surgical hardware.    Patient was tolerating regular diet, voiding without monge, passing flatus/bowel movements, and pain was controlled on oral analgesia.    Patient remained medically stable throughout course.   Patient was neurologically intact upon discharge.   Patient will follow up with Neurosurgery in 2 weeks for wound check.             Discharge Medications:     Current Discharge Medication List      START taking these medications    Details   methocarbamol (ROBAXIN) 500 MG tablet Take 1 tablet (500 mg) by mouth 4 times daily  Qty: 30 tablet, Refills: 0    Associated Diagnoses: S/P cervical spinal fusion      oxyCODONE (ROXICODONE) 5 MG tablet Take 1 tablet (5 mg) by mouth every 3 hours as needed for moderate pain (4-6)  Qty: 45 tablet, Refills: 0    Associated Diagnoses: S/P cervical spinal fusion      polyethylene glycol (MIRALAX) 17 g packet Take 17 g by mouth daily for 14 days  Qty: 14 packet, Refills: 0    Associated Diagnoses: S/P cervical spinal fusion      senna-docusate (SENOKOT-S/PERICOLACE) 8.6-50 MG tablet Take 1 tablet by mouth 2 times daily for 14 days  Qty: 28 tablet, Refills: 0    Associated Diagnoses: S/P cervical spinal fusion         CONTINUE these medications which have NOT CHANGED    Details   acetaminophen (TYLENOL) 325 MG tablet Take 325-650 mg by mouth every 6 hours as needed for mild pain      LORazepam (ATIVAN) 0.5 MG  tablet Take 0.5-1 mg by mouth daily as needed      oxybutynin ER (DITROPAN XL) 10 MG 24 hr tablet TAKE 1 TABLET(10 MG) BY MOUTH DAILY  Qty: 30 tablet, Refills: 11    Associated Diagnoses: Neurogenic bladder      Ozanimod HCl (ZEPOSIA) 0.92 MG CAPS Take 0.92 mg by mouth daily  Qty: 30 capsule, Refills: 11    Associated Diagnoses: Multiple sclerosis (H)      PARoxetine (PAXIL) 30 MG tablet Take 30 mg by mouth every evening            Objective:   Temp:  [97.9  F (36.6  C)-98.7  F (37.1  C)] 98.6  F (37  C)  Pulse:  [102-120] 106  Resp:  [12-23] 14  BP: (121-185)/() 145/94  SpO2:  [93 %-99 %] 96 %  I/O last 3 completed shifts:  In: 1487 [I.V.:1487]  Out: 2317 [Urine:2297; Blood:20]     Gen: Appears comfortable, NAD  Wound: Incision, clean, dry, intact without strikethrough  Neurologic:  - Alert & Oriented to person, place, time, and situation  - Follows commands briskly  - Speech fluent, spontaneous. No aphasia or dysarthria.  - No gaze preference. No apparent hemineglect.  - PERRL, EOMI  - Strong eye closure, jaw clench, and cheek puff  - Face symmetric with sensation intact to light touch  - Palate elevates symmetrically, uvula midline, tongue protrudes midline  - Trapezii and sternocleidomastoid muscles 5/5 bilaterally  - No pronator drift       Del Tr Bi WE WF Gr   R 5 5 5 5 5 5   L 5 5 5 5 5 5     HF KE KF DF PF EHL   R 5 5 5 5 5 5   L 5 5 5 5 5 5      Reflexes 2+ throughout     Sensation intact and symmetric to light touch throughout              Discharge Instructions and Follow-Up:     Discharge diet: Regular   Discharge activity: You may advance activity as tolerated. No strenuous exercise or heay lifting greater than 10 lbs for 4 weeks or until seen and cleared in clinic.     Discharge follow-up:   Follow-up Dr. Be Solorzano MD on 1/06/2023, all additional follow-up visits to be determined by Dr. Be Solorzano MD       Wound care: Ok to shower,however no scrubbing of the wound and no soaking of the wound,  meaning no bathtubs or swimming pools. Pat dry only. Leave wound open to air.  Patient to have wound checked 2 weeks following surgery.    Wound location: posterior cervical   Closure technique: monocryl  Dressing needs: none  Post-op care at follow-up: Keep dry and clean             Please call if you have:  1. increased pain, redness, drainage, swelling at your incision  2. fevers > 101.5 F degrees  3. with any questions or concerns.  You may reach the Neurosurgery clinic at [phone] during regular work hours. ER at [phone].    and ask for the Neurosurgery Resident on call at [phone], during off hours or weekends.         Discharge Disposition:     Discharged to home        CARLOS Feliciano, CNP  Department of Neurosurgery  Pager: [phone]

## 2022-12-21 NOTE — PLAN OF CARE
Status: s/p C4-6 ACDF  Vitals: HTN below parameters, Tachycardic 105-115  Neuros: A/Ox4, R eye blind. Otherwise intact    IV: PIV w/ NS @ 75 mL/hr   Resp/trach: 2L NC w/ capno   Diet: Regular, tolerated ice water overnight   Bowel status: LBM 12/18  : baseline neurogenic bladder, bladder scanned at 0450 for 413. Able to ambulate to bathroom with staff assist and able to void.   Skin: anterior incision w/o edema   Pain: Throat/neck pain managed with IV dilaudid and PO tylenol/oxycodone overnight   Activity: Sat at edge of bed overnight with minimal assist   Plan: Continue to monitor

## 2022-12-21 NOTE — PROGRESS NOTES
"[facility], [place]   Neurosurgery Progress Note:    Date of service: 12/21/2022    Assessment: Shakila Kapoor is a 57 year old female  S/p Anterior C4 to C6 diskectomy and fusion on 12/20/2022 with Dr. Be Solorzano.      Clinically Significant Risk Factors Present on Admission            # Obesity: Estimated body mass index is 32.69 kg/m  as calculated from the following:    Height as of this encounter: 1.651 m (5' 5\").    Weight as of this encounter: 89.1 kg (196 lb 6.9 oz).        Plan:  - Neuro checks/vital signs: Q4 hours   - SBP: <160  - Pain control: PRN oxycodone, scheduled tylenol, robaxin  - Activity: up with assist  - Restrictions/Bracing: C-Collar at all times   - Diet: regular   - Drain: Hemovac  -  ABX:  Clindamycin while drain in   - DVT prophylaxis: SCDs  - Imaging:  Upright x-rays  - PT/OT: pending        CARLOS Feliciano, CNP  12/21/2022  Department of Neurosurgery  Pager: [phone]    I have spent a total of 25 minutes total time counseling, coordination, and chart review, over 50% of the time was spent in direct patient care.       Interval History:  OR yesterday for ACDF.  Complains of posterior neck.  Has not been OOB since surgery.            Objective:   Temp:  [97.9  F (36.6  C)-98.7  F (37.1  C)] 98.6  F (37  C)  Pulse:  [102-120] 106  Resp:  [12-23] 14  BP: (121-185)/() 145/94  SpO2:  [93 %-99 %] 96 %  I/O last 3 completed shifts:  In: 1487 [I.V.:1487]  Out: 2317 [Urine:2297; Blood:20]    Gen: Appears comfortable, NAD  Wound: Incision, clean, dry, intact without strikethrough  Neurologic:  - Alert & Oriented to person, place, time, and situation  - Follows commands briskly  - Speech fluent, spontaneous. No aphasia or dysarthria.  - No gaze preference. No apparent hemineglect.  - PERRL, EOMI  - Strong eye closure, jaw clench, and cheek puff  - Face symmetric with sensation intact to light touch  - Palate elevates symmetrically, uvula midline, tongue " protrudes midline  - Trapezii and sternocleidomastoid muscles 5/5 bilaterally  - No pronator drift     Del Tr Bi WE WF Gr   R 5 5 5 5 5 5   L 5 5 5 5 5 5    HF KE KF DF PF EHL   R 5 5 5 5 5 5   L 5 5 5 5 5 5     Reflexes 2+ throughout    Sensation intact and symmetric to light touch throughout    LABS  Recent Labs   Lab Test 12/21/22  0736 11/10/22  1459 05/05/22  1107   WBC 10.9 6.8 5.4   HGB 13.4 15.4 15.1   MCV 92 90 90    284 258       Recent Labs   Lab Test 12/20/22  0632 05/25/22  0817 05/05/22  1107 09/01/21  1650   NA  --   --  141 142   POTASSIUM  --   --  4.3 4.3   CHLORIDE  --   --  106 108   CO2  --   --  28 30   BUN  --   --  9 12   CR  --   --  0.80 0.68   ANIONGAP  --   --  7 4   ANOOP  --   --  9.0 9.0   GLC 92 88 172* 95       IMAGING    Recent Results (from the past 24 hour(s))   XR Surgery KVNG Fluoro Less Than 5 Min w Stills    Narrative    This exam was marked as non-reportable because it will not be read by a   radiologist or a [facility] non-radiologist provider.

## 2022-12-21 NOTE — PLAN OF CARE
Pt POD#1 anterior C3-4 fusion, vss, neuros include: decreased vision of R eye, only can see shapes and shadows. PIV removed d/t it occluded, regular diet but reporting difficulty swallowing, drinking Ensure shakes, up SBA w/GB. Pt to wear her aspen collar @ all times, ortho came today and correctly fitted her, PRN pain meds provided with relief.  @ bedside and very supportive, pt worked with therapies. Pt is voiding with retention, RN bladder scanning after each void, has not required straight cath based on orders. Plan for discharge tomorrow, continue to care per orders.

## 2022-12-21 NOTE — DISCHARGE SUMMARY
"[facility] Discharge Summary and Instructions    Shakila Kapoor MRN# [id number]   Age: 57 year old YOB: 1965     Date of Admission:  12/20/2022  Date of Discharge::  12/23/2022  Admitting Physician:  Be Solorzano MD  Discharge Physician:  Be Solorzano MD          Admission Diagnoses:   Cervical spondylosis with myelopathy [M47.12]  S/P cervical spinal fusion [Z98.1]          Discharge Diagnosis:     Cervical spondylosis with myelopathy [M47.12]  S/P cervical spinal fusion [Z98.1]     Clinically Significant Risk Factors Present on Admission              # Obesity: Estimated body mass index is 32.43 kg/m  as calculated from the following:    Height as of this encounter: 1.651 m (5' 5\").    Weight as of this encounter: 88.4 kg (194 lb 14.4 oz).           Procedures:   12/20/2022  1. C4-5 diskectomy and decompression of spinal cord.  2. Placement of Trino Byers interbody cage at C4-5 for anterior arthrodesis.  3. C5-6 diskectomy and decompression of spinal cord.  4. Placement of Kramer Byers interbody cage at C5-6 for anterior arthrodesis.  5. Placement of 40mm K2M Pioneer cervical plate at C4-6.  6.  Use of intraoperative C-arm Fluoroscopy.  7.  Use of intraoperative neuromonitoring.  8.  Use of intraoperative microscope.           Brief History of Illness:   Ms. Kapoor is a 57-year-old female with a history of multiple sclerosis.  She presented to medical attention again with worsening balance and intermittent tingling involving her fingers and toes.  Cervical imaging demonstrated significant spondylosis at C4 to C5 and C5 to C6, worse at C5 to C6.  There is associated T2 signal cord change.  It is unclear if the patient's tingling in her toes and fingers was associated with cervical spondylosis or MS.  Regardless, the patient presented with symptoms of myelopathy with T2 signal change.  Given the patient's presenting symptoms as well as imaging findings, she was indicated for the " aforementioned procedure.  After thorough conversation of the risks and benefits of surgery, the patient elected to move forward with the offered surgical procedure.           Hospital Course:   Following surgery the patient was transferred to the general neurosurgical floor.  The patient complained of posterior neck and shoulder pain. Patient complained of sore throat, however did not display signs of dysphagia.    Patient was assessed by PT and OT who felt he was safe to discharge home with home therapies.    Post operative x-rays revealed proper positioning of surgical hardware.    Patient was tolerating regular diet, voiding without monge, passing flatus/bowel movements, and pain was controlled on oral analgesia.    Patient remained medically stable throughout course.   Patient was neurologically intact upon discharge.   Patient to continue collar until cleared by Neurosurgery.   Patient will follow up with Neurosurgery in 2 weeks for wound check.             Discharge Medications:     Current Discharge Medication List      START taking these medications    Details   methocarbamol (ROBAXIN) 500 MG tablet Take 1 tablet (500 mg) by mouth 4 times daily  Qty: 30 tablet, Refills: 0    Associated Diagnoses: S/P cervical spinal fusion      oxyCODONE (ROXICODONE) 5 MG tablet Take 1 tablet (5 mg) by mouth every 3 hours as needed for moderate pain (4-6)  Qty: 45 tablet, Refills: 0    Associated Diagnoses: S/P cervical spinal fusion      polyethylene glycol (MIRALAX) 17 g packet Take 17 g by mouth daily for 14 days  Qty: 14 packet, Refills: 0    Associated Diagnoses: S/P cervical spinal fusion      senna-docusate (SENOKOT-S/PERICOLACE) 8.6-50 MG tablet Take 1 tablet by mouth 2 times daily for 14 days  Qty: 28 tablet, Refills: 0    Associated Diagnoses: S/P cervical spinal fusion         CONTINUE these medications which have NOT CHANGED    Details   acetaminophen (TYLENOL) 325 MG tablet Take 325-650 mg by mouth every 6  hours as needed for mild pain      LORazepam (ATIVAN) 0.5 MG tablet Take 0.5-1 mg by mouth daily as needed      oxybutynin ER (DITROPAN XL) 10 MG 24 hr tablet TAKE 1 TABLET(10 MG) BY MOUTH DAILY  Qty: 30 tablet, Refills: 11    Associated Diagnoses: Neurogenic bladder      Ozanimod HCl (ZEPOSIA) 0.92 MG CAPS Take 0.92 mg by mouth daily  Qty: 30 capsule, Refills: 11    Associated Diagnoses: Multiple sclerosis (H)      PARoxetine (PAXIL) 30 MG tablet Take 30 mg by mouth every evening            Objective:   Temp:  [97.9  F (36.6  C)-98.7  F (37.1  C)] 98.6  F (37  C)  Pulse:  [102-120] 106  Resp:  [12-23] 14  BP: (121-185)/() 145/94  SpO2:  [93 %-99 %] 96 %  I/O last 3 completed shifts:  In: 1487 [I.V.:1487]  Out: 2317 [Urine:2297; Blood:20]     Gen: Appears comfortable, NAD  Wound: Incision, clean, dry, intact without strikethrough  Neurologic:  - Alert & Oriented to person, place, time, and situation  - Follows commands briskly  - Speech fluent, spontaneous. No aphasia or dysarthria.  - No gaze preference. No apparent hemineglect.  - PERRL, EOMI  - Strong eye closure, jaw clench, and cheek puff  - Face symmetric with sensation intact to light touch  - Palate elevates symmetrically, uvula midline, tongue protrudes midline  - Trapezii and sternocleidomastoid muscles 5/5 bilaterally  - No pronator drift       Del Tr Bi WE WF Gr   R 5 5 5 5 5 5   L 5 5 5 5 5 5     HF KE KF DF PF EHL   R 5 5 5 5 5 5   L 5 5 5 5 5 5      Reflexes 2+ throughout     Sensation intact and symmetric to light touch throughout              Discharge Instructions and Follow-Up:     Discharge diet: Regular   Discharge activity: You may advance activity as tolerated. No strenuous exercise or heay lifting greater than 10 lbs for 4 weeks or until seen and cleared in clinic.    Patient to wear cervical collar at all times.     Discharge follow-up:   Follow-up Dr. Be Solorzano MD on 1/06/2023, all additional follow-up visits to be determined by   Be Solorzano MD       Wound care: Ok to shower,however no scrubbing of the wound and no soaking of the wound, meaning no bathtubs or swimming pools. Pat dry only. Leave wound open to air.  Patient to have wound checked 2 weeks following surgery.    Wound location: anterior cervical   Closure technique: monocryl  Dressing needs: none  Post-op care at follow-up: Keep dry and clean             Please call if you have:  1. increased pain, redness, drainage, swelling at your incision  2. fevers > 101.5 F degrees  3. with any questions or concerns.  You may reach the Neurosurgery clinic at [phone] during regular work hours. ER at [phone].    and ask for the Neurosurgery Resident on call at [phone], during off hours or weekends.         Discharge Disposition:     Discharged to home        CARLOS Feliciano, CNP  Department of Neurosurgery  Pager: [phone]    CARLOS Bowling, CNP  Neurosurgery  Pager [phone]

## 2022-12-21 NOTE — PROGRESS NOTES
S: Pt seen at [facility] room 5332-02 in good spirits. O: I see the EPIC order for the [place] J collar due to post op. A: The patient was fit with a size 300 short [place] J collar and the extra pad set was put on the table. Instructions were given and seemed to be understood P: FU PRN. G: The goal is to restrict head/neck motion during healing.  Electronically signed Neri Contreras , LPO.

## 2022-12-21 NOTE — PROVIDER NOTIFICATION
FYI: [id number] RINKU Kapoor    Straight cathed patient again @ 7518 for 350    thanks, Raina - call 6A desk

## 2022-12-21 NOTE — PLAN OF CARE
Arrived from: PACU  Belongings/meds: clothing, phone ,   2 RN Skin Assessment Completed by: Raina GUZMAN RN and Olman PRINCE RN   Non-intact findings documented (yes/no/NA): cut on L outer shin covered with dressing, anterior neck incision no drainage with slight erythema, dry and flaky heels, scab on back    Status: pt s/p anterior cervical 3-4 neck fusion  Vitals: HTN, given hydralazine x1, 2L NC, tachy to 120, Mds aware  Neuros: AOx4, 5/5 strengths, R eye no vision but reactive to light. Pt reported R eye increased blurriness at 2000 (Mds aware),   IV: PIV x1 infusing NS @ 100ml/hr   Resp/trach: LSC  Diet: regular  Bowel status: LBM 12/18  : DTV after monge pulled in case, bladder scanned at 1800 for 518ml and straight cath'd at 1900 for 600ml. Bladder scanned at 2300 for 322 ml and straight cath'd for 350ml. Orders changed to cath >500ml  Skin: see above  Pain: managed with scheduled tylenol, and PRN oxy and dilaudid  Activity: A1 GB  Social: significant other at bedside  Plan: continue to manage pain and follow up with voiding ability

## 2022-12-21 NOTE — PHARMACY-ADMISSION MEDICATION HISTORY
Admission Medication History Completed by Pharmacy    See [facility] Admission Navigator for allergy information, preferred outpatient pharmacy, prior to admission medications and immunization status.     Medication History Sources:     Patient, sure scripts, chart review    Patient is reliable     Changes made to PTA medication list (reason):    Added: None    Deleted: None    Changed: Updated lorazepam instructions: 1-2 tablets (0.5-1 mg) PO every day PRN    Additional Information:    None    Prior to Admission medications    Medication Sig Last Dose Taking? Auth Provider Long Term End Date   acetaminophen (TYLENOL) 325 MG tablet Take 325-650 mg by mouth every 6 hours as needed for mild pain Past Month Yes Reported, Patient     LORazepam (ATIVAN) 0.5 MG tablet Take 0.5-1 mg by mouth daily as needed 12/19/2022 at 2100 Yes Reported, Patient     oxybutynin ER (DITROPAN XL) 10 MG 24 hr tablet TAKE 1 TABLET(10 MG) BY MOUTH DAILY  Patient taking differently: 10 mg At Bedtime 12/19/2022 at 2100 Yes Daniel Harris MD     Ozanimod HCl (ZEPOSIA) 0.92 MG CAPS Take 0.92 mg by mouth daily  Patient taking differently: Take 0.92 mg by mouth At Bedtime 12/19/2022 at 2100 Yes Daniel Harris MD     PARoxetine (PAXIL) 30 MG tablet Take 30 mg by mouth every evening 12/19/2022 at 2100 Yes Reported, Patient Yes        Date completed: 12/21/22    Medication history completed by: Anayeli Landin, NormaD

## 2022-12-21 NOTE — PROGRESS NOTES
12/21/22 0932   Appointment Info   Signing Clinician's Name / Credentials (OT) Esha Tovar, OTR/L   Rehab Comments (OT) c-collar on at all times   Living Environment   People in Home child(scottie), adult;spouse   Current Living Arrangements house   Home Accessibility stairs to enter home   Number of Stairs, Main Entrance 2   Transportation Anticipated car, drives self;family or friend will provide   Living Environment Comments Pt lives with  and adult children in a rambler, 2 ILEANA, all needs met on main level. Pt has a tub shower, grab bar, no shower chair.   Self-Care   Usual Activity Tolerance good   Current Activity Tolerance moderate   Regular Exercise No   Equipment Currently Used at Home grab bar, tub/shower   Fall history within last six months no   Activity/Exercise/Self-Care Comment Pt reports being IND at baseline w/ ADLs, functional mobility, though reports baseline balance/strength deficits d/t dx of MS.   Instrumental Activities of Daily Living (IADL)   Previous Responsibilities meal prep;housekeeping;laundry;shopping;yardwork;medication management;finances;driving;work   IADL Comments Pt reports /children able to assist PRN   General Information   Onset of Illness/Injury or Date of Surgery 12/20/22   Referring Physician Krissy Ramos PA-C   Patient/Family Therapy Goal Statement (OT) Return to PLOF   Additional Occupational Profile Info/Pertinent History of Current Problem Shakila Kapoor is a 57 year old female  S/p Anterior C4 to C6 diskectomy and fusion on 12/20/2022 with Dr. Be Solorzano.   Existing Precautions/Restrictions fall;spinal   Limitations/Impairments visual  (Blindness in R eye)   Left Upper Extremity (Weight-bearing Status) partial weight-bearing (PWB)  (10#)   Right Upper Extremity (Weight-bearing Status) partial weight-bearing (PWB)  (10#)   General Observations and Info Activity: Up w/ A, c-collar on at all times   Cognitive Status Examination   Orientation  Status orientation to person, place and time   Cognitive Status Comments No acute deficits identified   Visual Perception   Visual Impairment/Limitations corrective lenses for reading   Impact of Vision Impairment on Function (Vision) Pt reports blindness in R eye, baseline   Sensory   Sensory Quick Adds sensation intact   Pain Assessment   Patient Currently in Pain Yes, see Vital Sign flowsheet   Posture   Posture not impaired   Range of Motion Comprehensive   General Range of Motion bilateral upper extremity ROM WFL   Strength Comprehensive (MMT)   General Manual Muscle Testing (MMT) Assessment no strength deficits identified   Muscle Tone Assessment   Muscle Tone Quick Adds No deficits were identified   Coordination   Upper Extremity Coordination No deficits were identified   Transfers   Transfers sit-stand transfer   Sit-Stand Transfer   Sit-Stand [place] (Transfers) contact guard   Activities of Daily Living   BADL Assessment/Intervention lower body dressing;toileting;bathing;upper body dressing;grooming   Bathing Assessment/Intervention   [place] Level (Bathing) minimum assist (75% patient effort)   Comment, (Bathing) Per clinical judgment   Upper Body Dressing Assessment/Training   Comment, (Upper Body Dressing) Per clinical judgment   [place] Level (Upper Body Dressing) moderate assist (50% patient effort)   Lower Body Dressing Assessment/Training   [place] Level (Lower Body Dressing) supervision;verbal cues   Grooming Assessment/Training   [place] Level (Grooming) set up;verbal cues   Comment, (Grooming) Per clinical judgment   Toileting   [place] Level (Toileting) contact guard assist   Clinical Impression   Criteria for Skilled Therapeutic Interventions Met (OT) Yes, treatment indicated   OT Diagnosis Decreased ADL/IADL I   OT Problem List-Impairments impacting ADL problems related to;activity tolerance impaired;strength;pain;post-surgical precautions   Assessment of  Occupational Performance 5 or more Performance Deficits   Identified Performance Deficits Dressing, bathing, toileting, g/h, home mgmt, functional mobility/transfers   Planned Therapy Interventions (OT) ADL retraining;IADL retraining;strengthening;transfer training;home program guidelines;progressive activity/exercise;risk factor education   Clinical Decision Making Complexity (OT) moderate complexity   Anticipated Equipment Needs Upon Discharge (OT) other (see comments)  (TBD)   Risk & Benefits of therapy have been explained evaluation/treatment results reviewed;care plan/treatment goals reviewed;current/potential barriers reviewed;risks/benefits reviewed;participants voiced agreement with care plan;participants included;patient   Clinical Impression Comments Pt will benefit from skilled OT services to progress IND w/ ADLs/IADLs and support returnt to PLOF   OT Total Evaluation Time   OT Eval, Moderate Complexity Minutes (10748) 10   OT Goals   Therapy Frequency (OT) 6 times/wk   OT Predicted Duration/Target Date for Goal Attainment 01/13/23   OT Goals Hygiene/Grooming;Upper Body Dressing;Lower Body Dressing;Lower Body Bathing;Toilet Transfer/Toileting;Home Management   OT: Hygiene/Grooming supervision/stand-by assist;within precautions   OT: Upper Body Dressing Minimal assist;using adaptive equipment;within precautions;including set-up/clothing retrieval   OT: Lower Body Dressing Modified independent;using adaptive equipment;within precautions;including set-up/clothing retrieval   OT: Lower Body Bathing Modified independent;using adaptive equipment;with precautions   OT: Toilet Transfer/Toileting Supervision/stand-by assist;toilet transfer;cleaning and garment management;using adaptive equipment;within precautions   OT: Home Management Supervision/stand-by assist;with light demand household tasks;within precautions;ambulatory level   Interventions   Interventions Quick Adds Therapeutic Activity;Self-Care/Home  Management   Self-Care/Home Management   Self-Care/Home Mgmt/ADL, Compensatory, Meal Prep Minutes (66519) 8   Therapeutic Activities   Therapeutic Activity Minutes (73735) 10   OT Discharge Planning   OT Plan OT: Standing ADLs, review precautions, c-collar donning/doffing   OT Discharge Recommendation (DC Rec) home with home care occupational therapy   OT Rationale for DC Rec Pt presents below baseline, limited by post-op pain, deconditioning/weakness and new precautions. Pt reports good support from family at home, able to have 24 hr assist if needed for completion of ADLs/IADLs and mobility. Anticipate pt will be safe to d/c home w/ A when medically ready, may benefit from  OT pending progress. Requested PT eval.   OT Brief overview of current status CGA w/ IV pole, min A no AD   Total Session Time   Timed Code Treatment Minutes 18   Total Session Time (sum of timed and untimed services) 28

## 2022-12-22 ENCOUNTER — APPOINTMENT (OUTPATIENT)
Dept: PHYSICAL THERAPY | Facility: CLINIC | Age: 57
DRG: 473 | End: 2022-12-22
Attending: NEUROLOGICAL SURGERY
Payer: COMMERCIAL

## 2022-12-22 PROCEDURE — 250N000013 HC RX MED GY IP 250 OP 250 PS 637: Performed by: STUDENT IN AN ORGANIZED HEALTH CARE EDUCATION/TRAINING PROGRAM

## 2022-12-22 PROCEDURE — 250N000013 HC RX MED GY IP 250 OP 250 PS 637

## 2022-12-22 PROCEDURE — 250N000013 HC RX MED GY IP 250 OP 250 PS 637: Performed by: NURSE PRACTITIONER

## 2022-12-22 PROCEDURE — 120N000002 HC R&B MED SURG/OB UMMC

## 2022-12-22 PROCEDURE — 97116 GAIT TRAINING THERAPY: CPT | Mod: GP

## 2022-12-22 PROCEDURE — 97530 THERAPEUTIC ACTIVITIES: CPT | Mod: GP

## 2022-12-22 RX ORDER — METHOCARBAMOL 750 MG/1
750 TABLET, FILM COATED ORAL EVERY 6 HOURS
Qty: 30 TABLET | Refills: 0 | Status: SHIPPED | OUTPATIENT
Start: 2022-12-22 | End: 2023-02-22

## 2022-12-22 RX ORDER — PAROXETINE 30 MG/1
30 TABLET, FILM COATED ORAL EVERY EVENING
Status: DISCONTINUED | OUTPATIENT
Start: 2022-12-22 | End: 2022-12-23 | Stop reason: HOSPADM

## 2022-12-22 RX ORDER — HYDRALAZINE HYDROCHLORIDE 10 MG/1
10 TABLET, FILM COATED ORAL 4 TIMES DAILY PRN
Status: DISCONTINUED | OUTPATIENT
Start: 2022-12-22 | End: 2022-12-23 | Stop reason: HOSPADM

## 2022-12-22 RX ORDER — POLYETHYLENE GLYCOL 3350 17 G/17G
17 POWDER, FOR SOLUTION ORAL 3 TIMES DAILY
Status: DISCONTINUED | OUTPATIENT
Start: 2022-12-22 | End: 2022-12-23 | Stop reason: HOSPADM

## 2022-12-22 RX ORDER — DIAZEPAM 2 MG
2 TABLET ORAL EVERY 4 HOURS PRN
Status: DISCONTINUED | OUTPATIENT
Start: 2022-12-22 | End: 2022-12-22

## 2022-12-22 RX ORDER — AMOXICILLIN 250 MG
2 CAPSULE ORAL 2 TIMES DAILY
Status: DISCONTINUED | OUTPATIENT
Start: 2022-12-22 | End: 2022-12-23 | Stop reason: HOSPADM

## 2022-12-22 RX ORDER — BISACODYL 10 MG
10 SUPPOSITORY, RECTAL RECTAL ONCE
Status: COMPLETED | OUTPATIENT
Start: 2022-12-22 | End: 2022-12-22

## 2022-12-22 RX ORDER — DIAZEPAM 2 MG
2 TABLET ORAL EVERY 4 HOURS PRN
Status: DISCONTINUED | OUTPATIENT
Start: 2022-12-22 | End: 2022-12-23 | Stop reason: HOSPADM

## 2022-12-22 RX ORDER — HYDRALAZINE HYDROCHLORIDE 20 MG/ML
10 INJECTION INTRAMUSCULAR; INTRAVENOUS EVERY 6 HOURS PRN
Status: DISCONTINUED | OUTPATIENT
Start: 2022-12-22 | End: 2022-12-23 | Stop reason: HOSPADM

## 2022-12-22 RX ORDER — OXYBUTYNIN CHLORIDE 10 MG/1
10 TABLET, EXTENDED RELEASE ORAL AT BEDTIME
Status: DISCONTINUED | OUTPATIENT
Start: 2022-12-22 | End: 2022-12-23 | Stop reason: HOSPADM

## 2022-12-22 RX ORDER — ACETAMINOPHEN 325 MG/1
650 TABLET ORAL EVERY 4 HOURS PRN
Status: DISCONTINUED | OUTPATIENT
Start: 2022-12-22 | End: 2022-12-23 | Stop reason: HOSPADM

## 2022-12-22 RX ORDER — ACETAMINOPHEN 325 MG/1
650 TABLET ORAL EVERY 4 HOURS PRN
Qty: 30 TABLET | Refills: 0 | Status: SHIPPED | OUTPATIENT
Start: 2022-12-22 | End: 2023-02-22

## 2022-12-22 RX ORDER — HYDRALAZINE HYDROCHLORIDE 10 MG/1
10 TABLET, FILM COATED ORAL 4 TIMES DAILY PRN
Status: DISCONTINUED | OUTPATIENT
Start: 2022-12-22 | End: 2022-12-22

## 2022-12-22 RX ORDER — METHOCARBAMOL 750 MG/1
750 TABLET, FILM COATED ORAL 4 TIMES DAILY
Status: DISCONTINUED | OUTPATIENT
Start: 2022-12-22 | End: 2022-12-22

## 2022-12-22 RX ORDER — METHOCARBAMOL 750 MG/1
750 TABLET, FILM COATED ORAL EVERY 6 HOURS
Status: DISCONTINUED | OUTPATIENT
Start: 2022-12-22 | End: 2022-12-23 | Stop reason: HOSPADM

## 2022-12-22 RX ADMIN — DIAZEPAM 2 MG: 2 TABLET ORAL at 16:49

## 2022-12-22 RX ADMIN — OXYCODONE HYDROCHLORIDE 10 MG: 10 TABLET ORAL at 05:48

## 2022-12-22 RX ADMIN — OXYCODONE HYDROCHLORIDE 10 MG: 10 TABLET ORAL at 15:02

## 2022-12-22 RX ADMIN — SENNOSIDES AND DOCUSATE SODIUM 2 TABLET: 50; 8.6 TABLET ORAL at 08:12

## 2022-12-22 RX ADMIN — PAROXETINE 30 MG: 30 TABLET, FILM COATED ORAL at 20:59

## 2022-12-22 RX ADMIN — ACETAMINOPHEN 650 MG: 325 TABLET, FILM COATED ORAL at 20:59

## 2022-12-22 RX ADMIN — OXYCODONE HYDROCHLORIDE 10 MG: 10 TABLET ORAL at 19:27

## 2022-12-22 RX ADMIN — POLYETHYLENE GLYCOL 3350 17 G: 17 POWDER, FOR SOLUTION ORAL at 08:12

## 2022-12-22 RX ADMIN — OXYCODONE HYDROCHLORIDE 10 MG: 10 TABLET ORAL at 00:38

## 2022-12-22 RX ADMIN — OXYBUTYNIN CHLORIDE 10 MG: 10 TABLET, EXTENDED RELEASE ORAL at 21:36

## 2022-12-22 RX ADMIN — ACETAMINOPHEN 975 MG: 325 TABLET, FILM COATED ORAL at 08:12

## 2022-12-22 RX ADMIN — ACETAMINOPHEN 650 MG: 325 TABLET, FILM COATED ORAL at 12:16

## 2022-12-22 RX ADMIN — METHOCARBAMOL 750 MG: 750 TABLET, FILM COATED ORAL at 15:40

## 2022-12-22 RX ADMIN — METHOCARBAMOL 750 MG: 750 TABLET, FILM COATED ORAL at 02:12

## 2022-12-22 RX ADMIN — MAGNESIUM HYDROXIDE 30 ML: 400 SUSPENSION ORAL at 12:16

## 2022-12-22 RX ADMIN — METHOCARBAMOL 750 MG: 750 TABLET, FILM COATED ORAL at 21:36

## 2022-12-22 RX ADMIN — OXYCODONE HYDROCHLORIDE 10 MG: 10 TABLET ORAL at 10:15

## 2022-12-22 RX ADMIN — BISACODYL 10 MG: 10 SUPPOSITORY RECTAL at 12:16

## 2022-12-22 RX ADMIN — ACETAMINOPHEN 975 MG: 325 TABLET, FILM COATED ORAL at 00:38

## 2022-12-22 RX ADMIN — METHOCARBAMOL 750 MG: 750 TABLET, FILM COATED ORAL at 08:12

## 2022-12-22 RX ADMIN — SENNOSIDES AND DOCUSATE SODIUM 2 TABLET: 50; 8.6 TABLET ORAL at 20:59

## 2022-12-22 ASSESSMENT — ACTIVITIES OF DAILY LIVING (ADL)
ADLS_ACUITY_SCORE: 26
DEPENDENT_IADLS:: INDEPENDENT
ADLS_ACUITY_SCORE: 26
ADLS_ACUITY_SCORE: 26

## 2022-12-22 NOTE — PROGRESS NOTES
IP PT Evaluation:     Boni Baron PT  Pager #[phone]     12/21/22 3476   Appointment Info   Signing Clinician's Name / Credentials (PT) Boni Baron PT, DPT   Rehab Comments (PT) C-collar at all times, spine precautions   Living Environment   Transportation Anticipated car, drives self;family or friend will provide   Living Environment Comments Pt lives with spouse and 2 adult children in a rambler-style home. Also has 2 dogs. 2 ILEANA without railings. Tub shower.   Self-Care   Equipment Currently Used at Home grab bar, tub/shower   Fall history within last six months no   Activity/Exercise/Self-Care Comment IND with ADLs and mobility though reports mild strength/balance deficits at baseline due to hx of MS.   General Information   Onset of Illness/Injury or Date of Surgery 12/20/22   Referring Physician Krissy Ramos, PAGERSON   Patient/Family Therapy Goals Statement (PT) To get home   Pertinent History of Current Problem (include personal factors and/or comorbidities that impact the POC) Shakila Kapoor is a 57 year old female s/p anterior C4 to C6 diskectomy and fusion on 12/20/2022 with Dr. Be Solorzano.   Existing Precautions/Restrictions spinal;other (see comments)  (C-collar at all times)   Weight-Bearing Status - LUE partial weight-bearing (% in comments)   Weight-Bearing Status - RUE partial weight-bearing (% in comments)   General Observations Pt supine, pleasant, agreeable to session. Spouse present and supportive.   Cognition   Affect/Mental Status (Cognition) WNL   Follows Commands (Cognition) WNL   Pain Assessment   Patient Currently in Pain   (Surgical site pain)   Posture    Posture Forward head position;Protracted shoulders   Range of Motion (ROM)   Range of Motion ROM deficits secondary to surgical procedure   ROM Comment BLE WFL   Strength (Manual Muscle Testing)   Strength Comments Not formally tested 2/2 procedure/precaution; moves all extremities against gravity.   Bed Mobility    Comment, (Bed Mobility) IND   Transfers   Comment, (Transfers) IND   Gait/Stairs (Locomotion)   Comment, (Gait/Stairs) SBA, 2UE on IV pole   Balance   Balance Comments IND sitting, SBA static/dynamic stance with UE support   Sensory Examination   Sensory Perception Comments No new symptoms reported   Clinical Impression   Criteria for Skilled Therapeutic Intervention Yes, treatment indicated   PT Diagnosis (PT) Impaired functional mobility s/p anterior C4 to C6 diskectomy and fusion   Influenced by the following impairments Deconditioning, hx of MS, spine precautions, pain   Functional limitations due to impairments Activity tolerance, self cares, balance, gait   Clinical Presentation (PT Evaluation Complexity) Stable/Uncomplicated   Clinical Presentation Rationale Clinical judgment   Clinical Decision Making (Complexity) low complexity   Planned Therapy Interventions (PT) balance training;gait training;bed mobility training;home exercise program;patient/family education;stair training;strengthening;transfer training;progressive activity/exercise;risk factor education;home program guidelines   Anticipated Equipment Needs at Discharge (PT) walker, standard   Risk & Benefits of therapy have been explained evaluation/treatment results reviewed;care plan/treatment goals reviewed;risks/benefits reviewed;current/potential barriers reviewed;participants voiced agreement with care plan;participants included;patient;spouse/significant other   PT Total Evaluation Time   PT Eval, Low Complexity Minutes (43045) 8   Physical Therapy Goals   PT Frequency 5x/week   PT Predicted Duration/Target Date for Goal Attainment 12/28/22   PT Goals Gait;Stairs   PT: Gait Modified independent;Assistive device;Standard walker;Greater than 200 feet   PT: Stairs Independent;2 stairs   PT Discharge Planning   PT Plan Progress gait with FWW, balance, stairs, issue DME at dc   PT Discharge Recommendation (DC Rec) home with assist;home with home  care physical therapy;home with outpatient physical therapy   PT Rationale for DC Rec Pt mobilizing well postoperatively though presents with baseline strength/balance deficits 2/2 MS. Anticipate steady progress to enable discharge home with prn assist from spouse and HH/OP PT.   PT Brief overview of current status Ax1 with FWW, ambulate halls with family/staff as able   Total Session Time   Total Session Time (sum of timed and untimed services) 8

## 2022-12-22 NOTE — PROVIDER NOTIFICATION
"\"NON-URGENT ALLYSON Kapoor rm 212-1 6A NSG    Pt reporting neck pain 8/10, will give oxy/Tylenol when able, but pt requesting another muscle relaxer. Can she have flexeril or valium? Thx Judson [id number]\"        Paged neurosurgery resident on call at [phone]      Addendum: MD BRIE Francois called back and planned to increase dose of robaxin.     "

## 2022-12-22 NOTE — CONSULTS
Care Management Initial Consult    General Information  Assessment completed with: Shakila Mccain  Type of CM/SW Visit: Initial Assessment    Primary Care Provider verified and updated as needed: Yes   Readmission within the last 30 days:     Reason for Consult: discharge planning  Advance Care Planning:         Communication Assessment  Patient's communication style: spoken language (English or Bilingual)    Hearing Difficulty or Deaf: no   Wear Glasses or Blind: yes    Cognitive  Cognitive/Neuro/Behavioral: WDL  Level of Consciousness: alert  Arousal Level: opens eyes spontaneously  Orientation: oriented x 4     Best Language: 0 - No aphasia  Speech: clear, logical, spontaneous    Living Environment:   People in home: spouse, child(scottie), adult  Kumar  Current living Arrangements: house      Able to return to prior arrangements: yes     Family/Social Support:  Care provided by: self  Provides care for: no one  Marital Status:   , Children  Kumar       Description of Support System: Involved, Supportive       Current Resources:   Patient receiving home care services: No     Community Resources: None  Equipment currently used at home: none  Supplies currently used at home: None    Functional Status:  Prior to admission patient needed assistance:   Dependent ADLs:: Independent  Dependent IADLs:: Independent    Additional Information:  Patient status reviewed, discussed in discharge rounds. Patient likely medically ready for discharge tomorrow. PT/OT recommend home with home health.    Met with patient to complete initial assessment and discuss discharge planning. Patient currently lives in a house in [place] with her spouse and two adult children. She denies assistive devices at baseline, is independent with ADLs/IADLs. She denies current home or community services. Family is involved and supportive, able to provide 24/7 assist at discharge.     Discussed recommendation for home care. Patient  agrees with referrals being sent, does not have a preference on home care agency. Referrals initiated as below.    Pending Home Care Referrals:  [facility] (ph:[phone] fx:[phone]) - left message and faxed documents  [facility] (ph:[phone] fx: [phone]) - left message and documents faxed  [facility] (ph:[phone] fx:[phone]) - left message and documents faxed  [facility] (ph:[phone] fx:[phone]) - left message and documents faxed  [facility] (ph:[phone] fx:[phone]) - left message and documents faxed      Home Care Agencies Unable to Accept:  [facility] (Ph: [phone] Fax: [phone]) - unable to accept  [facility] ([phone] fx:[phone]) - out of insurance network  [facility] (ph:[phone] fx:[phone]) - capped w/ pt's insurance  [facility] (ph:[phone] fx: [phone]) - not accepting new referrals  [facility] (ph:[phone] fx:[phone]) - at capacity in pt's area  [facility] (ph:[phone] fx: [phone]) - at capacity  [facility] ([phone] fx:[phone]) - at capacity  [facility] (ph:[phone] fx: [phone]) - out of insurance network   [facility] (ph:[phone] fx:[phone]) - no therapy available at this time  [facility] (ph: [phone] fx:[phone]) - outside service area  [facility] ([phone] fx:[phone]) - at [facility] (ph:[phone] fx:[phone]) - unable to accept due to insurance  [facility] (ph:[phone] fx:[phone]) - unable to accept due to pt's insurance  [facility] (ph:[phone] fx:[phone]) - unable to accept referrals at this time  [facility] (ph:[phone] fx:[phone]) - no therapy at this time  [facility] (ph:[phone] fx:[phone]) - [facility]  (ph:[phone] fx:[phone]) - out of service area  [facility] (ph:[phone] fx:[phone]) - unable to accept due to staffing  [facility] (ph:[phone] fx: [phone]) - no PT/OT available at this time  [facility] (Ph: [phone] fx:[phone]) - unable to accept pt's insurnace    1514 Addendum:  Attempted to call [facility], [facility], [facility], [facility] and [facility] to follow up on referrals sent earlier in the day. Was unable to reach any of the above agencies, left voicemails requesting calls back.    Liliana Walker, RN, BSN  6A RN Care Coordinator  Ph: [phone]   Pager: [phone]

## 2022-12-22 NOTE — PROGRESS NOTES
"[facility] [place]   Neurosurgery Progress Note:    Date of service: 12/22/2022    Assessment: Shakila Kapoor is a 57 year old female  S/p Anterior C4 to C6 diskectomy and fusion on 12/20/2022 with Dr. Be Solorzano.      Clinically Significant Risk Factors Present on Admission            # Obesity: Estimated body mass index is 32.43 kg/m  as calculated from the following:    Height as of this encounter: 1.651 m (5' 5\").    Weight as of this encounter: 88.4 kg (194 lb 14.4 oz).      Plan:  - Neuro checks/vital signs: Q4 hours   - SBP: <160  - Pain control: PRN oxycodone, scheduled tylenol, robaxin  - Activity: up with assist  - Restrictions/Bracing: C-Collar at all times   - Diet: regular   - DVT prophylaxis: SCDs  - Bowel Regimen     CARLOS Bowling, CNP  Neurosurgery  Pager [phone]      I have spent a total of 25 minutes total time counseling, coordination, and chart review, over 50% of the time was spent in direct patient care.       Interval History:  Continues to report neck pain controlled with pain medications.  Reports tolerating PO intake well.  No BM yet, will adjust bowel regimen today.  Patient needs to increase activity as able today.  Will likely be ready to discharge later today or tomorrow.       Objective:   Temp:  [97.5  F (36.4  C)-99.2  F (37.3  C)] 99.2  F (37.3  C)  Pulse:  [104-105] 104  Resp:  [16] 16  BP: (137-168)/(85-99) 154/86  SpO2:  [96 %-98 %] 97 %  I/O last 3 completed shifts:  In: 990 [P.O.:990]  Out: 2195 [Urine:2195]    Gen: Appears comfortable, NAD  Wound: Incision, clean, dry, intact without strikethrough  Neurologic:  - Alert & Oriented to person, place, time, and situation  - Follows commands briskly  - Speech fluent, spontaneous. No aphasia or dysarthria.  - No gaze preference. No apparent hemineglect.  - PERRL, EOMI  - Strong eye closure, jaw clench, and cheek puff  - Face symmetric with sensation intact to light touch  - Palate elevates " symmetrically, uvula midline, tongue protrudes midline  - Trapezii and sternocleidomastoid muscles 5/5 bilaterally  - No pronator drift     Del Tr Bi WE WF Gr   R 5 5 5 5 5 5   L 5 5 5 5 5 5    HF KE KF DF PF EHL   R 5 5 5 5 5 5   L 5 5 5 5 5 5     Reflexes 2+ throughout    Sensation intact and symmetric to light touch throughout    LABS  Recent Labs   Lab Test 12/21/22  0736 11/10/22  1459 05/05/22  1107   WBC 10.9 6.8 5.4   HGB 13.4 15.4 15.1   MCV 92 90 90    284 258       Recent Labs   Lab Test 12/21/22  0736 12/20/22  0632 05/25/22  0817 05/05/22  1107 09/01/21  1650     --   --  141 142   POTASSIUM 3.7  --   --  4.3 4.3   CHLORIDE 107  --   --  106 108   CO2 23  --   --  28 30   BUN 14.3  --   --  9 12   CR 0.60  --   --  0.80 0.68   ANIONGAP 10  --   --  7 4   ANOOP 8.3*  --   --  9.0 9.0   * 92 88 172* 95       IMAGING    Recent Results (from the past 24 hour(s))   XR Surgery KVNG Fluoro Less Than 5 Min w Stills    Narrative    This exam was marked as non-reportable because it will not be read by a   radiologist or a [place] non-radiologist provider.

## 2022-12-22 NOTE — PLAN OF CARE
Status: pt s/p anterior cervical 3-4 neck fusion  Vitals: Tachy to low 100s, OVSS on RA  Neuros: AOx4, 5/5 strengths, R eye no vision only shapes and shadows but reactive to light.  IV: no PIV ok per MD  Resp/trach: LSC  Diet: regular  Bowel status: LBM 12/18, bowle meds given  : pt voiding intermittently and measured, bladder scanned after each void. Cath'd at 1900 for 600ml, bladder scanned after for 67, rescanned at 2230 for 460. Straight cath for over 500  Skin: anterior neck incision without edema  Pain: per pt unmanaged with scheduled tylenol and robaxin and PRN oxy/ Writer paged MDs, awaiting reponse  Activity: SBA-A1 with GB and cervical collar when OOB, IND with significant other  Social: significant other at bedside  Plan: continue to manage pain and follow up with voiding ability

## 2022-12-22 NOTE — PLAN OF CARE
Pt POD#2 anterior C3-4 fusion, vss, neuros include: decreased vision of R eye, only can see shapes and shadows. PIV removed d/t it occluded, regular diet but reporting difficulty swallowing, drinking Ensure shakes, with fair PO intake, pt takes pills crushed w/apple sauce. PRN pain meds provided with relief,  @ bedside and very supportive, pt worked with therapies. Pt is voiding with retention, RN bladder scanning after each void, has not required straight cath based on order, bowel meds given with results. Plan for tentative discharge tomorrow pending urinary retention status. Continue to care per orders.

## 2022-12-22 NOTE — PLAN OF CARE
Status: s/p C4-6 ACDF  Vitals: HTN below parameters, Tachycardic 100's  Neuros: A/Ox4, R eye blind. Otherwise intact    IV: No PIV - MDs aware   Resp/trach: RA    Diet: Regular, needs pills crushed d/t discomfort when swallowing   Bowel status: LBM 12/18, suppository ordered  : baseline neurogenic bladder, voiding spontaneously. Bladder scan this AM was 400, no straight caths overnight   Skin: anterior incision w/o edema   Pain: Throat/neck pain managed with PO tylenol/oxycodone, increased robaxin dose with improvement in pain    Activity: up independently in room with , Ax1 overnight  Plan: Continue to monitor

## 2022-12-23 ENCOUNTER — APPOINTMENT (OUTPATIENT)
Dept: OCCUPATIONAL THERAPY | Facility: CLINIC | Age: 57
DRG: 473 | End: 2022-12-23
Attending: NEUROLOGICAL SURGERY
Payer: COMMERCIAL

## 2022-12-23 VITALS
DIASTOLIC BLOOD PRESSURE: 87 MMHG | OXYGEN SATURATION: 91 % | WEIGHT: 194.9 LBS | BODY MASS INDEX: 32.47 KG/M2 | SYSTOLIC BLOOD PRESSURE: 155 MMHG | RESPIRATION RATE: 16 BRPM | HEIGHT: 65 IN | TEMPERATURE: 98.1 F | HEART RATE: 104 BPM

## 2022-12-23 PROCEDURE — 250N000013 HC RX MED GY IP 250 OP 250 PS 637

## 2022-12-23 PROCEDURE — 97535 SELF CARE MNGMENT TRAINING: CPT | Mod: GO

## 2022-12-23 PROCEDURE — 250N000013 HC RX MED GY IP 250 OP 250 PS 637: Performed by: STUDENT IN AN ORGANIZED HEALTH CARE EDUCATION/TRAINING PROGRAM

## 2022-12-23 PROCEDURE — 250N000013 HC RX MED GY IP 250 OP 250 PS 637: Performed by: NURSE PRACTITIONER

## 2022-12-23 RX ORDER — DIAZEPAM 2 MG
2 TABLET ORAL EVERY 8 HOURS PRN
Qty: 12 TABLET | Refills: 0 | Status: SHIPPED | OUTPATIENT
Start: 2022-12-23 | End: 2023-02-22

## 2022-12-23 RX ADMIN — POLYETHYLENE GLYCOL 3350 17 G: 17 POWDER, FOR SOLUTION ORAL at 08:04

## 2022-12-23 RX ADMIN — METHOCARBAMOL 750 MG: 750 TABLET, FILM COATED ORAL at 02:08

## 2022-12-23 RX ADMIN — METHOCARBAMOL 750 MG: 750 TABLET, FILM COATED ORAL at 08:04

## 2022-12-23 RX ADMIN — OXYCODONE HYDROCHLORIDE 10 MG: 10 TABLET ORAL at 09:29

## 2022-12-23 RX ADMIN — ACETAMINOPHEN 650 MG: 325 TABLET, FILM COATED ORAL at 09:29

## 2022-12-23 RX ADMIN — ACETAMINOPHEN 650 MG: 325 TABLET, FILM COATED ORAL at 04:48

## 2022-12-23 RX ADMIN — OXYCODONE HYDROCHLORIDE 10 MG: 10 TABLET ORAL at 04:48

## 2022-12-23 ASSESSMENT — ACTIVITIES OF DAILY LIVING (ADL)
ADLS_ACUITY_SCORE: 26

## 2022-12-23 NOTE — PLAN OF CARE
Status: pt s/p anterior cervical 3-4 neck fusion  Vitals: Tachy to low 100s, HTN 160s-70s self-resolved  Neuros: AOx4, 5/5 strengths, R eye no vision only shapes and shadows but reactive to light.  IV: no PIV ok per MD  Resp/trach: LSC  Diet: regular  Bowel status: LBM 12/21 senna given  : pt voiding intermittently and measured, bladder scanned after each void. Cath'd at 2100 for 650ml. Straight cath orders for over 500  Skin: anterior neck incision without edema  Pain: per pt managed with scheduled tylenol and robaxin and PRN oxy. Valium added, pt refused 2nd PRN dose  Activity: SBA-A1 with GB and cervical collar when OOB, IND with significant other  Social: significant other at bedside  Plan: continue to manage pain and follow up with voiding ability, possible discharge home tomorrow   Updates: persistent HTN 160s/70s but pt has no PIV, MD ordered PO hydralazine PRN

## 2022-12-23 NOTE — PLAN OF CARE
Status: s/p C4-6 ACDF  Vitals: HTN below parameters, Tachycardic 100's  Neuros: A/Ox4, R eye blind. Otherwise intact    IV: No PIV - MDs aware   Resp/trach: RA    Diet: Regular, needs pills crushed d/t discomfort when swallowing   Bowel status: LBM 12/22  : baseline neurogenic bladder, voiding spontaneously. Bladder scan this AM was 400, no straight caths overnight   Skin: anterior incision w/o edema   Pain: Throat/neck pain managed with PO tylenol/oxycodone, robaxin     Activity: up independently in room with , Ax1 overnight  Plan: Continue to monitor

## 2022-12-23 NOTE — PROVIDER NOTIFICATION
"\"nonurgent pt 6212-1 Antwon DOBBS BP has been 160/70s since 1500 after PRN and scheduled pain meds per MD for tx, pt has no PIV for hydralazine, what would you like given? Thanks Raina [id number]\"    Paged NSG web-based and in-house paging between 1730 and 1830    Addendum: MD HUGO Eisenberg placed order for PRN oral hydralazine  "

## 2022-12-23 NOTE — PLAN OF CARE
Physical Therapy Discharge Summary    Reason for therapy discharge:    Discharged to home with home therapy.    Progress towards therapy goal(s). See goals on Care Plan in [facility] electronic health record for goal details.  Goals partially met.  Barriers to achieving goals:   discharge from facility.    Therapy recommendation(s):    Continued therapy is recommended.  Rationale/Recommendations:  progress mobility to PLOF.

## 2022-12-23 NOTE — PLAN OF CARE
Occupational Therapy Discharge Summary    Reason for therapy discharge:    Discharged to home.    Progress towards therapy goal(s). See goals on Care Plan in [facility] electronic health record for goal details.  Goals met    Therapy recommendation(s):    Continue home exercise program.

## 2022-12-23 NOTE — PROGRESS NOTES
Care Management Discharge Note    Discharge Date: 12/23/2022     Discharge Disposition: Home   Discharge Services: Home Care  Discharge DME: None    Discharge Transportation: family or friend will provide    Private pay costs discussed: Not applicable    Education Provided on the Discharge Plan:  Yes  Persons Notified of Discharge Plans: Patient, family, provider  Patient/Family in Agreement with the Plan: Yes    Handoff Referral Completed: Yes    Additional Information:  Patient status reviewed, discussed in discharge rounds. Patient ready for discharge today. Informed provider RNCC was unsuccessful in securing home care, will likely need outpatient PT/OT orders for discharge.    Followed up on remaining home care referrals from yesterday: (see previous note for full list of agencies unable to accept)  [facility] (ph:[phone] fx:[phone]) - Multiple attempts were made yesterday to speak with intake staff without success, messages left. Attempted to call again this morning with no answer, per web site looks like they may be closed due to weather yesterday and today.  [facility] (ph:[phone] fx: [phone]) - no therapy available at this time  [facility] (ph:[phone] fx:[phone]) - unable to accept pt's insurnace  [facility] (ph:[phone] fx:[phone]) - left message stating they are unable to accept due to staffing  [facility] (ph:[phone] fx:[phone]) - Multiple attempts were made yesterday to speak with intake staff without success, messages left. Attempted to call again this morning with no answer.    Discussed inability to secure home care with PT/OT, they feel pending patient's ability to make it to appointments she should be appropriate for outpatient therapies.    Met with patient to discuss above information. Patient understanding and agrees with discharge plan, states will have rides to/from outpatient appointments.    Liliana Walker, RN, BSN  6A RN Care  Coordinator  Ph: [phone]   Pager: [phone]

## 2022-12-27 ENCOUNTER — PATIENT OUTREACH (OUTPATIENT)
Dept: CARE COORDINATION | Facility: CLINIC | Age: 57
End: 2022-12-27

## 2022-12-27 NOTE — PROGRESS NOTES
"  Clinic Care Coordination Contact  [facility]: Post-Discharge Note  SITUATION                                                      Admission:    Admission Date: 12/20/22   Reason for Admission: Cervical spondylosis with myelopathy  Discharge:   Discharge Date: 12/23/22  Discharge Diagnosis: Cervical spondylosis with myelopathy    BACKGROUND                                                      Per hospital discharge summary and inpatient provider notes:Ms. Kapoor is a 57-year-old female with a history of multiple sclerosis.  She presented to medical attention again with worsening balance and intermittent tingling involving her fingers and toes.  Cervical imaging demonstrated significant spondylosis at C4 to C5 and C5 to C6, worse at C5 to C6.  There is associated T2 signal cord change.  It is unclear if the patient's tingling in her toes and fingers was associated with cervical spondylosis or MS.  Regardless, the patient presented with symptoms of myelopathy with T2 signal change.  Given the patient's presenting symptoms as well as imaging findings, she was indicated for the aforementioned procedure.  After thorough conversation of the risks and benefits of surgery, the patient elected to move forward with the offered surgical procedure.      ASSESSMENT           Discharge Assessment  How are you doing now that you are home?: \" I am doing fine \"  How are your symptoms? (Red Flag symptoms escalate to triage hotline per guidelines): Improved  Do you feel your condition is stable enough to be safe at home until your provider visit?: Yes  Does the patient have their discharge instructions? : Yes  Does the patient have questions regarding their discharge instructions? : No  Were you started on any new medications or were there changes to any of your previous medications? : Yes  Does the patient have all of their medications?: Yes  Do you have questions regarding any of your medications? : No  Do you have all of " your needed medical supplies or equipment (DME)?  (i.e. oxygen tank, CPAP, cane, etc.): Yes  Discharge follow-up appointment scheduled within 14 calendar days? : Yes  Discharge Follow Up Appointment Date: 01/06/23  Discharge Follow Up Appointment Scheduled with?: Specialty Care Provider    Post-op ([facility] Only)  If the patient had a surgery or procedure, do they have any questions for a nurse?: No             PLAN                                                      Outpatient Plan: Follow-up Dr. Be Solorzano MD on 1/06/2023, all additional follow-up visits to be determined by Dr. Be Solorzano MD    Future Appointments   Date Time Provider Department Center   12/28/2022  8:15 AM Chelly Brooks, PT [facility]   1/3/2023  7:00 AM Bud Pierson, OTR [facility]   1/3/2023  7:45 AM Chelly Brooks, PT [facility]   1/6/2023  7:45 AM Chelly Brooks, PT [facility]   1/6/2023  9:15 AM Be Solorzano MD [facility]   1/10/2023  7:00 AM Chelly Brooks, PT [facility]   1/13/2023  7:45 AM Chelly Brooks, PT [facility]   4/12/2023 12:00 PM [facility] [facility]   4/12/2023  1:20 PM Abdulaziz Pride MD [facility]   5/11/2023  9:30 AM Daniel Harris MD [facility]         For any urgent concerns, please contact our 24 hour nurse triage line: [phone] ([phone])         Yareli Adan

## 2022-12-28 ENCOUNTER — THERAPY VISIT (OUTPATIENT)
Dept: PHYSICAL THERAPY | Facility: CLINIC | Age: 57
End: 2022-12-28
Attending: NURSE PRACTITIONER
Payer: COMMERCIAL

## 2022-12-28 DIAGNOSIS — Z98.1 S/P CERVICAL SPINAL FUSION: ICD-10-CM

## 2022-12-28 PROCEDURE — 97161 PT EVAL LOW COMPLEX 20 MIN: CPT | Mod: GP | Performed by: PHYSICAL THERAPIST

## 2022-12-28 PROCEDURE — 97530 THERAPEUTIC ACTIVITIES: CPT | Mod: GP | Performed by: PHYSICAL THERAPIST

## 2022-12-28 NOTE — PROGRESS NOTES
Physical Therapy Initial Evaluation  Subjective:    Patient Health History  Shakila Kapoor being seen for neck post surgery.     Problem began: 12/20/2022.   Problem occurred: disc fusion (C4-C5 and c5-c6)     General health as reported by patient is good.       Medical allergies: other. Other medical allergies details: penicillin.   Surgeries include:  Orthopedic surgery.    Current medications:  Anti-depressants and other. Other medications details: Zeposia for MS and Oxibutin for bladdar.    Current occupation is .   Primary job tasks include:  Computer work and driving.                  Therapist Generated HPI Evaluation  Problem details: Medical history: Multiple sclerosis     DOI:  December 2022    Brace: Wears constantly.         Type of problem:  Cervical spine.      Condition occurred with:  Insidious onset.  Where condition occurred: for unknown reasons.  Site of Pain:  left upper back region.  Pain is described as aching and is intermittent.  Pain radiates to:  None. Pain is worse in the A.M..  Since onset symptoms are gradually improving.  Associated with: nothing. Exacerbated by: Adls.  and relieved by rest and NSAID's.  Imaging testing: NONE.  Past treatment: na. There was none improvement following previous treatment.  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  Barriers include:  None as reported by patient.    Patient wears cervical brace for stabilization and has been instructed at all time.                    Objective:  System              Cervical/Thoracic Evaluation    Headaches: none  Cervical Myotomes:    C1-2 (Neck Flex): Left:  5      C3 (neck side bend): Left: 5      C4 (shrug):  Left: 5    Right: 5  C5 (Deltoid):  Left: 5    Right: 5  C6 (Biceps):  Left: 5    Right: 5  C7 (Triceps):  Left: 5    Right: 5  C8 (Thumb Ext): Left: 5    Right: 5  T1 (Intrinsics): Left: 5    Right: 5  DTR's:    C5 (Biceps):  Left:  3  Right:  3      C6 (Brachioradialis):  Left:  3  Right:  3     C7 (Triceps):  Left:  3  Right:  3    Cervical Dermatomes:            C5 left:  Normal-light touch     C5 right:  Normal-light touch    C6 left:  Normal-light touch     C6 right:  Normal-light touch    C7 left:  Normal-light touch     C7 right:  Normal-light touch    C8 left:  Normal-light touch     C8 right:  Normal-light touch    T1 left:  Normal-light touch     T1 right:  Normal-light touch  Cervical Palpation:  : Left upper trapezius.      Functional Tests:  not assessed    Cervical Stability/Joint Clearing:  not assessed        Cord Sign:  not assessed                                            General     ROS    Assessment/Plan:    Patient is a 57 year old female with cervical complaints.    Patient has the following significant findings with corresponding treatment plan.                Diagnosis 1:  Post cervical fusion  Pain -  hot/cold therapy, manual therapy, self management, education, directional preference exercise and home program  Decreased ROM/flexibility - manual therapy, therapeutic exercise, therapeutic activity and home program  Decreased joint mobility - manual therapy, therapeutic exercise, therapeutic activity and home program  Impaired balance - neuro re-education, therapeutic activities and home program  Impaired muscle performance - neuro re-education and home program  Decreased function - therapeutic activities and home program    Therapy Evaluation Codes:   1) Clinical presentation characteristics are:   Stable/Uncomplicated.  2) Decision-Making    Low complexity using standardized patient assessment instrument and/or measureable assessment of functional outcome.  Cumulative Therapy Evaluation is: Low complexity.    Previous and current functional limitations:  (See Goal Flow Sheet for this information)    Short term and Long term goals: (See Goal Flow Sheet for this information)     Communication ability:  Patient appears to be able to  clearly communicate and understand verbal and written communication and follow directions correctly.  Treatment Explanation - The following has been discussed with the patient:   RX ordered/plan of care  Anticipated outcomes  Possible risks and side effects  This patient would benefit from PT intervention to resume normal activities.   Rehab potential is good.    Frequency:  1 X week, once daily  Duration:  for 6 weeks  Discharge Plan:  Achieve all LTG.  Independent in home treatment program.  Reach maximal therapeutic benefit.    Please refer to the daily flowsheet for treatment today, total treatment time and time spent performing 1:1 timed codes.

## 2023-01-03 ENCOUNTER — THERAPY VISIT (OUTPATIENT)
Dept: OCCUPATIONAL THERAPY | Facility: CLINIC | Age: 58
End: 2023-01-03
Attending: NURSE PRACTITIONER
Payer: COMMERCIAL

## 2023-01-03 DIAGNOSIS — Z98.1 S/P CERVICAL SPINAL FUSION: ICD-10-CM

## 2023-01-03 PROCEDURE — 97165 OT EVAL LOW COMPLEX 30 MIN: CPT | Mod: GO

## 2023-01-03 ASSESSMENT — ACTIVITIES OF DAILY LIVING (ADL): IADL_QUICK_ADDS: MEAL PLANNING/PREPARATION;HOME/FINANCIAL/MANAGEMENT;COMMUNITY MOBILITY

## 2023-01-03 NOTE — PROGRESS NOTES
01/03/23 0651   Quick Adds   Type of Visit Initial Outpatient Occupational Therapy Evaluation       Present No   General Information   Start Of Care Date 01/03/23   Referring Physician Giorgio Ruth   Orders Evaluate and treat as indicated   Other Orders PT   Orders Date 12/23/22   Medical Diagnosis S/P cervical spinal fusion   Onset of Illness/Injury or Date of Surgery 12/20/22  (date of surgery)   Precautions/Limitations Weightbearing restrictions  (You may advance activity as tolerated. No strenuous exercise or heay lifting greater than 10 lbs for 4 weeks or until seen and cleared in clinic. Wear cervical collar at all times for 4 weeks post op)   Surgical/Medical History Reviewed Yes   Additional Occupational Profile Info/Pertinent History of Current Problem Shakila, age 57, presents today with her . She is a  eager to return to work following cervical spinal fusion surgery 12/20/22. PMHx MS, cervical spondylosis with myelopathy C4-C5 and C5-C6 with fusion   Comments/Observations PT addressing pain, shoulder overhead reaching   Role/Living Environment   Patient role/Employment history Employed  (Awaiting return to work clearance)   Current Living Environment House  (Lives wtih  and x2 adult children)   Home/Community Accessibility Comments Client reports no concerns with home accessibility, is IND in all ADLs and no equipment needs identified for self-cares or ambulatory activities   Prior Level - Transfers Independent   Prior Level - Ambulation Independent   Prior Level - ADLS Independent   Prior Responsibilities - IADL Meal Preparation;Housekeeping;Laundry;Shopping;Yardwork;Medication management;Finances;Driving;Work   Pain   Patient currently in pain Other   Pain location L shoulder   Pain rating 0   Pain comments Tightness in L shoulder, but improving since surgery, has been off pain meds for three days and managing well   Fall Risk Screen   Fall screen  completed by OT   Have you fallen 2 or more times in the past year? No   Have you fallen and had an injury in the past year? No   Is patient a fall risk? No   Abuse Screen (yes response referral indicated)   Feels Unsafe at Home or Work/School no   Feels Threatened by Someone no   Does Anyone Try to Keep You From Having Contact with Others or Doing Things Outside Your Home? no   Physical Signs of Abuse Present no   Sensation   Sensation Comments Reporting resolution of numbness/tingling with surgery and feeling good, no concerns at this time. See PT eval dated 12/28 identifying light touch intact across all cervical dermatomes   Posture   Posture Not impaired   Range of Motion (ROM)   ROM Comments ROM WFL shoulders through full finger opposition bilaterally, no pain with movement   Strength   Strength Comments PT is addressing, see eval dated 12/28/22 for MMT identifying 3/5 for biceps/triceps, client currently under lifting restrictions 10# or less 4 weeks post-op. Reporting no functional concerns related to strength at this time.   Transfer Skill   Level of [place]: Transfers independent   Bathing   Level of [place] - Bathing independent   Physical Assist/Nonphysical Assist - Bathing set-up required   Bathing Comments Using shower chair to sit facing away from water to keep incision site protected while healing   Upper Body Dressing   Level of [place]: Dress Upper Body independent   Lower Body Dressing   Level of [place]: Dress Lower Body independent   Toileting   Level of [place]: Toilet independent   Grooming   Level of [place]: Grooming independent   Eating/Self-Feeding   Level of [place]: Eating independent   Activity Tolerance   Activity Tolerance States she would like to return to work and is feeling ready to get back to normal activities but is compliant with 4 week healing period and weightlifting restrictions   Instrumental Activities of Daily Living Assessment   IADL  Quick Adds Meal Planning/Preparation;Home/Financial/Management;Community Mobility   Meal Planning/Preparation Doing light cooking tasks within restrictions at this time, no concerns with progressive return in line with precautions   Home/Financial Management Doing light chores within restrictions at this time, no concerns with progressive return in line with precautions   Community Mobility Not currently driving with neck restrcitions, no concerns with return to driving when restrictions cleared 4 weeks post op   Clinical Impression   Criteria for Skilled Therapeutic Interventions Met No   Clinical Impression Comments Client and family reporting no needs, demonstrating good compliance with precautions with initial return to activity, no funtional concerns requiring skilled OT at this time and no equipment needs identified.   Total Evaluation Time   OT Eval, Low Complexity Minutes (30426) 15

## 2023-01-06 ENCOUNTER — TELEPHONE (OUTPATIENT)
Dept: NEUROSURGERY | Facility: CLINIC | Age: 58
End: 2023-01-06

## 2023-01-06 ENCOUNTER — OFFICE VISIT (OUTPATIENT)
Dept: NEUROSURGERY | Facility: CLINIC | Age: 58
End: 2023-01-06
Payer: COMMERCIAL

## 2023-01-06 VITALS
DIASTOLIC BLOOD PRESSURE: 78 MMHG | HEART RATE: 99 BPM | OXYGEN SATURATION: 96 % | WEIGHT: 191.6 LBS | BODY MASS INDEX: 31.88 KG/M2 | SYSTOLIC BLOOD PRESSURE: 150 MMHG

## 2023-01-06 DIAGNOSIS — Z98.1 S/P CERVICAL SPINAL FUSION: Primary | ICD-10-CM

## 2023-01-06 PROCEDURE — 99024 POSTOP FOLLOW-UP VISIT: CPT | Performed by: NEUROLOGICAL SURGERY

## 2023-01-06 ASSESSMENT — PAIN SCALES - GENERAL: PAINLEVEL: NO PAIN (0)

## 2023-01-06 NOTE — PROGRESS NOTES
Date of service: /1/6/2023    Shakila Kapoor is a7 yyear old female who is 2 weeks s/p C4-6 ACDF.    Today, she reports no neck or shoulder pain.  Postop pain has resolved.  She has some swallowing difficulty intermittently but it is improving.    Wound C/D/I   No drainage or swelling noted.    Impression:  Shakila is doing well.  I have asked her to discontinue the neck brace in 2 weeks.  She may return to work as long as she wears the neck brace for 2 more weeks.    Once the collar comes off, she may drive.   I will see her back in clinic in 6 weeks.    Be Solorzano M.D.

## 2023-01-06 NOTE — LETTER
1/6/2023       RE: Shakila Kapoor  [address] [place] [address]     Dear Colleague,    Thank you for referring your patient, Shakila Kapoor, to the [facility] [place] [facility]. Please see a copy of my visit note below.    Date of service: /1/6/2023    Shakila Kapoor is a7 yyear old female who is 2 weeks s/p C4-6 ACDF.    Today, she reports no neck or shoulder pain.  Postop pain has resolved.  She has some swallowing difficulty intermittently but it is improving.    Wound C/D/I   No drainage or swelling noted.    Impression:  Shakila is doing well.  I have asked her to discontinue the neck brace in 2 weeks.  She may return to work as long as she wears the neck brace for 2 more weeks.    Once the collar comes off, she may drive.   I will see her back in clinic in 6 weeks.      Be Solorzano M.D.

## 2023-02-14 ENCOUNTER — OFFICE VISIT (OUTPATIENT)
Dept: FAMILY MEDICINE | Facility: CLINIC | Age: 58
End: 2023-02-14
Payer: COMMERCIAL

## 2023-02-14 VITALS
SYSTOLIC BLOOD PRESSURE: 144 MMHG | HEIGHT: 65 IN | OXYGEN SATURATION: 98 % | HEART RATE: 106 BPM | WEIGHT: 190.4 LBS | DIASTOLIC BLOOD PRESSURE: 70 MMHG | BODY MASS INDEX: 31.72 KG/M2 | RESPIRATION RATE: 25 BRPM | TEMPERATURE: 99.5 F

## 2023-02-14 DIAGNOSIS — I10 BENIGN ESSENTIAL HYPERTENSION: ICD-10-CM

## 2023-02-14 DIAGNOSIS — G35 MULTIPLE SCLEROSIS (H): ICD-10-CM

## 2023-02-14 DIAGNOSIS — G95.29 OTHER CORD COMPRESSION (H): ICD-10-CM

## 2023-02-14 DIAGNOSIS — E55.9 VITAMIN D DEFICIENCY: ICD-10-CM

## 2023-02-14 DIAGNOSIS — R68.82 LOW LIBIDO: ICD-10-CM

## 2023-02-14 DIAGNOSIS — Z23 NEED FOR PNEUMOCOCCAL VACCINATION: ICD-10-CM

## 2023-02-14 DIAGNOSIS — J02.9 SORE THROAT: Primary | ICD-10-CM

## 2023-02-14 DIAGNOSIS — R22.0 SUBCUTANEOUS MASS OF HEAD: ICD-10-CM

## 2023-02-14 DIAGNOSIS — D32.0 PRIMARY MENINGIOMA OF OPTIC NERVE SHEATH (H): ICD-10-CM

## 2023-02-14 PROCEDURE — 90677 PCV20 VACCINE IM: CPT | Performed by: NURSE PRACTITIONER

## 2023-02-14 PROCEDURE — 87651 STREP A DNA AMP PROBE: CPT | Performed by: NURSE PRACTITIONER

## 2023-02-14 PROCEDURE — 99214 OFFICE O/P EST MOD 30 MIN: CPT | Mod: 25 | Performed by: NURSE PRACTITIONER

## 2023-02-14 PROCEDURE — 90471 IMMUNIZATION ADMIN: CPT | Performed by: NURSE PRACTITIONER

## 2023-02-14 RX ORDER — LISINOPRIL 10 MG/1
10 TABLET ORAL DAILY
Qty: 90 TABLET | Refills: 1 | Status: SHIPPED | OUTPATIENT
Start: 2023-02-14 | End: 2023-08-21

## 2023-02-14 ASSESSMENT — ENCOUNTER SYMPTOMS
HEARTBURN: 0
PALPITATIONS: 0
PARESTHESIAS: 0
MYALGIAS: 0
NAUSEA: 0
JOINT SWELLING: 0
FEVER: 0
HEMATOCHEZIA: 0
WEAKNESS: 0
SHORTNESS OF BREATH: 0
DIARRHEA: 0
BREAST MASS: 0
FREQUENCY: 0
NERVOUS/ANXIOUS: 0
SORE THROAT: 1
CHILLS: 0
EYE PAIN: 0
ABDOMINAL PAIN: 0
ARTHRALGIAS: 0
COUGH: 0
HEADACHES: 0
DIZZINESS: 0
DYSURIA: 0
CONSTIPATION: 0
HEMATURIA: 0

## 2023-02-14 ASSESSMENT — PAIN SCALES - GENERAL: PAINLEVEL: NO PAIN (0)

## 2023-02-14 NOTE — PROGRESS NOTES
"  Assessment & Plan     Sore throat    - Streptococcus A Rapid Screen w/Reflex to PCR - Clinic Collect  - Group A Streptococcus PCR Throat Swab    Subcutaneous mass of head  On left forehead there is a firm, round, smooth, non-tender, fixed nodule.  The overlying skin is not erythematous.  Referral made to Adult General Surg ; Future    Benign essential hypertension  Uncontrolled  - Home Blood Pressure Monitor Order for DME - ONLY FOR DME  Start monitoring BP at home  - Start lisinopril (ZESTRIL) 10 MG tablet; Take 1 tablet (10 mg) by mouth daily  Return in about 4 weeks (around 3/14/2023) for BP Recheck.     Vitamin D deficiency    - cholecalciferol (VITAMIN D3) 125 mcg (5000 units) capsule; Take 1 capsule (125 mcg) by mouth daily    Low libido    - Ob/Gyn Referral; Future    Need for pneumococcal vaccination    - Pneumococcal 20 Valent Conjugate (Prevnar 20)    Other cord compression (H)  Known issue that I take into account for their medical decisions, no current exacerbations or new concerns.     Multiple sclerosis (H)  Known issue that I take into account for their medical decisions, no current exacerbations or new concerns.     Primary meningioma of optic nerve sheath (H)  Known issue that I take into account for their medical decisions, no current exacerbations or new concerns.                BMI:   Estimated body mass index is 31.68 kg/m  as calculated from the following:    Height as of this encounter: 1.651 m (5' 5\").    Weight as of this encounter: 86.4 kg (190 lb 6.4 oz).         Return in about 4 weeks (around 3/14/2023) for BP Recheck.    Margarita Suazo NP  [facility]    Alyssa Jhaveri is a 57 year old, presenting for the following health issues:  Derm Problem (Lump on forehead ) and Throat Pain (On and off for 3 days )      Healthy Habits:     Getting at least 3 servings of Calcium per day:  Yes    Bi-annual eye exam:  Yes    Dental care twice a year:  NO    Sleep " apnea or symptoms of sleep apnea:  Daytime drowsiness    Diet:  Regular (no restrictions)    Frequency of exercise:  None    Taking medications regularly:  Yes    Medication side effects:  None    PHQ-2 Total Score: 0    Additional concerns today:  Yes       Acute Illness  Acute illness concerns: sore throat  Onset/Duration: 3 days   Symptoms:  Fever: YES- 99.7  Chills/Sweats: YES- chills  Headache (location?): No  Sinus Pressure: No  Conjunctivitis:  No  Ear Pain: YES: feel plugged  Rhinorrhea: No  Congestion: YES  Sore Throat: YES  Cough: Yes - no mucus   Wheeze: No  Decreased Appetite: No  Nausea: No  Vomiting: No  Diarrhea: No  Dysuria/Freq.: No  Dysuria or Hematuria: No  Fatigue/Achiness: YES- hands aching  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  Wants to get hormones check.  No hot flashes, no insomnia.    Had Colonoscopy at [facility] 1/2019 that was normal.    Review of Systems   Constitutional: Negative for chills and fever.   HENT: Positive for sore throat. Negative for congestion, ear pain and hearing loss.    Eyes: Negative for pain and visual disturbance.   Respiratory: Negative for cough and shortness of breath.    Cardiovascular: Negative for chest pain, palpitations and peripheral edema.   Gastrointestinal: Negative for abdominal pain, constipation, diarrhea, heartburn, hematochezia and nausea.   Breasts:  Negative for tenderness, breast mass and discharge.   Genitourinary: Negative for dysuria, frequency, genital sores, hematuria, pelvic  "pain, urgency, vaginal bleeding and vaginal discharge.   Musculoskeletal: Negative for arthralgias, joint swelling and myalgias.   Skin: Negative for rash.   Neurological: Negative for dizziness, weakness, headaches and paresthesias.   Psychiatric/Behavioral: Negative for mood changes. The patient is not nervous/anxious.       Constitutional, HEENT, cardiovascular, pulmonary, GI, , musculoskeletal, neuro, skin, endocrine and psych systems are negative, except as otherwise noted.      Objective    BP (!) 144/70 (BP Location: Right arm, Patient Position: Sitting, Cuff Size: Adult Large)   Pulse 106   Temp 99.5  F (37.5  C) (Oral)   Resp 25   Ht 1.651 m (5' 5\")   Wt 86.4 kg (190 lb 6.4 oz)   SpO2 98%   BMI 31.68 kg/m    Body mass index is 31.68 kg/m .  Physical Exam   GENERAL: healthy, alert and no distress  EYES: Eyes grossly normal to inspection, PERRL and conjunctivae and sclerae normal  HENT: ear canals and TM's normal, nose and mouth without ulcers or lesions  NECK: no adenopathy  RESP: lungs clear to auscultation - no rales, rhonchi or wheezes  CV: regular rates and rhythm, normal S1 S2, no S3 or S4 and no murmur, click or rub  PSYCH: mentation appears normal, affect normal/bright  SKIN:  On left forehead there is a firm, round, smooth, non-tender, fixed nodule.  The overlying skin is not erythematous.    Results for orders placed or performed in visit on 02/14/23   Streptococcus A Rapid Screen w/Reflex to PCR - Clinic Collect     Status: Normal    Specimen: Throat; Swab   Result Value Ref Range    Group A Strep antigen Negative Negative   Group A Streptococcus PCR Throat Swab     Status: Normal    Specimen: Throat; Swab   Result Value Ref Range    Group A strep by PCR Not Detected Not Detected    Narrative    The Xpert Xpress Strep A test, performed on the [employer], is a rapid, qualitative in vitro diagnostic test for the detection of Streptococcus pyogenes (Group A ß-hemolytic " Streptococcus, Strep A) in throat swab specimens from patients with signs and symptoms of pharyngitis. The Xpert Xpress Strep A test can be used as an aid in the diagnosis of Group A Streptococcal pharyngitis. The assay is not intended to monitor treatment for Group A Streptococcus infections. The Xpert Xpress Strep A test utilizes an automated real-time polymerase chain reaction (PCR) to detect Streptococcus pyogenes DNA.

## 2023-02-14 NOTE — PROGRESS NOTES
SUBJECTIVE:   CC: Shakila is an 57 year old who presents for preventive health visit.   {Split Bill scripting  The purpose of this visit is to discuss your medical history and prevent health problems before you are sick. You may be responsible for a co-pay, coinsurance, or deductible if your visit today includes services such as checking on a sore throat, having an x-ray or lab test, or treating and evaluating a new or existing condition :043589}  {Patient advised of split billing (Optional):159694}  Healthy Habits:     Getting at least 3 servings of Calcium per day:  Yes    Bi-annual eye exam:  Yes    Dental care twice a year:  NO    Sleep apnea or symptoms of sleep apnea:  Daytime drowsiness    Diet:  Regular (no restrictions)    Frequency of exercise:  None    Taking medications regularly:  Yes    Medication side effects:  None    PHQ-2 Total Score: 0    Additional concerns today:  Yes    {Add if <65 person on Medicare  - Required Questions (Optional):797864}  {Outside tests to abstract? :708439}    {additional problems to add (Optional):256588}    Today's PHQ-2 Score:   PHQ-2 ( 1999 [employer]) 2/13/2023   Q1: Little interest or pleasure in doing things 0   Q2: Feeling down, depressed or hopeless 0   PHQ-2 Score 0   PHQ-2 Total Score (12-17 Years)- Positive if 3 or more points; Administer PHQ-A if positive -   Q1: Little interest or pleasure in doing things Not at all   Q2: Feeling down, depressed or hopeless Not at all   PHQ-2 Score 0       Have you ever done Advance Care Planning? (For example, a Health Directive, POLST, or a discussion with a medical provider or your loved ones about your wishes): { :011759}    Social History     Tobacco Use     Smoking status: Never     Smokeless tobacco: Never   Substance Use Topics     Alcohol use: Not Currently     Comment: rarely     {Rooming Staff- Complete this question if Prescreen response is not shown below for today's visit. If you drink alcohol do you typically  "have >3 drinks per day or >7 drinks per week? (Optional):841860}    Alcohol Use 2/13/2023   Prescreen: >3 drinks/day or >7 drinks/week? No   {add AUDIT responses (Optional) (A score of 7 for adult men is an indication of hazardous drinking; a score of 8 or more is an indication of an alcohol use disorder.  A score of 7 or more for adult women is an indication of hazardous drinking or an alchohol use disorder):305323}    Reviewed orders with patient.  Reviewed health maintenance and updated orders accordingly - { :402550::\"Yes\"}  {Chronicprobdata (optional):659103}    Breast Cancer Screening:    FHS-7:   Breast CA Risk Assessment (FHS-7) 4/12/2022 2/13/2023   Did any of your first-degree relatives have breast or ovarian cancer? No No   Did any of your relatives have bilateral breast cancer? No No   Did any man in your family have breast cancer? No No   Did any woman in your family have breast and ovarian cancer? No No   Did any woman in your family have breast cancer before age 50 y? No No   Do you have 2 or more relatives with breast and/or ovarian cancer? No No   Do you have 2 or more relatives with breast and/or bowel cancer? Yes Yes     {If any of the questions to the BCRA (FHS-7) are answered yes, consider ordering referral for genetic counseling (Optional) :516339::\"click delete button to remove this line now\"}  {AMB Mammogram Decision Support (Optional) :522425}  Pertinent mammograms are reviewed under the imaging tab.    History of abnormal Pap smear: { :641372}     Reviewed and updated as needed this visit by clinical staff                  Reviewed and updated as needed this visit by Provider                 {HISTORY OPTIONS (Optional):583681}    Review of Systems   Constitutional: Negative for chills and fever.   HENT: Positive for sore throat. Negative for congestion, ear pain and hearing loss.    Eyes: Negative for pain and visual disturbance.   Respiratory: Negative for cough and shortness of breath.  " "  Cardiovascular: Negative for chest pain, palpitations and peripheral edema.   Gastrointestinal: Negative for abdominal pain, constipation, diarrhea, heartburn, hematochezia and nausea.   Breasts:  Negative for tenderness, breast mass and discharge.   Genitourinary: Negative for dysuria, frequency, genital sores, hematuria, pelvic pain, urgency, vaginal bleeding and vaginal discharge.   Musculoskeletal: Negative for arthralgias, joint swelling and myalgias.   Skin: Negative for rash.   Neurological: Negative for dizziness, weakness, headaches and paresthesias.   Psychiatric/Behavioral: Negative for mood changes. The patient is not nervous/anxious.      {FEMALE ROS (Optional):385734}     OBJECTIVE:   There were no vitals taken for this visit.  Physical Exam  {Exam Choices (Optional):699270}    {Diagnostic Test Results (Optional):075095::\"Diagnostic Test Results:\",\"Labs reviewed in Epic\"}    ASSESSMENT/PLAN:   {Diag Picklist:066592}    {Patient advised of split billing (Optional):959188}      COUNSELING:  {FEMALE COUNSELING MESSAGES:747061::\"Reviewed preventive health counseling, as reflected in patient instructions\"}      BMI:   Estimated body mass index is 31.88 kg/m  as calculated from the following:    Height as of 12/20/22: 1.651 m (5' 5\").    Weight as of 1/6/23: 86.9 kg (191 lb 9.6 oz).   {Weight Management Plan needed for ACO:227110}      She reports that she has never smoked. She has never used smokeless tobacco.      {Counseling Resources  [place] Preventive Services Task Force  Cholesterol Screening  Health diet/nutrition  Pooled Cohorts Equation Calculator  USDA's MyPlate  ASA Prophylaxis  Lung CA Screening  Osteoporosis prevention/bone health :185186}  {Breast Cancer Risk Calculator  BRCA-Related Cancer Risk Assessment FHS-7 Tool :334229}  Margarita Suazo NP  [facility]  "

## 2023-02-14 NOTE — Clinical Note
Please abstract the following data from this visit with this patient into the appropriate field in Epic:  Tests that can be patient reported without a hard copy:  Colonoscopy done on this date: 1/2019 (approximately), by this group: [facility], results were normal.   Other Tests found in the patient's chart through Chart Review/Care Everywhere:  {Abstract Quality List (Optional):005568}  Note to Abstraction: If this section is blank, no results were found via Chart Review/Care Everywhere.

## 2023-02-14 NOTE — PATIENT INSTRUCTIONS
Dr. Heck and Dr. Guadalupe both OB/GYN they come the [facility]

## 2023-02-14 NOTE — NURSING NOTE
Prior to immunization administration, verified patients identity using patient s name and date of birth. Please see Immunization Activity for additional information.     Screening Questionnaire for Adult Immunization    Are you sick today?   YES- see JQ's note    Do you have allergies to medications, food, a vaccine component or latex?   No   Have you ever had a serious reaction after receiving a vaccination?   No   Do you have a long-term health problem with heart, lung, kidney, or metabolic disease (e.g., diabetes), asthma, a blood disorder, no spleen, complement component deficiency, a cochlear implant, or a spinal fluid leak?  Are you on long-term aspirin therapy?   No   Do you have cancer, leukemia, HIV/AIDS, or any other immune system problem?   No   Do you have a parent, brother, or sister with an immune system problem?   No   In the past 3 months, have you taken medications that affect  your immune system, such as prednisone, other steroids, or anticancer drugs; drugs for the treatment of rheumatoid arthritis, Crohn s disease, or psoriasis; or have you had radiation treatments?   No   Have you had a seizure, or a brain or other nervous system problem?   No   During the past year, have you received a transfusion of blood or blood    products, or been given immune (gamma) globulin or antiviral drug?   No   For women: Are you pregnant or is there a chance you could become       pregnant during the next month?   No   Have you received any vaccinations in the past 4 weeks?   No     Immunization questionnaire answers were all negative.        Per orders of  Margarita WILKINS, injection of PCV20 given by Crystal Reveles MA. Patient instructed to remain in clinic for 15 minutes afterwards, and to report any adverse reaction to me immediately.       Screening performed by Crystal Reveles MA on 2/14/2023 at 9:06 AM.

## 2023-02-15 LAB
DEPRECATED S PYO AG THROAT QL EIA: NEGATIVE
GROUP A STREP BY PCR: NOT DETECTED

## 2023-02-16 ENCOUNTER — TELEPHONE (OUTPATIENT)
Dept: NEUROSURGERY | Facility: CLINIC | Age: 58
End: 2023-02-16
Payer: COMMERCIAL

## 2023-02-17 ENCOUNTER — OFFICE VISIT (OUTPATIENT)
Dept: NEUROSURGERY | Facility: CLINIC | Age: 58
End: 2023-02-17
Payer: COMMERCIAL

## 2023-02-17 ENCOUNTER — ANCILLARY PROCEDURE (OUTPATIENT)
Dept: GENERAL RADIOLOGY | Facility: CLINIC | Age: 58
End: 2023-02-17
Attending: NEUROLOGICAL SURGERY
Payer: COMMERCIAL

## 2023-02-17 VITALS
OXYGEN SATURATION: 99 % | RESPIRATION RATE: 16 BRPM | DIASTOLIC BLOOD PRESSURE: 81 MMHG | BODY MASS INDEX: 31.62 KG/M2 | SYSTOLIC BLOOD PRESSURE: 138 MMHG | HEART RATE: 74 BPM | WEIGHT: 190 LBS

## 2023-02-17 DIAGNOSIS — Z98.1 S/P CERVICAL SPINAL FUSION: Primary | ICD-10-CM

## 2023-02-17 DIAGNOSIS — Z98.1 S/P CERVICAL SPINAL FUSION: ICD-10-CM

## 2023-02-17 PROCEDURE — 99024 POSTOP FOLLOW-UP VISIT: CPT | Performed by: NEUROLOGICAL SURGERY

## 2023-02-17 PROCEDURE — 72040 X-RAY EXAM NECK SPINE 2-3 VW: CPT | Performed by: RADIOLOGY

## 2023-02-17 ASSESSMENT — PAIN SCALES - GENERAL: PAINLEVEL: NO PAIN (1)

## 2023-02-17 NOTE — TELEPHONE ENCOUNTER
FUTURE VISIT INFORMATION      FUTURE VISIT INFORMATION:    Date: 4/26/23    Time: 8:00am    [place] [facility] [place]FORMATION:    Referring provider:  Margarita Suazo NP    Referring providers clinic:  [facility]    Reason for visit/diagnosis  Subcutaneous mass of head    RECORDS REQUESTED FROM:       Clinic name Comments Records Status Imaging [facility]   [facility]  [facility]ref[facility] 2/14/23 EPIC    Imaging MR Brain done 4/12/23  MR Brain done 5/5/22  MR Brain done 4/8/22 [facility]

## 2023-02-17 NOTE — PROGRESS NOTES
Date of service: 2/17/2023     Shakila Kapoor is a 57 year old female who is 8 weeks s/p C4-6 ACDF.     Today, she reports no neck or shoulder pain.  No swallowing difficulty.  She has intermittent raspy sound but no hoarseness. This began about a week ago.     Neuro intact.    I have personally reviewed the cervical spine Xrays which show a stable construct.     Impression:  Shakila is doing well.  It is not clear if the intermittent raspy sound is from the surgery as it began about a week ago.  I will see her back in 4 months with repeat Xrays.     Be Solorzano M.D.

## 2023-02-17 NOTE — LETTER
2/17/2023       RE: Shakila Kapoor  [address] [place] [address]     Dear Colleague,    Thank you for referring your patient, Shakila Kapoor, to the [facility]UROS[facility] [place] at [facility]. Please see a copy of my visit note below.    Date of service: 2/17/2023     Shakila Kapoor is a 57 year old female who is 8 weeks s/p C4-6 ACDF.     Today, she reports no neck or shoulder pain.  No swallowing difficulty.  She has intermittent raspy sound but no hoarseness. This began about a week ago.     Neuro intact.    I have personally reviewed the cervical spine Xrays which show a stable construct.     Impression:  Shakila is doing well.  It is not clear if the intermittent raspy sound is from the surgery as it began about a week ago.  I will see her back in 4 months with repeat Xrays.     Be Solorzano M.D.

## 2023-04-17 DIAGNOSIS — D32.0 PRIMARY MENINGIOMA OF OPTIC NERVE SHEATH (H): Primary | ICD-10-CM

## 2023-04-18 ENCOUNTER — TELEPHONE (OUTPATIENT)
Dept: NEUROSURGERY | Facility: CLINIC | Age: 58
End: 2023-04-18
Payer: COMMERCIAL

## 2023-04-26 ENCOUNTER — OFFICE VISIT (OUTPATIENT)
Dept: PLASTIC SURGERY | Facility: CLINIC | Age: 58
End: 2023-04-26
Attending: NURSE PRACTITIONER
Payer: COMMERCIAL

## 2023-04-26 ENCOUNTER — PRE VISIT (OUTPATIENT)
Dept: PLASTIC SURGERY | Facility: CLINIC | Age: 58
End: 2023-04-26

## 2023-04-26 VITALS
SYSTOLIC BLOOD PRESSURE: 120 MMHG | HEIGHT: 65 IN | OXYGEN SATURATION: 98 % | DIASTOLIC BLOOD PRESSURE: 78 MMHG | HEART RATE: 89 BPM | BODY MASS INDEX: 32.15 KG/M2 | WEIGHT: 193 LBS

## 2023-04-26 DIAGNOSIS — R22.0 SUBCUTANEOUS MASS OF HEAD: ICD-10-CM

## 2023-04-26 PROCEDURE — 99204 OFFICE O/P NEW MOD 45 MIN: CPT | Performed by: PLASTIC SURGERY

## 2023-04-26 ASSESSMENT — PAIN SCALES - GENERAL: PAINLEVEL: NO PAIN (0)

## 2023-04-26 NOTE — NURSING NOTE
"Chief Complaint   Patient presents with     Consult     Shakila, is being seen today for a consult regarding a subcutaneous mass of the head, referred by Margarita Thomason NP.       Vitals:    04/26/23 0806   BP: 120/78   BP Location: Left arm   Patient Position: Chair   Cuff Size: Adult Large   Pulse: 89   SpO2: 98%   Weight: 87.5 kg (193 lb)   Height: 1.651 m (5' 5\")       Body mass index is 32.12 kg/m .      Pat Grewal LPN    "

## 2023-04-26 NOTE — PROGRESS NOTES
CONSULTATION NOTE    REFERRING PROVIDER:  Margarita Suazo    PRESENTING COMPLAINT:  Consultation for a left forehead mass.    HISTORY OF PRESENTING COMPLAINT:  Ms. Kapoor is 57 years old.  She noticed a lump on the left side of her forehead about 6 months ago and has had no issues.  No pain, no drainage, no growth.  Has not changed in any way.  Here to have it looked at.    PAST MEDICAL HISTORY:  Hypertension, multiple sclerosis and optic meningioma.      PAST SURGICAL HISTORY:  Cervical spine surgery and wisdom teeth extraction.    MEDICATIONS:    1.  Zeposia.  2.  Oxybutynin.    3.  Paxil.    4.  Zestril.    5.  Lorazepam.    ALLERGIES:  Penicillin, rash.    SOCIAL HISTORY:  Does not smoke, socially drinks.  Lives in [place], is a special .    REVIEW OF SYSTEMS:  Denies chest pain, shortness of breath, MI, CVA, DVT and PE.    PHYSICAL EXAMINATION:    VITAL SIGNS:  Stable.  She is afebrile, in no obvious distress.  She is 5 feet 5 inches, 193 pounds, BMI of 32 kg/m2.    HEENT:  On examination of her forehead, she has about a less than 1 cm x 1 cm hard mass in the left mid forehead.  The surrounding tissue is mobile.  The mass itself is hard and nonmobile.    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  Given the fact that there are no symptoms, has not grown in over 6 months and she has no pain, plan is to just follow this and see if it changes in any way and if it does change in any way, get an ultrasound to see if this is a bony or soft tissue.  If it is soft tissue, we will excise it.  If it is bony, we will get a CAT scan to see the extent of the problem.  If, however, it remains the same every 3-6 months, then we just follow this conservatively, as per the patient's wishes.  I think that is reasonable.  All her questions were answered.  All exam and discussion done in presence of my nurse, Agata Chapman.  She was happy the  visit.    Total time spent in the encounter today including chart review, visit itself and post-visit paperwork was 45 minutes.    BAO Burleson MD    cc:  Margarita Suazo NP   [id number] [place] [address]

## 2023-04-26 NOTE — LETTER
4/26/2023       RE: Shakila Kapoor  [address]  [place] [address]       Dear Colleague,    Thank you for referring your patient, Shakila Kapoor, to the [facility] [place] at [facility]. Please see a copy of my visit note below.    CONSULTATION NOTE    REFERRING PROVIDER:  Margarita Suazo    PRESENTING COMPLAINT:  Consultation for a left forehead mass.    HISTORY OF PRESENTING COMPLAINT:  Ms. Kapoor is 57 years old.  She noticed a lump on the left side of her forehead about 6 months ago and has had no issues.  No pain, no drainage, no growth.  Has not changed in any way.  Here to have it looked at.    PAST MEDICAL HISTORY:  Hypertension, multiple sclerosis and optic meningioma.      PAST SURGICAL HISTORY:  Cervical spine surgery and wisdom teeth extraction.    MEDICATIONS:    1.  Zeposia.  2.  Oxybutynin.    3.  Paxil.    4.  Zestril.    5.  Lorazepam.    ALLERGIES:  Penicillin, rash.    SOCIAL HISTORY:  Does not smoke, socially drinks.  Lives in [place], is a special .    REVIEW OF SYSTEMS:  Denies chest pain, shortness of breath, MI, CVA, DVT and PE.    PHYSICAL EXAMINATION:    VITAL SIGNS:  Stable.  She is afebrile, in no obvious distress.  She is 5 feet 5 inches, 193 pounds, BMI of 32 kg/m2.    HEENT:  On examination of her forehead, she has about a less than 1 cm x 1 cm hard mass in the left mid forehead.  The surrounding tissue is mobile.  The mass itself is hard and nonmobile.    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  Given the fact that there are no symptoms, has not grown in over 6 months and she has no pain, plan is to just follow this and see if it changes in any way and if it does change in any way, get an ultrasound to see if this is a bony or soft tissue.  If it is soft tissue, we will excise it.  If it  is bony, we will get a CAT scan to see the extent of the problem.  If, however, it remains the same every 3-6 months, then we just follow this conservatively, as per the patient's wishes.  I think that is reasonable.  All her questions were answered.  All exam and discussion done in presence of my nurse, Agata Chapman.  She was happy the visit.    Total time spent in the encounter today including chart review, visit itself and post-visit paperwork was 45 minutes.        Again, thank you for allowing me to participate in the care of your patient.      Sincerely,    BAO Burleson MD

## 2023-05-02 NOTE — PROGRESS NOTES
Refer to initial evaluation as discharge summary as patient did not return to complete recommended course of therapy.  Answers for HPI/ROS submitted by the patient on 12/28/2022  Reason for Visit:: neck post surgery  When problem began:: 12/20/2022  How problem occurred:: disc fusion  Number scale: 0/10  General health as reported by patient: good  Medical allergies: other  Other Allergies Detail: penicillin  Surgeries: orthopedic surgery  Medications you are currently taking: anti-depressants, other  Other Meds Detail: Zeposia for MS and Oxibutin for bladdar  Occupation::   What are your primary job tasks: computer work, driving

## 2023-05-11 ENCOUNTER — OFFICE VISIT (OUTPATIENT)
Dept: NEUROLOGY | Facility: CLINIC | Age: 58
End: 2023-05-11
Attending: PSYCHIATRY & NEUROLOGY
Payer: COMMERCIAL

## 2023-05-11 ENCOUNTER — LAB (OUTPATIENT)
Dept: LAB | Facility: CLINIC | Age: 58
End: 2023-05-11
Payer: COMMERCIAL

## 2023-05-11 VITALS
SYSTOLIC BLOOD PRESSURE: 125 MMHG | OXYGEN SATURATION: 97 % | WEIGHT: 184.3 LBS | BODY MASS INDEX: 30.67 KG/M2 | HEART RATE: 75 BPM | DIASTOLIC BLOOD PRESSURE: 85 MMHG

## 2023-05-11 DIAGNOSIS — R39.15 URINARY URGENCY: ICD-10-CM

## 2023-05-11 DIAGNOSIS — E78.2 MIXED HYPERLIPIDEMIA: Primary | ICD-10-CM

## 2023-05-11 DIAGNOSIS — E55.9 VITAMIN D DEFICIENCY: ICD-10-CM

## 2023-05-11 DIAGNOSIS — G35 MS (MULTIPLE SCLEROSIS) (H): Primary | ICD-10-CM

## 2023-05-11 DIAGNOSIS — G35 MS (MULTIPLE SCLEROSIS) (H): ICD-10-CM

## 2023-05-11 LAB
ALBUMIN SERPL BCG-MCNC: 4.5 G/DL (ref 3.5–5.2)
ALP SERPL-CCNC: 71 U/L (ref 35–104)
ALT SERPL W P-5'-P-CCNC: 16 U/L (ref 10–35)
AST SERPL W P-5'-P-CCNC: 18 U/L (ref 10–35)
BASOPHILS # BLD AUTO: 0 10E3/UL (ref 0–0.2)
BASOPHILS NFR BLD AUTO: 0 %
BILIRUB DIRECT SERPL-MCNC: <0.2 MG/DL (ref 0–0.3)
BILIRUB SERPL-MCNC: 0.4 MG/DL
CHOLEST SERPL-MCNC: 243 MG/DL
DEPRECATED CALCIDIOL+CALCIFEROL SERPL-MC: 32 UG/L (ref 20–75)
EOSINOPHIL # BLD AUTO: 0.1 10E3/UL (ref 0–0.7)
EOSINOPHIL NFR BLD AUTO: 1 %
ERYTHROCYTE [DISTWIDTH] IN BLOOD BY AUTOMATED COUNT: 13.2 % (ref 10–15)
HCT VFR BLD AUTO: 44.7 % (ref 35–47)
HDLC SERPL-MCNC: 45 MG/DL
HGB BLD-MCNC: 15.1 G/DL (ref 11.7–15.7)
IMM GRANULOCYTES # BLD: 0 10E3/UL
IMM GRANULOCYTES NFR BLD: 1 %
LDLC SERPL CALC-MCNC: 167 MG/DL
LYMPHOCYTES # BLD AUTO: 0.5 10E3/UL (ref 0.8–5.3)
LYMPHOCYTES NFR BLD AUTO: 8 %
MCH RBC QN AUTO: 31 PG (ref 26.5–33)
MCHC RBC AUTO-ENTMCNC: 33.8 G/DL (ref 31.5–36.5)
MCV RBC AUTO: 92 FL (ref 78–100)
MONOCYTES # BLD AUTO: 0.6 10E3/UL (ref 0–1.3)
MONOCYTES NFR BLD AUTO: 9 %
NEUTROPHILS # BLD AUTO: 5.2 10E3/UL (ref 1.6–8.3)
NEUTROPHILS NFR BLD AUTO: 81 %
NONHDLC SERPL-MCNC: 198 MG/DL
NRBC # BLD AUTO: 0 10E3/UL
NRBC BLD AUTO-RTO: 0 /100
PLATELET # BLD AUTO: 285 10E3/UL (ref 150–450)
PROT SERPL-MCNC: 7.2 G/DL (ref 6.4–8.3)
RBC # BLD AUTO: 4.87 10E6/UL (ref 3.8–5.2)
TRIGL SERPL-MCNC: 156 MG/DL
WBC # BLD AUTO: 6.4 10E3/UL (ref 4–11)

## 2023-05-11 PROCEDURE — 36415 COLL VENOUS BLD VENIPUNCTURE: CPT | Performed by: PATHOLOGY

## 2023-05-11 PROCEDURE — G0463 HOSPITAL OUTPT CLINIC VISIT: HCPCS

## 2023-05-11 PROCEDURE — 99214 OFFICE O/P EST MOD 30 MIN: CPT | Mod: GC | Performed by: PSYCHIATRY & NEUROLOGY

## 2023-05-11 PROCEDURE — 80061 LIPID PANEL: CPT | Performed by: PATHOLOGY

## 2023-05-11 PROCEDURE — 85025 COMPLETE CBC W/AUTO DIFF WBC: CPT | Performed by: PATHOLOGY

## 2023-05-11 PROCEDURE — 82306 VITAMIN D 25 HYDROXY: CPT | Performed by: PSYCHIATRY & NEUROLOGY

## 2023-05-11 PROCEDURE — 80076 HEPATIC FUNCTION PANEL: CPT | Performed by: PATHOLOGY

## 2023-05-11 ASSESSMENT — PAIN SCALES - GENERAL: PAINLEVEL: NO PAIN (0)

## 2023-05-11 NOTE — Clinical Note
2023       RE: Shakila Kapoor  [address] [place] [address]     Dear Colleague,    Thank you for referring your patient, Shakila Kapoor, to the [facility]ULT[facility]LE [facility]IN[facility] [place] at [facility]. Please see a copy of my visit note below.    [facility]   Multiple Sclerosis Service Progress Note  Patient Name:  Shakila Kapoor  MRN:  [id number]    :  1965  Date of Service:  May 11, 2023  Primary care provider:  Margarita Suazo    Patient Summary:  Ms. Kapoor is a 57 year old-year-old right-handed*** female patient who returns to the clinic today for regularly scheduled follow-up of multiple sclerosis.      Disease Coruse:   At age 20 left side sensory symptoms and probable optic neuritis. She was initially thought to have probable/possible MS. She did not have any neurologic changes until 2019 when she developed progressive vision loss with right eye. 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 She then did develop demyelinating disease on MRI and was started on Rebif. She met with neurophthalmology and her vision remains stable. She has significant vision loss in left eye.      Previous disease modifying therapy:   Rebif (ragiologic disease progression)   ozanimod      Symptom management:  Fatigue: Low energy and tiredness continues to be a problem.  -sleep med visit 11/15/22   Bowel/bladder changes: Oxybutynin is working really well for urinary symptoms.     Interval History:   Since she was last seen in clinic 6 months ago, she underwent C4-6 ACDF with Dr. Solorzano.    She denies any new visual, sensory or motor symptoms suggestive of recurrent inflammation in the optic nerves, brainstem or spinal cord.  Primary symptomatic concerns today are ***    No concerns with ozanimod. Taking consistently.          Review of Systems:    4pt neg    Medications:     Current Outpatient Medications:      cholecalciferol (VITAMIN D3) 125 mcg (5000 units) capsule, Take 1 capsule (125 mcg) by mouth daily, Disp: 90 capsule, Rfl: 3     lisinopril (ZESTRIL) 10 MG tablet, Take 1 tablet (10 mg) by mouth daily, Disp: 90 tablet, Rfl: 1     LORazepam (ATIVAN) 0.5 MG tablet, Take 0.5-1 mg by mouth daily as needed (Patient not taking: Reported on 4/26/2023), Disp: , Rfl:      oxybutynin ER (DITROPAN XL) 10 MG 24 hr tablet, TAKE 1 TABLET(10 MG) BY MOUTH DAILY (Patient taking differently: 10 mg At Bedtime), Disp: 30 tablet, Rfl: 11     oxyCODONE (ROXICODONE) 5 MG tablet, Take 1 tablet (5 mg) by mouth every 3 hours as needed for moderate pain (4-6), Disp: 45 tablet, Rfl: 0     Ozanimod HCl (ZEPOSIA) 0.92 MG CAPS, Take 0.92 mg by mouth daily (Patient taking differently: Take 0.92 mg by mouth At Bedtime), Disp: 30 capsule, Rfl: 11     PARoxetine (PAXIL) 30 MG tablet, Take 30 mg by mouth every evening, Disp: , Rfl:     Exam: There were no vitals taken for this visit.  Constitutional: Alert. No acute distress.   Head: Atraumatic, normocephalic.  ENT: Moist mucous membranes. No sinus drainage.  Cardiovascular: Appears warm, well-perfused, and non-toxic.   Respiratory: No increased work of breathing, no accessory muscle use.   Skin: No rashes or lesions appreciated on visualized skin.   Hematologic/Lymphatic/Immunologic: No bruising appreciated on visualized skin.     Neurologic:  Mental Status: Alert. Speech is fluent, able to comprehend, and follows commands. No dysarthria.  Cranial nerves: Visual fields are full to confrontation. Pupils are round and react to light and there is no Antione Hema pupil***. Extraocular movements are intact with no internuclear ophthalmoplegia or nystagmus. Smile and eyebrow raise equal, blinking is symmetric. Tongue midline. Hearing intact to casual conversation.   Motor: Strength is normal (5/5) in the following muscles/movements  bilaterally: deltoids, biceps, triceps, wrist extensors, finger extensors, finger flexors, finger abductors, hip flexors, hamstrings, and dorsiflexion/plantarflexion. There are no tremors, myoclonus or other abnormal movements appreciated. No pronator or sensory drift.   Sensory: Light touch symmetric. Romberg negative.  Reflexes: Reflexes are symmetric and within normal limits at biceps, brachioradialis, knees and ankles. No clonus.  Coordination: No appendicular ataxia on finger to nose testing. HTS intact.   Gait: Gait is narrow based and steady. Patient is able to walk on heels, toes and in tandem without difficulty. ***      Data: ***    Vitamin D Deficiency Screening Results:  Lab Results   Component Value Date    VITDT 20 11/10/2022    VITDT 37 10/12/2021       CBC RESULTS:   Recent Labs   Lab Test 12/21/22  0736   WBC 10.9   RBC 4.34   HGB 13.4   HCT 40.1   MCV 92   MCH 30.9   MCHC 33.4   RDW 13.1           Imaging:   No new imaging since last visit. *** Imaging reviewed. To my eye, ***        Assessment:   # multiple sclerosis   # urinary urgency   # gait instability   # cervical stenosis      -continue zeposia    -continue oxybutynin   -blood tests   -return 6 months   -referral for neurosurgery re cervical stenosis     Plan:   -***       Patient seen and discussed with attending neurologist  ***    Raina Brooks MD  PGY4 Neurology            Again, thank you for allowing me to participate in the care of your patient.      Sincerely,    Daniel aHrris MD

## 2023-05-11 NOTE — PROGRESS NOTES
[facility]   Multiple Sclerosis Clinic  Progress Note    Patient Summary:  Ms. Kapoor is a 57 year old-year-old right-handed female patient who returns to the clinic today for regularly scheduled follow-up of multiple sclerosis.      Disease Course:   At age 20 left side sensory symptoms and probable optic neuritis. She was initially thought to have probable/possible MS. She did not have any neurologic changes until 2019 when she developed progressive vision loss with right eye. 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 She then did develop demyelinating disease on MRI and was started on Rebif. She has significant vision loss in right eye.      Previous disease modifying therapy:   Rebif (ragiologic disease progression)  2021 ozanimod      Symptom management:  Fatigue: Low energy and tiredness continues to be a problem.  -sleep med visit 11/15/22   Bowel/bladder changes: Oxybutynin     Interval History:   Since she was last seen in clinic 6 months ago, she underwent C4-6 ACDF with Dr. Solorzano. Went well, no change in symptoms noted by patient.     She denies any new visual, sensory or motor symptoms suggestive of recurrent inflammation in the optic nerves, brainstem or spinal cord.      No concerns with ozanimod. Taking consistently. Having difficulty getting the medication covered from the insurance company, in process. She has 2 month supply left.     Saving some additional urinary urgency, particularly with drinking diet cokes. Able to manage.     Energy level is low currently, though she notes that she is a  and the end of the year is always very busy.     Taking alpha lipoic acid, and 5000 units daily vitamin D.     Walks intermittently.     Medications:     Current Outpatient Medications:      cholecalciferol (VITAMIN D3) 125 mcg (5000 units) capsule, Take 1 capsule (125 mcg) by mouth daily, Disp: 90 capsule, Rfl: 3      lisinopril (ZESTRIL) 10 MG tablet, Take 1 tablet (10 mg) by mouth daily, Disp: 90 tablet, Rfl: 1     LORazepam (ATIVAN) 0.5 MG tablet, Take 0.5-1 mg by mouth daily as needed (Patient not taking: Reported on 4/26/2023), Disp: , Rfl:      oxybutynin ER (DITROPAN XL) 10 MG 24 hr tablet, TAKE 1 TABLET(10 MG) BY MOUTH DAILY (Patient taking differently: 10 mg At Bedtime), Disp: 30 tablet, Rfl: 11     oxyCODONE (ROXICODONE) 5 MG tablet, Take 1 tablet (5 mg) by mouth every 3 hours as needed for moderate pain (4-6), Disp: 45 tablet, Rfl: 0     Ozanimod HCl (ZEPOSIA) 0.92 MG CAPS, Take 0.92 mg by mouth daily (Patient taking differently: Take 0.92 mg by mouth At Bedtime), Disp: 30 capsule, Rfl: 11     PARoxetine (PAXIL) 30 MG tablet, Take 30 mg by mouth every evening, Disp: , Rfl:     Exam: /85 (BP Location: Right arm, Patient Position: Sitting, Cuff Size: Adult Regular)   Pulse 75   Wt 83.6 kg (184 lb 4.8 oz)   SpO2 97%   BMI 30.67 kg/m    Constitutional: Alert. No acute distress.   Head: Atraumatic, normocephalic.  ENT: Moist mucous membranes. No sinus drainage.  Cardiovascular: Appears warm, well-perfused, and non-toxic.   Respiratory: No increased work of breathing, no accessory muscle use.   Skin: No rashes or lesions appreciated on visualized skin.   Hematologic/Lymphatic/Immunologic: No bruising appreciated on visualized skin.     Neurologic:  Mental Status: Alert. Speech is fluent, able to comprehend, and follows commands. No dysarthria.  Cranial nerves: EOMI. Pupils reactive to light. Right eye with finger wiggle at 6 inches, and color desaturation. Visual fields in left eye intact. Smile and eyebrow raise equal, blinking is symmetric. Tongue midline. Hearing intact to casual conversation.   Motor:  Manual muscle testing revealed the following MRC grade muscle power:   Right Left   Shoulder abduction:  5 5   Elbow Flexion: 5 5   Elbow Extension:  5 5   Wrist Extension:  5 5   Finger Extension:  5 5   FDI 5 5    Finger Flexion 5 5   Wrist Flexion 5 5   Hip Flexion 4+ 4+   Knee Extension 5 5   Knee Flexion 5 5   Dorsiflexion 5 5-   Plantar flexion 5 5       Sensory: Romberg with sway. Decreased vibration R>L.  Reflexes: Reflexes are symmetric at biceps, brachioradialis, knees and ankles. No clonus.  Coordination: No appendicular ataxia on finger to nose testing. HTS intact.   Gait: Gait is wide based and valgus at ankle. Difficulty standing in tandem. Difficulty with tip toe and heel walk.      Data:     Vitamin D Deficiency Screening Results:  Lab Results   Component Value Date    VITDT 20 11/10/2022    VITDT 37 10/12/2021     CBC RESULTS:   Recent Labs   Lab Test 12/21/22  0736   WBC 10.9   RBC 4.34   HGB 13.4   HCT 40.1   MCV 92   MCH 30.9   MCHC 33.4   RDW 13.1           Imaging:   No new imaging since last visit.      Assessment/Plan:   # multiple sclerosis   # urinary urgency   # gait instability   # meningioma    -imaging scheduled for next week (delayed when she had covid), will follow  -continue zeposia  (instructed to call clinic if issues obtaining medication in the next month)  -continue oxybutynin   -counseled on exercise recommendations  -CBC, LFTs, vit D level today  -return 6 months       Patient seen and discussed with attending neurologist Dr. Kimberly Brooks MD  PGY4 Neurology    Attending physician: I saw and evaluated the patient with Dr. Brooks, and I agree with her findings and plan of care as documented above.    The patient remains clinically stable as regards any evidence of active inflammatory demyelination on current disease modifying therapy with ozanimod. We will review MRI scan due to be performed next week to ensure that radiologic stability is also maintained.    I will continue oxybutynin XL 10 mg daily, which appears to be adequately controlling her symptoms with avoidance of triggers such as caffeine.    Today, I will check routine laboratory studies for monitoring of  therapy with ozanimod, including CBC with differential and hepatic panel.    I will also check a vitamin D level to follow up on previous finding of vitamin D deficiency, and advise her on supplementation with vitamin D as needed to maintain a level within our goal range of 60-80 mcg/L.     Daniel Harris MD   of Neurology  [facility]    Diagnoses:  1) MS  2) Urinary urgency  3) Vitamin D deficiency

## 2023-05-11 NOTE — LETTER
5/11/2023      RE: Shakila Kapoor  [address] [place] [address]     [facility]   Multiple Sclerosis Clinic  Progress Note    Patient Summary:  Ms. Kapoor is a 57 year old-year-old right-handed female patient who returns to the clinic today for regularly scheduled follow-up of multiple sclerosis.      Disease Course:   At age 20 left side sensory symptoms and probable optic neuritis. She was initially thought to have probable/possible MS. She did not have any neurologic changes until 2019 when she developed progressive vision loss with right eye. 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 She then did develop demyelinating disease on MRI and was started on Rebif. She has significant vision loss in right eye.      Previous disease modifying therapy:   Rebif (ragiologic disease progression)  2021 ozanimod      Symptom management:  Fatigue: Low energy and tiredness continues to be a problem.  -sleep med visit 11/15/22   Bowel/bladder changes: Oxybutynin     Interval History:   Since she was last seen in clinic 6 months ago, she underwent C4-6 ACDF with Dr. Solorzano. Went well, no change in symptoms noted by patient.     She denies any new visual, sensory or motor symptoms suggestive of recurrent inflammation in the optic nerves, brainstem or spinal cord.      No concerns with ozanimod. Taking consistently. Having difficulty getting the medication covered from the insurance company, in process. She has 2 month supply left.     Saving some additional urinary urgency, particularly with drinking diet cokes. Able to manage.     Energy level is low currently, though she notes that she is a  and the end of the year is always very busy.     Taking alpha lipoic acid, and 5000 units daily vitamin D.     Walks intermittently.     Medications:     Current Outpatient Medications:      cholecalciferol (VITAMIN D3) 125 mcg (5000  units) capsule, Take 1 capsule (125 mcg) by mouth daily, Disp: 90 capsule, Rfl: 3     lisinopril (ZESTRIL) 10 MG tablet, Take 1 tablet (10 mg) by mouth daily, Disp: 90 tablet, Rfl: 1     LORazepam (ATIVAN) 0.5 MG tablet, Take 0.5-1 mg by mouth daily as needed (Patient not taking: Reported on 4/26/2023), Disp: , Rfl:      oxybutynin ER (DITROPAN XL) 10 MG 24 hr tablet, TAKE 1 TABLET(10 MG) BY MOUTH DAILY (Patient taking differently: 10 mg At Bedtime), Disp: 30 tablet, Rfl: 11     oxyCODONE (ROXICODONE) 5 MG tablet, Take 1 tablet (5 mg) by mouth every 3 hours as needed for moderate pain (4-6), Disp: 45 tablet, Rfl: 0     Ozanimod HCl (ZEPOSIA) 0.92 MG CAPS, Take 0.92 mg by mouth daily (Patient taking differently: Take 0.92 mg by mouth At Bedtime), Disp: 30 capsule, Rfl: 11     PARoxetine (PAXIL) 30 MG tablet, Take 30 mg by mouth every evening, Disp: , Rfl:     Exam: /85 (BP Location: Right arm, Patient Position: Sitting, Cuff Size: Adult Regular)   Pulse 75   Wt 83.6 kg (184 lb 4.8 oz)   SpO2 97%   BMI 30.67 kg/m    Constitutional: Alert. No acute distress.   Head: Atraumatic, normocephalic.  ENT: Moist mucous membranes. No sinus drainage.  Cardiovascular: Appears warm, well-perfused, and non-toxic.   Respiratory: No increased work of breathing, no accessory muscle use.   Skin: No rashes or lesions appreciated on visualized skin.   Hematologic/Lymphatic/Immunologic: No bruising appreciated on visualized skin.     Neurologic:  Mental Status: Alert. Speech is fluent, able to comprehend, and follows commands. No dysarthria.  Cranial nerves: EOMI. Pupils reactive to light. Right eye with finger wiggle at 6 inches, and color desaturation. Visual fields in left eye intact. Smile and eyebrow raise equal, blinking is symmetric. Tongue midline. Hearing intact to casual conversation.   Motor:  Manual muscle testing revealed the following MRC grade muscle power:   Right Left   Shoulder abduction:  5 5   Elbow Flexion:  5 5   Elbow Extension:  5 5   Wrist Extension:  5 5   Finger Extension:  5 5   FDI 5 5   Finger Flexion 5 5   Wrist Flexion 5 5   Hip Flexion 4+ 4+   Knee Extension 5 5   Knee Flexion 5 5   Dorsiflexion 5 5-   Plantar flexion 5 5       Sensory: Romberg with sway. Decreased vibration R>L.  Reflexes: Reflexes are symmetric at biceps, brachioradialis, knees and ankles. No clonus.  Coordination: No appendicular ataxia on finger to nose testing. HTS intact.   Gait: Gait is wide based and valgus at ankle. Difficulty standing in tandem. Difficulty with tip toe and heel walk.      Data:     Vitamin D Deficiency Screening Results:  Lab Results   Component Value Date    VITDT 20 11/10/2022    VITDT 37 10/12/2021     CBC RESULTS:   Recent Labs   Lab Test 12/21/22  0736   WBC 10.9   RBC 4.34   HGB 13.4   HCT 40.1   MCV 92   MCH 30.9   MCHC 33.4   RDW 13.1           Imaging:   No new imaging since last visit.      Assessment/Plan:   # multiple sclerosis   # urinary urgency   # gait instability   # meningioma    -imaging scheduled for next week (delayed when she had covid), will follow  -continue zeposia  (instructed to call clinic if issues obtaining medication in the next month)  -continue oxybutynin   -counseled on exercise recommendations  -CBC, LFTs, vit D level today  -return 6 months       Patient seen and discussed with attending neurologist Dr. Kimberly Brooks MD  PGY4 Neurology    Attending physician: I saw and evaluated the patient with Dr. Brooks, and I agree with her findings and plan of care as documented above.    The patient remains clinically stable as regards any evidence of active inflammatory demyelination on current disease modifying therapy with ozanimod. We will review MRI scan due to be performed next week to ensure that radiologic stability is also maintained.    I will continue oxybutynin XL 10 mg daily, which appears to be adequately controlling her symptoms with avoidance of triggers  such as caffeine.    Today, I will check routine laboratory studies for monitoring of therapy with ozanimod, including CBC with differential and hepatic panel.    I will also check a vitamin D level to follow up on previous finding of vitamin D deficiency, and advise her on supplementation with vitamin D as needed to maintain a level within our goal range of 60-80 mcg/L.     Daniel Harris MD   of Neurology  [facility]    Diagnoses:  1) MS  2) Urinary urgency  3) Vitamin D deficiency    Cc:  Margarita Suazo DNP (PCP)  Patient

## 2023-05-11 NOTE — PATIENT INSTRUCTIONS
Continue Zeposia    2.   Continue oxybutynin XL 10 mg daily     3.   Blood tests today    4.   Return to clinic in 6 months

## 2023-05-11 NOTE — NURSING NOTE
Chief Complaint   Patient presents with     MS     RECHECK     6 month follow up      Vitals were taken and medications were reconciled.   Ramez Acosta, EMT  9:21 AM

## 2023-05-15 ENCOUNTER — OFFICE VISIT (OUTPATIENT)
Dept: RADIATION ONCOLOGY | Facility: CLINIC | Age: 58
End: 2023-05-15
Attending: NURSE PRACTITIONER
Payer: COMMERCIAL

## 2023-05-15 ENCOUNTER — HOSPITAL ENCOUNTER (OUTPATIENT)
Dept: MRI IMAGING | Facility: CLINIC | Age: 58
Discharge: HOME | End: 2023-05-15
Attending: NURSE PRACTITIONER | Admitting: NURSE PRACTITIONER
Payer: COMMERCIAL

## 2023-05-15 VITALS
WEIGHT: 188 LBS | DIASTOLIC BLOOD PRESSURE: 76 MMHG | HEART RATE: 62 BPM | SYSTOLIC BLOOD PRESSURE: 114 MMHG | BODY MASS INDEX: 31.28 KG/M2

## 2023-05-15 DIAGNOSIS — D32.0 PRIMARY MENINGIOMA OF OPTIC NERVE SHEATH (H): ICD-10-CM

## 2023-05-15 DIAGNOSIS — D32.0 PRIMARY MENINGIOMA OF OPTIC NERVE SHEATH (H): Primary | ICD-10-CM

## 2023-05-15 PROCEDURE — G0463 HOSPITAL OUTPT CLINIC VISIT: HCPCS | Performed by: NURSE PRACTITIONER

## 2023-05-15 PROCEDURE — 99213 OFFICE O/P EST LOW 20 MIN: CPT | Performed by: NURSE PRACTITIONER

## 2023-05-15 PROCEDURE — 70553 MRI BRAIN STEM W/O & W/DYE: CPT

## 2023-05-15 PROCEDURE — A9585 GADOBUTROL INJECTION: HCPCS | Performed by: NURSE PRACTITIONER

## 2023-05-15 PROCEDURE — 70553 MRI BRAIN STEM W/O & W/DYE: CPT | Mod: 26 | Performed by: RADIOLOGY

## 2023-05-15 PROCEDURE — 255N000002 HC RX 255 OP 636: Performed by: NURSE PRACTITIONER

## 2023-05-15 RX ORDER — GADOBUTROL 604.72 MG/ML
8 INJECTION INTRAVENOUS ONCE
Status: COMPLETED | OUTPATIENT
Start: 2023-05-15 | End: 2023-05-15

## 2023-05-15 RX ADMIN — GADOBUTROL 8 ML: 604.72 INJECTION INTRAVENOUS at 08:12

## 2023-05-15 NOTE — PROGRESS NOTES
FOLLOW-UP VISIT    Patient Name: Shakila Kapoor      : 1965     Age: 57 year old        ______________________________________________________________________________     Chief Complaint   Patient presents with     Cancer     Follow up:Menigioma:Right optic nerve 5040 cGy completed 21       Pain  Denies    Labs  Other Labs: No    Imaging  MRI: today        Other Appointments:     MD Name:  Appointment Date:    MD Name: Appointment Date:   MD Name: Appointment Date:   Other Appointment Notes:     Residual Radiation side effect: No concerns

## 2023-05-15 NOTE — PROGRESS NOTES
Department of Radiation Oncology  [facility]  [address] [place] [address]  [phone]       Radiation Oncology Video Follow-up Visit  May 15, 2023    Shakila Kapoor  MRN: [id number]   : 1965     DISEASE TREATED:   Right optic nerve sheath meningioma    RADIATION THERAPY DELIVERED:   Right optic nerve: 5040 cGy in 28 fractions, from 3/8/2021 - 2021    SYSTEMIC THERAPY:  None    INTERVAL SINCE COMPLETION OF RADIATION THERAPY:   2 years    SUBJECTIVE:   Ms. Kapoor is a 56 year old female with a PMH significant for relapsing multiple sclerosis initially diagnosed at age 21. She was diagnosed with a right optic nerve sheath meningioma in 2021 with imaging demonstrating thickening and enhancement of the right optic nerve sheath beginning just proximal to the entrance to the optic canal and extending into the intraorbital segment near the orbital apex. She underwent fractionated external beam radiation therapy as described above.    Ms. Kapoor returns to radiation oncology clinic today for a routine posttreatment disease surveillance visit.  Her MS has remained clinically stable recently and she continues to follow with neurology and neurosurgery. MR brain and orbits on 2022 demonstrated slight decrease in size and contrast enhancement of her presumed right optic nerve sheath meningioma.     Overall, she reports that she is doing well and has no pressing concerns or complaints.  She reports her right eye vision such that she is now able to see shapes and outlines, but it is nonfunctional. No change in vision in the last year. 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  She is due to see neuro ophthalmology in September.     .     OBJECTIVE:  PHYSICAL EXAM (video):  General: Healthy-appearing 56-year-old female seated comfortably in no acute distress    LABS AND IMAGIN/15/2023 MRI Brain and Orbits  IMPRESSION:  1. No substantial change in right  optic nerve meningioma.  2. No substantial change of sequela of demyelinating disease. No MRI  findings of active demyelination.    IMPRESSION:   Ms. Kapoor is a 56 year old female with a right optic nerve sheath meningioma. She is about two years out from the completion of radiotherapy with stable clinical and radiographic findings.    PLAN:   1. Follow-up in clinic with MRI brain in 1 year  2. Continue follow-up with Dr. Soto in ophthalmology  3. We asked the patient to have labs drawn at her convenience for assessment of pituitary function (6/1/2023)      Gwendolyn Cody NP  Dept of Radiation Oncology  [facility]

## 2023-05-15 NOTE — LETTER
5/15/2023         RE: Shakila Kapoor  [address] [place] [address]        Dear Colleague,    Thank you for referring your patient, Shakila Kapoor, to the [facility] RADIATION ONCOLOGY. Please see a copy of my visit note below.    FOLLOW-UP VISIT    Patient Name: Shakila Kapoor      : 1965     Age: 57 year old        ______________________________________________________________________________     Chief Complaint   Patient presents with    Cancer     Follow up:Menigioma:Right optic nerve 5040 cGy completed 21       Pain  Denies    Labs  Other Labs: No    Imaging  MRI: today        Other Appointments:     MD Name:  Appointment Date:    MD Name: Appointment Date:   MD Name: Appointment Date:   Other Appointment Notes:     Residual Radiation side effect: No concerns                Department of Radiation Oncology  [address] [address]  [phone]       Radiation Oncology Video Follow-up Visit  May 15, 2023    Shakila Kapoor  MRN: [id number]   : 1965     DISEASE TREATED:   Right optic nerve sheath meningioma    RADIATION THERAPY DELIVERED:   Right optic nerve: 5040 cGy in 28 fractions, from 3/8/2021 - 2021    SYSTEMIC THERAPY:  None    INTERVAL SINCE COMPLETION OF RADIATION THERAPY:   2 years    SUBJECTIVE:   Ms. Kapoor is a 56 year old female with a PMH significant for relapsing multiple sclerosis initially diagnosed at age 21. She was diagnosed with a right optic nerve sheath meningioma in 2021 with imaging demonstrating thickening and enhancement of the right optic nerve sheath beginning just proximal to the entrance to the optic canal and extending into the intraorbital segment near the orbital apex. She underwent fractionated external beam radiation therapy as described above.    Ms. Kapoor returns to radiation oncology clinic today for a routine posttreatment disease surveillance  visit.  Her MS has remained clinically stable recently and she continues to follow with neurology and neurosurgery. MR brain and orbits on 2022 demonstrated slight decrease in size and contrast enhancement of her presumed right optic nerve sheath meningioma.     Overall, she reports that she is doing well and has no pressing concerns or complaints.  She reports her right eye vision such that she is now able to see shapes and outlines, but it is nonfunctional. No change in vision in the last year. 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  She is due to see neuro ophthalmology in September.     .     OBJECTIVE:  PHYSICAL EXAM (video):  General: Healthy-appearing 56-year-old female seated comfortably in no acute distress    LABS AND IMAGIN/15/2023 MRI Brain and Orbits  IMPRESSION:  1. No substantial change in right optic nerve meningioma.  2. No substantial change of sequela of demyelinating disease. No MRI  findings of active demyelination.    IMPRESSION:   Ms. Kapoor is a 56 year old female with a right optic nerve sheath meningioma. She is about two years out from the completion of radiotherapy with stable clinical and radiographic findings.    PLAN:   1. Follow-up in clinic with MRI brain in 1 year  2. Continue follow-up with Dr. Soto in ophthalmology  3. We asked the patient to have labs drawn at her convenience for assessment of pituitary function (2023)      Gwendolyn Cody NP  Dept of Radiation Oncology  [facility]

## 2023-05-19 ENCOUNTER — MYC MEDICAL ADVICE (OUTPATIENT)
Dept: NEUROLOGY | Facility: CLINIC | Age: 58
End: 2023-05-19
Payer: COMMERCIAL

## 2023-06-01 ENCOUNTER — OFFICE VISIT (OUTPATIENT)
Dept: OBGYN | Facility: CLINIC | Age: 58
End: 2023-06-01
Payer: COMMERCIAL

## 2023-06-01 ENCOUNTER — LAB (OUTPATIENT)
Dept: LAB | Facility: CLINIC | Age: 58
End: 2023-06-01
Payer: COMMERCIAL

## 2023-06-01 VITALS
BODY MASS INDEX: 31.62 KG/M2 | SYSTOLIC BLOOD PRESSURE: 135 MMHG | TEMPERATURE: 97.4 F | DIASTOLIC BLOOD PRESSURE: 70 MMHG | WEIGHT: 190 LBS | HEART RATE: 86 BPM

## 2023-06-01 DIAGNOSIS — B97.7 HIGH RISK HPV INFECTION: ICD-10-CM

## 2023-06-01 DIAGNOSIS — D32.0 PRIMARY MENINGIOMA OF OPTIC NERVE SHEATH (H): ICD-10-CM

## 2023-06-01 DIAGNOSIS — Z01.419 ENCOUNTER FOR GYNECOLOGICAL EXAMINATION WITHOUT ABNORMAL FINDING: Primary | ICD-10-CM

## 2023-06-01 DIAGNOSIS — N95.2 VAGINAL ATROPHY: ICD-10-CM

## 2023-06-01 LAB
ANION GAP SERPL CALCULATED.3IONS-SCNC: 13 MMOL/L (ref 7–15)
BUN SERPL-MCNC: 12 MG/DL (ref 6–20)
CALCIUM SERPL-MCNC: 9.2 MG/DL (ref 8.6–10)
CHLORIDE SERPL-SCNC: 106 MMOL/L (ref 98–107)
CORTIS SERPL-MCNC: 8.8 UG/DL
CREAT SERPL-MCNC: 0.77 MG/DL (ref 0.51–0.95)
DEPRECATED HCO3 PLAS-SCNC: 23 MMOL/L (ref 22–29)
FSH SERPL IRP2-ACNC: 46.5 MIU/ML
GFR SERPL CREATININE-BSD FRML MDRD: >90 ML/MIN/1.73M2
GLUCOSE SERPL-MCNC: 100 MG/DL (ref 70–99)
LH SERPL-ACNC: 32.3 MIU/ML
POTASSIUM SERPL-SCNC: 4 MMOL/L (ref 3.4–5.3)
PROLACTIN SERPL 3RD IS-MCNC: 23 NG/ML (ref 5–23)
SODIUM SERPL-SCNC: 142 MMOL/L (ref 136–145)
T4 FREE SERPL-MCNC: 0.91 NG/DL (ref 0.9–1.7)
TSH SERPL DL<=0.005 MIU/L-ACNC: 1.85 UIU/ML (ref 0.3–4.2)

## 2023-06-01 PROCEDURE — 84439 ASSAY OF FREE THYROXINE: CPT

## 2023-06-01 PROCEDURE — G0145 SCR C/V CYTO,THINLAYER,RESCR: HCPCS | Performed by: OBSTETRICS & GYNECOLOGY

## 2023-06-01 PROCEDURE — 83002 ASSAY OF GONADOTROPIN (LH): CPT

## 2023-06-01 PROCEDURE — 84443 ASSAY THYROID STIM HORMONE: CPT

## 2023-06-01 PROCEDURE — 87624 HPV HI-RISK TYP POOLED RSLT: CPT | Performed by: OBSTETRICS & GYNECOLOGY

## 2023-06-01 PROCEDURE — 99203 OFFICE O/P NEW LOW 30 MIN: CPT | Mod: 25 | Performed by: OBSTETRICS & GYNECOLOGY

## 2023-06-01 PROCEDURE — 82533 TOTAL CORTISOL: CPT

## 2023-06-01 PROCEDURE — 83001 ASSAY OF GONADOTROPIN (FSH): CPT

## 2023-06-01 PROCEDURE — 84146 ASSAY OF PROLACTIN: CPT

## 2023-06-01 PROCEDURE — 36415 COLL VENOUS BLD VENIPUNCTURE: CPT

## 2023-06-01 PROCEDURE — 80048 BASIC METABOLIC PNL TOTAL CA: CPT

## 2023-06-01 PROCEDURE — 99386 PREV VISIT NEW AGE 40-64: CPT | Performed by: OBSTETRICS & GYNECOLOGY

## 2023-06-01 RX ORDER — ESTRADIOL 10 UG/1
INSERT VAGINAL
Qty: 36 TABLET | Refills: 0 | Status: SHIPPED | OUTPATIENT
Start: 2023-06-01 | End: 2023-08-30

## 2023-06-01 NOTE — PROGRESS NOTES
Shakila is a 57 year old   who presents for annual exam.   Postmenopausal since age 55.  She is having vaginal dryness. No vaginal bleeding noted.     Besides routine health maintenance,  she would like to discuss Hormone levels. Has no sexual interest. Having pain with sex. Causing some relationship problems. Menopause 3-4 years ago.   No hot flashes. No sleep disruption. Does have fatigue, but also has MS. 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    x 3. Vacuum needed for two. Doesn't recall bad lacerations.   GYNECOLOGIC HISTORY:  Menarche: 15   She is sexually active with 1male partner(s) and she is currently in monogamous relationship.    History sexually transmitted infections:No STD history  STI testing offered?  Declined  Estrogen replacement therapy: No  DOMINICK exposure: No    History of abnormal Pap smear: NO - age 30- 65 PAP every 3 years recommended  Family history of breast CA: No  Family history of uterine/ovarian CA: No  Family history of colon CA: unclem aunt and great grandfather    HEALTH MAINTENANCE:  Cholesterol: (No components found for: CHOL2 ) History of abnormal lipids: No  Mammo: 2022 . History of abnormal Mammo: No  Regular Self Breast Exams: occ  Colonoscopy: 2019 History of abnormal Colonoscopy: No  Dexa: no History of abnormal Dexa: No  Calcium/Vitamin D intake: source:  dairy, dietary supplement(s), veggies Adequate? Yes  TSH: (No components found for: TSH1 )  Pap; (No results found for: PAP )    HISTORY:  OB History    Para Term  AB Living   4 3 3 0 1 0   SAB IAB Ectopic Multiple Live Births   1 0 0 0 0      # Outcome Date GA Lbr Hector/2nd Weight Sex Delivery Anes PTL Lv   4 Term 03/10/03   4.525 kg (9 lb 15.6 oz) F          Name: Marry   3 SAB 2002 11w0d          2 Term 2000 39w0d  4.224 kg (9 lb 5 oz) M Vag-Vacuum         Name: Cecilio   1 Term 1998 39w0d  3.884 kg (8 lb 9 oz) F Vag-Vacuum         Name: Shama     Past Medical History:   Diagnosis Date      Benign essential hypertension 5/25/2022     Meningioma (H)      Mixed hyperlipidemia 5/26/2022     Multiple sclerosis (H)      Past Surgical History:   Procedure Laterality Date     DISCECTOMY, FUSION CERVICAL ANTERIOR TWO LEVELS, COMBINED N/A 12/20/2022    Procedure: Anterior Cervical 4-6 Fusion with interbody cages and cervical plate;  Surgeon: Be Solorzano MD;  Location:  OR     Family History   Problem Relation Age of Onset     Bipolar Disorder Mother      Hypertension Father      Lung Cancer Maternal Grandmother      Lung Cancer Maternal Grandfather      Lung Cancer Paternal Grandmother      Hirschsprung's Disease Son      Anxiety Disorder Daughter      Glaucoma No family hx of      Macular Degeneration No family hx of      Social History     Socioeconomic History     Marital status:    Tobacco Use     Smoking status: Never     Smokeless tobacco: Never   Substance and Sexual Activity     Alcohol use: Not Currently     Comment: rarely     Drug use: Never       Current Outpatient Medications:      cholecalciferol (VITAMIN D3) 125 mcg (5000 units) capsule, Take 1 capsule (125 mcg) by mouth daily, Disp: 90 capsule, Rfl: 3     lisinopril (ZESTRIL) 10 MG tablet, Take 1 tablet (10 mg) by mouth daily, Disp: 90 tablet, Rfl: 1     oxybutynin ER (DITROPAN XL) 10 MG 24 hr tablet, TAKE 1 TABLET(10 MG) BY MOUTH DAILY (Patient taking differently: 10 mg At Bedtime), Disp: 30 tablet, Rfl: 11     Ozanimod HCl (ZEPOSIA) 0.92 MG CAPS, Take 0.92 mg by mouth daily (Patient taking differently: Take 0.92 mg by mouth At Bedtime), Disp: 30 capsule, Rfl: 11     PARoxetine (PAXIL) 30 MG tablet, Take 30 mg by mouth every evening, Disp: , Rfl:      LORazepam (ATIVAN) 0.5 MG tablet, Take 0.5-1 mg by mouth daily as needed (Patient not taking: Reported on 4/26/2023), Disp: , Rfl:      Allergies   Allergen Reactions     Penicillins      Other reaction(s): *Unknown       Past medical, surgical, social and family history were reviewed  and updated in EPIC.    ROS:   C:       NEGATIVE for fever, chills, change in weight  I:         NEGATIVE for worrisome rashes, moles or lesions  E:       NEGATIVE for vision changes or irritation  E/M:   NEGATIVE for ear, mouth and throat problems  R:       NEGATIVE for significant cough or SOB  CV:     NEGATIVE for chest pain, palpitations or peripheral edema  GI:      NEGATIVE for nausea, abdominal pain, heartburn, or change in bowel habits  :    NEGATIVE for frequency, dysuria, hematuria, vaginal discharge, or bleeding  M:       NEGATIVE for significant arthralgias or myalgia  N:       NEGATIVE for weakness, dizziness or paresthesias  E:       NEGATIVE for temperature intolerance, skin/hair changes  P:       NEGATIVE for changes in mood or affect    EXAM:  /70   Pulse 86   Temp 97.4  F (36.3  C)   Wt 86.2 kg (190 lb)   BMI 31.62 kg/m     BMI: Body mass index is 31.62 kg/m .  Constitutional: healthy, alert and no distress  Head: Normocephalic. No masses, lesions, tenderness or abnormalities  Neck: Neck supple. Trachea midline. No adenopathy. Thyroid symmetric, normal size.   Cardiovascular: RRR.   Respiratory: Negative.   Breast: Breasts reveal mild symmetric fibrocystic densities, but there are no dominant, discrete, fixed or suspicious masses found.  Gastrointestinal: Abdomen soft, non-tender, non-distended. No masses, organomegaly  :  Vulva:  No external lesions, normal female hair distribution, no inguinal adenopathy.    Urethra:  Midline, non-tender, well supported, no discharge  Vagina:  Atrophic, no abnormal discharge, no lesions  Uterus:  Normal size , non-tender, freely mobile  Ovaries:  No masses appreciated, non-tender, mobile  Rectal Exam: deferred  Musculoskeletal: extremities normal  Skin: no suspicious lesions or rashes  Psychiatric: Affect appropriate, cooperative,mentation appears normal.     COUNSELING:   Reviewed preventive health counseling, as reflected in patient instructions        Regular exercise       Healthy diet/nutrition       Osteoporosis prevention/bone health       (Jennifer)menopause management   reports that she has never smoked. She has never used smokeless tobacco.    Body mass index is 31.62 kg/m .  Weight management plan: Discussed healthy diet and exercise guidelines    FRAX Risk Assessment  ASSESSMENT:  57 year old  with satisfactory annual exam  (Z01.419) Encounter for gynecological examination without abnormal finding  (primary encounter diagnosis)  Comment:   Plan: Pap today     (B97.7) High risk HPV infection  Comment:   Plan: Pap diagnostic with HPV, HPV Hold (Lab Only),         HPV High Risk Types DNA Cervical            (N95.2) Vaginal atrophy  Comment:   Plan: estradiol (VAGIFEM) 10 MCG TABS vaginal tablet        Discussed vaginal atrophy and low libido.   Discussed vaginal atrophy on exam, which leads to pain with sex.   Discussed effects of sexual pain on libido.   Discussed strategies for increasing libido, starting with resolving sexual pain. First step is treating vaginal atrophy, which may be sufficient.   Discussed psychosocial components of low libido.   Recommend RTC in 3 months.

## 2023-06-02 DIAGNOSIS — N31.9 NEUROGENIC BLADDER: ICD-10-CM

## 2023-06-02 RX ORDER — OXYBUTYNIN CHLORIDE 10 MG/1
TABLET, EXTENDED RELEASE ORAL
Qty: 90 TABLET | Refills: 3 | Status: SHIPPED | OUTPATIENT
Start: 2023-06-02 | End: 2024-01-11

## 2023-06-02 NOTE — TELEPHONE ENCOUNTER
Received refill request for oxybutynin from [facility]; Patient was last seen in May 2023 and has follow up appointment in November with Dr Harris. Refilled per MS refill protocol.    Flower Barnett RN

## 2023-06-06 LAB
BKR LAB AP GYN ADEQUACY: NORMAL
BKR LAB AP GYN INTERPRETATION: NORMAL
BKR LAB AP HPV REFLEX: NORMAL
BKR LAB AP PREVIOUS ABNL DX: NORMAL
BKR LAB AP PREVIOUS ABNORMAL: NORMAL
PATH REPORT.COMMENTS IMP SPEC: NORMAL
PATH REPORT.COMMENTS IMP SPEC: NORMAL
PATH REPORT.RELEVANT HX SPEC: NORMAL

## 2023-06-08 ENCOUNTER — TELEPHONE (OUTPATIENT)
Dept: NEUROSURGERY | Facility: CLINIC | Age: 58
End: 2023-06-08
Payer: COMMERCIAL

## 2023-06-08 LAB
HUMAN PAPILLOMA VIRUS 16 DNA: NEGATIVE
HUMAN PAPILLOMA VIRUS 18 DNA: NEGATIVE
HUMAN PAPILLOMA VIRUS FINAL DIAGNOSIS: NORMAL
HUMAN PAPILLOMA VIRUS OTHER HR: NEGATIVE

## 2023-06-16 ENCOUNTER — ANCILLARY PROCEDURE (OUTPATIENT)
Dept: GENERAL RADIOLOGY | Facility: CLINIC | Age: 58
End: 2023-06-16
Attending: NEUROLOGICAL SURGERY
Payer: COMMERCIAL

## 2023-06-16 ENCOUNTER — OFFICE VISIT (OUTPATIENT)
Dept: NEUROSURGERY | Facility: CLINIC | Age: 58
End: 2023-06-16
Payer: COMMERCIAL

## 2023-06-16 VITALS
BODY MASS INDEX: 31.75 KG/M2 | SYSTOLIC BLOOD PRESSURE: 115 MMHG | HEIGHT: 65 IN | DIASTOLIC BLOOD PRESSURE: 78 MMHG | OXYGEN SATURATION: 96 % | HEART RATE: 73 BPM | WEIGHT: 190.6 LBS

## 2023-06-16 DIAGNOSIS — Z98.1 S/P CERVICAL SPINAL FUSION: Primary | ICD-10-CM

## 2023-06-16 DIAGNOSIS — Z98.1 S/P CERVICAL SPINAL FUSION: ICD-10-CM

## 2023-06-16 PROCEDURE — 72040 X-RAY EXAM NECK SPINE 2-3 VW: CPT | Performed by: STUDENT IN AN ORGANIZED HEALTH CARE EDUCATION/TRAINING PROGRAM

## 2023-06-16 PROCEDURE — 99213 OFFICE O/P EST LOW 20 MIN: CPT | Performed by: NEUROLOGICAL SURGERY

## 2023-06-16 ASSESSMENT — PAIN SCALES - GENERAL: PAINLEVEL: NO PAIN (0)

## 2023-06-16 NOTE — PROGRESS NOTES
Date of service: 6/16/2023     Shakila Kapoor is a 57 year old female who is 6 months s/p C4-6 ACDF.     Today, she reports no pain.  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     Neuro intact.     I have personally reviewed the cervical spine Xrays which show a stable construct.     Impression:  Shakila is doing very well.  No issues to report today.  I will see her back in 6 months with cervical spine CT.     Be Solorzano M.D.

## 2023-06-16 NOTE — NURSING NOTE
Chief Complaint   Patient presents with     RECHECK     6 month follow up      Vitals were taken and medications were reconciled.   Ramez Acosta, EMT  8:53 AM

## 2023-06-16 NOTE — LETTER
6/16/2023       RE: Shakila Kapoor  [address] [place] [address]     Dear Colleague,    Thank you for referring your patient, Shakila Kapoor, to the [facility] NEUROSURGERY [facility]IC [place] [facility]. Please see a copy of my visit note below.    Date of service: 6/16/2023     Shakila Kapoor is a 57 year old female who is 6 months s/p C4-6 ACDF.     Today, she reports no pain.  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     Neuro intact.     I have personally reviewed the cervical spine Xrays which show a stable construct.     Impression:  Shakila is doing very well.  No issues to report today.  I will see her back in 6 months with cervical spine CT.     Be Solorzano M.D.

## 2023-08-14 DIAGNOSIS — G35 MULTIPLE SCLEROSIS (H): ICD-10-CM

## 2023-08-15 RX ORDER — OZANIMOD HYDROCHLORIDE 0.92 MG/1
1 CAPSULE ORAL DAILY
Qty: 30 CAPSULE | Refills: 11 | Status: SHIPPED | OUTPATIENT
Start: 2023-08-15 | End: 2024-05-09

## 2023-08-15 NOTE — TELEPHONE ENCOUNTER
Received refill request for Zeposia from [facility]; Patient was last seen in Nov 2022 and has follow up appointment in Nov 2023 with Dr Harris. Refilled per MS refill protocol.    Flower Barnett RN

## 2023-08-21 DIAGNOSIS — I10 BENIGN ESSENTIAL HYPERTENSION: ICD-10-CM

## 2023-08-21 DIAGNOSIS — N95.2 VAGINAL ATROPHY: ICD-10-CM

## 2023-08-21 RX ORDER — LISINOPRIL 10 MG/1
10 TABLET ORAL DAILY
Qty: 90 TABLET | Refills: 0 | Status: SHIPPED | OUTPATIENT
Start: 2023-08-21 | End: 2023-11-29

## 2023-08-21 RX ORDER — ESTRADIOL 10 UG/1
INSERT VAGINAL
Qty: 36 TABLET | Refills: 0 | OUTPATIENT
Start: 2023-08-21 | End: 2023-11-19

## 2023-08-29 ENCOUNTER — TELEPHONE (OUTPATIENT)
Dept: NEUROLOGY | Facility: CLINIC | Age: 58
End: 2023-08-29

## 2023-08-29 NOTE — LETTER
2023      [employer]   Attn: Appeal Coordinator   Fax: [phone]      RE: Shakila Kapoor   : 1965   Prior Authorization Ref Number: [id number]   Zeposia appeal      To Whom It May Concern:    I am writing on behalf of my patient, Shakila Kapoor, to document the medical necessity of Zeposia for the treatment of multiple sclerosis. This letter provides information about the patient's medication history and diagnosis and a statement summarizing my treatment rationale.    You have previously denied Zeposia, stating that Ms. Kapoor has not tried and failed one sphingosine-1-phosphate receptor modulator, such as fingolimod or siponimod.  I will remind you that Zeposia is a FDA-approved treatment for multiple sclerosis. There is no medical or safety basis as to why you are denying coverage of Zeposia. Ms. Kapoor has been stable on Zeposia since  so there is no medical justification for switching her therapy at this time. Although fingolimod and siponimod have similar mechanisms of actions as Zeposia, their individual risk profiles are not identical. Therefore, changing from a well-tolerated, effective medication to a different treatment is simply not in Ms. Kapoor's best interest.     In order to continue to provide safe, effective care for Ms. Kapoor, and to provide her with the best chance for maintaining her ability to be a functioning, contributing member of society, I urge you to cover Zeposia for Shakila. Please contact our office with any questions or concerns.         Sincerely,        Daniel Harris MD   [facility]  [address] [place] [address]  Phone: [phone]  Fax: [phone]

## 2023-08-29 NOTE — LETTER
2023    RE: Shakila Kapoor   : 1965   Zeposia second level appeal      Second level appeal request:    I write on behalf of my patient Shakila Kapoor ( 1965) to request a second level appeal, with external review, of the denial of ozanimod (Zeposia) for the treatment of relapsing remitting multiple sclerosis. I write as both Ms. Kapoor's neurologist and as a specialist in the management of multiple sclerosis.    Ms. Kapoor has been clinically and radiologically stable on Zeposia since this treatment was initiated in 2021, after failing previous treatment with Rebif. In addition, the patient has tolerated treatment with Zeposia without adverse effects. On 2023, the patient's insurance plan decided to deny continued treatment with Zeposia, stating that the patient must fail Gilenya/fingolimod or Mayzent/siponimod. An appeal was submitted, which was also denied on 2023.    I will remind you that Zeposia is an FDA-approved treatment for multiple sclerosis. There is no medical or safety basis as to why the patient's insurance plan is denying continued coverage of Zeposia. Ms. Kapoor has been stable on Zeposia since  and there is no medical justification for switching her therapy at this time. Although fingolimod and siponimod have similar mechanisms of actions to ozanimod, their individual risk profiles are not identical. Therefore, changing from a well-tolerated, effective medication to a different treatment is simply not in Ms. Kapoor's best interest.     In order to continue to provide safe, effective care for Ms. Kapoor and to provide her with the best chance of continued disease control, I request the denial be overturned. Please contact my office with any questions or concerns.         Sincerely,    Daniel Harris MD   [facility]  [address] [place] [address]  Phone: [phone]  Fax:  [phone]

## 2023-08-29 NOTE — TELEPHONE ENCOUNTER
PA Initiation    Medication: ZEPOSIA 0.92 MG PO CAPS  Insurance Company: [employer] - Phone [phone] Fax [phone]  Pharmacy Filling the Rx: [facility] - [place], [place] - [address]  Filling Pharmacy Phone:    Filling Pharmacy Fax:    Start Date: 8/29/2023        Thank you,    Jasmyn Garcia [facility]  Specialty Pharmacy Clinic Liaison - [facility] [address]  [address] [place] [address]  Ph: [phone] Fax: [phone]  [email or web address]

## 2023-08-30 NOTE — TELEPHONE ENCOUNTER
PRIOR AUTHORIZATION DENIED    Medication: ZEPOSIA 0.92 MG PO CAPS  Insurance Company: [employer] - Phone [phone] Fax [phone]  Denial Date: 8/29/2023  Denial Rational:   Appeal Information:     Patient Notified:                  Thank you,    Jasmyn Garcia h-T  Specialty Pharmacy Clinic Liaison - CardiologyNeurologyMultiple Sclerosis  [facility] [address]  [address] [place] [address]  Ph: [phone] Fax: [phone]  [email or web address]

## 2023-09-10 ENCOUNTER — MYC MEDICAL ADVICE (OUTPATIENT)
Dept: OBGYN | Facility: CLINIC | Age: 58
End: 2023-09-10
Payer: COMMERCIAL

## 2023-09-10 DIAGNOSIS — N95.2 VAGINAL ATROPHY: Primary | ICD-10-CM

## 2023-09-11 NOTE — TELEPHONE ENCOUNTER
Pt seen for annual in 06/2023  (N95.2) Vaginal atrophy  Comment:   Plan: estradiol (VAGIFEM) 10 MCG TABS vaginal tablet        Discussed vaginal atrophy and low libido.   Discussed vaginal atrophy on exam, which leads to pain with sex.   Discussed effects of sexual pain on libido.   Discussed strategies for increasing libido, starting with resolving sexual pain. First step is treating vaginal atrophy, which may be sufficient.   Discussed psychosocial components of low libido.   Recommend RTC in 3 months.     Pt informed to schedule follow up visit, order pended from previous vagifem to get until visit- unsure if need to change the sig from the initial daily for 14 days?  Routing to provider to advise.  Rachelle Torres RN on 9/11/2023 at 9:24 AM

## 2023-09-12 RX ORDER — ESTRADIOL 10 UG/1
10 INSERT VAGINAL
Qty: 24 TABLET | Refills: 3 | Status: SHIPPED | OUTPATIENT
Start: 2023-09-14 | End: 2024-06-13

## 2023-09-14 ENCOUNTER — OFFICE VISIT (OUTPATIENT)
Dept: FAMILY MEDICINE | Facility: CLINIC | Age: 58
End: 2023-09-14
Payer: COMMERCIAL

## 2023-09-14 VITALS
SYSTOLIC BLOOD PRESSURE: 136 MMHG | RESPIRATION RATE: 15 BRPM | HEART RATE: 96 BPM | BODY MASS INDEX: 30.82 KG/M2 | OXYGEN SATURATION: 97 % | WEIGHT: 185 LBS | DIASTOLIC BLOOD PRESSURE: 88 MMHG | HEIGHT: 65 IN | TEMPERATURE: 97.5 F

## 2023-09-14 DIAGNOSIS — N39.0 ACUTE UTI (URINARY TRACT INFECTION): ICD-10-CM

## 2023-09-14 DIAGNOSIS — G35 MULTIPLE SCLEROSIS (H): ICD-10-CM

## 2023-09-14 DIAGNOSIS — N95.2 ATROPHIC VAGINITIS: ICD-10-CM

## 2023-09-14 DIAGNOSIS — R30.0 DYSURIA: Primary | ICD-10-CM

## 2023-09-14 LAB
ALBUMIN UR-MCNC: 30 MG/DL
APPEARANCE UR: CLEAR
BACTERIA #/AREA URNS HPF: ABNORMAL /HPF
BILIRUB UR QL STRIP: NEGATIVE
COLOR UR AUTO: YELLOW
GLUCOSE UR STRIP-MCNC: NEGATIVE MG/DL
HGB UR QL STRIP: ABNORMAL
KETONES UR STRIP-MCNC: NEGATIVE MG/DL
LEUKOCYTE ESTERASE UR QL STRIP: ABNORMAL
NITRATE UR QL: NEGATIVE
PH UR STRIP: 6 [PH] (ref 5–7)
RBC #/AREA URNS AUTO: ABNORMAL /HPF
SP GR UR STRIP: 1.01 (ref 1–1.03)
SQUAMOUS #/AREA URNS AUTO: ABNORMAL /LPF
UROBILINOGEN UR STRIP-ACNC: 0.2 E.U./DL
WBC #/AREA URNS AUTO: ABNORMAL /HPF

## 2023-09-14 PROCEDURE — 99213 OFFICE O/P EST LOW 20 MIN: CPT | Performed by: FAMILY MEDICINE

## 2023-09-14 PROCEDURE — 87088 URINE BACTERIA CULTURE: CPT | Performed by: FAMILY MEDICINE

## 2023-09-14 PROCEDURE — 87086 URINE CULTURE/COLONY COUNT: CPT | Performed by: FAMILY MEDICINE

## 2023-09-14 PROCEDURE — 87186 SC STD MICRODIL/AGAR DIL: CPT | Performed by: FAMILY MEDICINE

## 2023-09-14 PROCEDURE — 81001 URINALYSIS AUTO W/SCOPE: CPT | Performed by: FAMILY MEDICINE

## 2023-09-14 RX ORDER — PHENAZOPYRIDINE HYDROCHLORIDE 100 MG/1
100 TABLET, FILM COATED ORAL 3 TIMES DAILY PRN
Qty: 6 TABLET | Refills: 0 | Status: SHIPPED | OUTPATIENT
Start: 2023-09-14 | End: 2024-01-11

## 2023-09-14 RX ORDER — NITROFURANTOIN 25; 75 MG/1; MG/1
100 CAPSULE ORAL 2 TIMES DAILY
Qty: 10 CAPSULE | Refills: 0 | Status: SHIPPED | OUTPATIENT
Start: 2023-09-14 | End: 2023-09-19

## 2023-09-14 NOTE — RESULT ENCOUNTER NOTE
Patient was seen today in clinic.  I discussed results in clinic, please see clinic progress note.    Gabriela Daly MD 9/14/2023

## 2023-09-14 NOTE — PROGRESS NOTES
Assessment & Plan     (R30.0) Dysuria  (primary encounter diagnosis)  Due to  (N39.0) Acute UTI (urinary tract infection)  Comment: probably related to atrophic vaginitis, she is treated with topical vaginal estrogen, and hx of MS.  Treat as below, follow up if symptoms don't improve in next 24 - 48  hours    Plan: nitroFURantoin macrocrystal-monohydrate         (MACROBID) 100 MG capsule, phenazopyridine         (PYRIDIUM) 100 MG tablet             Gabriela Daly MD  [facility]    Alyssa Jhaveri is a 57 year old, presenting for the following health issues:  UTI      9/14/2023    10:37 AM   Additional Questions   Roomed by Sabino   Accompanied by Self       UTI    History of Present Illness       Reason for visit:  Bladder  Symptom onset:  1-3 days ago  Symptom intensity:  Moderate  Symptom progression:  Staying the same  Had these symptoms before:  Yes  Has tried/received treatment for these symptoms:  Yes  Previous treatment was successful:  Yes  Prior treatment description:  Antobiotic    She eats 2-3 servings of fruits and vegetables daily.She exercises with enough effort to increase her heart rate 20 to 29 minutes per day.  She exercises with enough effort to increase her heart rate 5 days per week. She is missing 7 dose(s) of medications per week.  She is not taking prescribed medications regularly due to other.     Results for orders placed or performed in visit on 09/14/23   UA Macroscopic with reflex to Microscopic and Culture - Lab Collect     Status: Abnormal    Specimen: Urine, Clean Catch   Result Value Ref Range    Color Urine Yellow Colorless, Straw, Light Yellow, Yellow    Appearance Urine Clear Clear    Glucose Urine Negative Negative mg/dL    Bilirubin Urine Negative Negative    Ketones Urine Negative Negative mg/dL    Specific Gravity Urine 1.010 1.003 - 1.035    Blood Urine Moderate (A) Negative    pH Urine 6.0 5.0 - 7.0    Protein Albumin Urine 30 (A)  "Negative mg/dL    Urobilinogen Urine 0.2 0.2, 1.0 E.U./dL    Nitrite Urine Negative Negative    Leukocyte Esterase Urine Moderate (A) Negative   Urine Microscopic Exam     Status: Abnormal   Result Value Ref Range    Bacteria Urine Moderate (A) None Seen /HPF    RBC Urine None Seen 0-2 /HPF /HPF    WBC Urine 25-50 (A) 0-5 /HPF /HPF    Squamous Epithelials Urine Moderate (A) None Seen /LPF      Review of Systems   Constitutional, HEENT, cardiovascular, pulmonary, gi systems are negative, except as otherwise noted.      Objective    BP (!) 147/87 (BP Location: Right arm, Patient Position: Sitting, Cuff Size: Adult Regular)   Pulse 96   Temp 97.5  F (36.4  C) (Temporal)   Resp 15   Ht 1.65 m (5' 4.96\")   Wt 83.9 kg (185 lb)   SpO2 97%   BMI 30.82 kg/m    Body mass index is 30.82 kg/m .  Physical Exam   GENERAL: healthy, alert and no distress  RESP: lungs clear to auscultation - no rales, rhonchi or wheezes  ABDOMEN: soft, nontender, no hepatosplenomegaly, no masses   MS: no gross musculoskeletal defects noted, no edema                  "

## 2023-09-15 LAB — BACTERIA UR CULT: ABNORMAL

## 2023-09-16 ENCOUNTER — MYC MEDICAL ADVICE (OUTPATIENT)
Dept: FAMILY MEDICINE | Facility: CLINIC | Age: 58
End: 2023-09-16
Payer: COMMERCIAL

## 2023-09-17 ENCOUNTER — OFFICE VISIT (OUTPATIENT)
Dept: FAMILY MEDICINE | Facility: CLINIC | Age: 58
End: 2023-09-17
Payer: COMMERCIAL

## 2023-09-17 ENCOUNTER — NURSE TRIAGE (OUTPATIENT)
Dept: NURSING | Facility: CLINIC | Age: 58
End: 2023-09-17
Payer: COMMERCIAL

## 2023-09-17 VITALS
SYSTOLIC BLOOD PRESSURE: 149 MMHG | TEMPERATURE: 99 F | RESPIRATION RATE: 18 BRPM | BODY MASS INDEX: 30.41 KG/M2 | DIASTOLIC BLOOD PRESSURE: 90 MMHG | WEIGHT: 182.5 LBS | HEART RATE: 102 BPM | OXYGEN SATURATION: 95 %

## 2023-09-17 DIAGNOSIS — N30.00 ACUTE CYSTITIS WITHOUT HEMATURIA: Primary | ICD-10-CM

## 2023-09-17 PROCEDURE — 99213 OFFICE O/P EST LOW 20 MIN: CPT | Performed by: PHYSICIAN ASSISTANT

## 2023-09-17 RX ORDER — SULFAMETHOXAZOLE/TRIMETHOPRIM 800-160 MG
1 TABLET ORAL 2 TIMES DAILY
Qty: 10 TABLET | Refills: 0 | Status: SHIPPED | OUTPATIENT
Start: 2023-09-17 | End: 2023-09-22

## 2023-09-17 RX ORDER — PHENAZOPYRIDINE HYDROCHLORIDE 100 MG/1
100 TABLET, FILM COATED ORAL 3 TIMES DAILY PRN
Qty: 9 TABLET | Refills: 0 | Status: SHIPPED | OUTPATIENT
Start: 2023-09-17 | End: 2024-01-11

## 2023-09-17 NOTE — PROGRESS NOTES
Patient presents with:  UTI: Pt was seen on 9/14 for UTI not improving urgent urination,burring,fever and bladder and back pain      Clinical Decision Making:  Due to intermediate resistance to Macrobid on latest UC and lack of symptom resolution I recommend switching to an antibiotic with better susceptibility. Patient started on Bactrim today.       ICD-10-CM    1. Acute cystitis without hematuria  N30.00 sulfamethoxazole-trimethoprim (BACTRIM DS) 800-160 MG tablet     phenazopyridine (PYRIDIUM) 100 MG tablet          Patient Instructions   1. Increase fluid intake  2. Complete antibiotic regimen as prescribed. You will be notified if the treatment plan needs to be changed based on the urine culture results.   3. Follow if you have a fever of 100.4 F (38 C) or higher, no improvement after three days of treatment, trouble urinating because of pain,new or increased discharge from the vagina, rash or joint pain, Increased back or abdominal pain.      HPI:  Shakila Kapoor is a 57 year old female who presents today complaining of uti symptoms not improving. Patient was dx with UTI on 9/14/23 and she was started on Macrobid. She does not feel any better. She reports low grade fever . Low back pain, but no flank pain. Her UC grew Klebsiella with intermediate resistance to Macrobid.     History obtained from the patient.    Problem List:  2023-09: Atrophic vaginitis  2023-02: Other cord compression (H)  2022-12: S/P cervical spinal fusion  2022-05: Mixed hyperlipidemia  2022-05: Benign essential hypertension  2021-08: Optic neuritis  2021-03: Primary meningioma of optic nerve sheath (H)  2021-01: Anxiety  2021-01: Multiple sclerosis (H)  2017-05: Cervical high risk HPV (human papillomavirus) test positive      Past Medical History:   Diagnosis Date    Benign essential hypertension 5/25/2022    Meningioma (H)     Mixed hyperlipidemia 5/26/2022    Multiple sclerosis (H)        Social History     Tobacco Use     Smoking status: Never    Smokeless tobacco: Never   Substance Use Topics    Alcohol use: Not Currently     Comment: rarely       Review of Systems    Vitals:    09/17/23 1608   BP: (!) 149/90   BP Location: Right arm   Patient Position: Sitting   Cuff Size: Adult Regular   Pulse: 102   Resp: 18   Temp: 99  F (37.2  C)   TempSrc: Oral   SpO2: 95%   Weight: 82.8 kg (182 lb 8 oz)       Physical Exam  Vitals and nursing note reviewed.   Constitutional:       General: She is not in acute distress.     Appearance: She is not ill-appearing.   HENT:      Head: Normocephalic and atraumatic.      Right Ear: External ear normal.      Left Ear: External ear normal.   Eyes:      Conjunctiva/sclera: Conjunctivae normal.   Neurological:      Mental Status: She is alert.   Psychiatric:         Mood and Affect: Mood normal.         Behavior: Behavior normal.         Thought Content: Thought content normal.         Judgment: Judgment normal.         At the end of the encounter, I discussed results, diagnosis, medications. Discussed red flags for immediate return to clinic/ER, as well as indications for follow up if no improvement. Patient understood and agreed to plan. Patient was stable for discharge.

## 2023-09-17 NOTE — TELEPHONE ENCOUNTER
[facility] [place] on Thursday for UTI, rx antibiotics, and pain meds. Symptoms not improved, sent to scheduling for virtual visit.     Reason for Disposition   [1] Taking antibiotic > 72 hours (3 days) for UTI AND [2] flank or lower back pain is SAME (unchanged, not better)    Additional Information   Negative: Shock suspected (e.g., cold/pale/clammy skin, too weak to stand, low BP, rapid pulse)   Negative: Sounds like a life-threatening emergency to the triager   Negative: [1] Unable to urinate (or only a few drops) > 4 hours AND [2] bladder feels very full (e.g., palpable bladder or strong urge to urinate)   Negative: Passing pure blood or large blood clots (i.e., size > a dime)  (Exceptions: Flecks, small strands, or pinkish-red color)   Negative: Fever > 103 F (39.4 C)   Negative: Patient sounds very sick or weak to the triager   Negative: [1] SEVERE pain (e.g., excruciating) AND [2] no improvement 2 hours after pain medications   Negative: [1] Fever > 100.0 F (37.8 C) AND [2] new-onset since starting antibiotics   Negative: [1] Side (flank) or lower back pain AND [2] new-onset since starting antibiotics   Negative: [1] Taking antibiotic > 24 hours for UTI AND [2] flank or lower back pain getting WORSE   Negative: [1] Vomiting 2 or more times AND [2] interferes with taking oral antibiotic   Negative: [1] Taking antibiotic > 24 hours for UTI (urinary tract or bladder infection) AND [2] fever persists (still has fever)   Negative: [1] Taking antibiotic > 72 hours (3 days) for UTI AND [2] painful urination or frequency is SAME (unchanged, not better)    Protocols used: Urinary Tract Infection on Antibiotic Follow-up Call - Female-A-

## 2023-09-19 NOTE — TELEPHONE ENCOUNTER
Please see recent Urine Culture results.  Looks as though patient has already had 2 antibiotics for this infection.  Lillian Murdock RN  [facility]   <<----- Click to add NO significant Past Surgical History

## 2023-09-19 NOTE — TELEPHONE ENCOUNTER
See Dr. Soto OV note 9/17/23, patient was prescribed new antibiotic, substituting Bactrim for nitrofurantoin, with better bacteria coverage.    Sincerely,  Dr. Gabriela Daly MD  9/19/2023

## 2023-09-21 NOTE — TELEPHONE ENCOUNTER
Medication Appeal Initiation    We have initiated an appeal for the requested medication:  Medication: ZEPOSIA 0.92 MG PO CAPS  Appeal Start Date:  9/21/2023  Insurance Company: [employer]  Insurance Phone: [phone]  Insurance Fax: [phone]  Comments:  Appeal letter faxed to [employer] for review. Fax # [phone]      Thank you,    Jasmyn Garcia [facility]  Specialty Pharmacy Clinic Liaison - [facility] [address] [address]  Ph: [phone] Fax: [phone]  [email or web address]

## 2023-09-22 ENCOUNTER — DOCUMENTATION ONLY (OUTPATIENT)
Dept: NEUROLOGY | Facility: CLINIC | Age: 58
End: 2023-09-22
Payer: COMMERCIAL

## 2023-09-22 NOTE — PROGRESS NOTES
Appeal notice received from [employer] for Zeposia, appeal currently under process notification will come via mail.  Ramez Acosta EMT 09/22/2023 9:28AM

## 2023-09-27 ENCOUNTER — OFFICE VISIT (OUTPATIENT)
Dept: FAMILY MEDICINE | Facility: CLINIC | Age: 58
End: 2023-09-27
Payer: COMMERCIAL

## 2023-09-27 VITALS
TEMPERATURE: 98.3 F | BODY MASS INDEX: 30.32 KG/M2 | RESPIRATION RATE: 20 BRPM | OXYGEN SATURATION: 97 % | HEART RATE: 87 BPM | SYSTOLIC BLOOD PRESSURE: 128 MMHG | WEIGHT: 182 LBS | DIASTOLIC BLOOD PRESSURE: 80 MMHG

## 2023-09-27 DIAGNOSIS — N30.00 ACUTE CYSTITIS WITHOUT HEMATURIA: Primary | ICD-10-CM

## 2023-09-27 DIAGNOSIS — R39.15 URGENCY OF URINATION: ICD-10-CM

## 2023-09-27 LAB
ALBUMIN UR-MCNC: 100 MG/DL
APPEARANCE UR: CLEAR
BACTERIA #/AREA URNS HPF: ABNORMAL /HPF
BILIRUB UR QL STRIP: NEGATIVE
COLOR UR AUTO: ABNORMAL
GLUCOSE UR STRIP-MCNC: NEGATIVE MG/DL
HGB UR QL STRIP: ABNORMAL
KETONES UR STRIP-MCNC: NEGATIVE MG/DL
LEUKOCYTE ESTERASE UR QL STRIP: ABNORMAL
NITRATE UR QL: NEGATIVE
PH UR STRIP: 6 [PH] (ref 5–8)
RBC #/AREA URNS AUTO: ABNORMAL /HPF
SP GR UR STRIP: 1.01 (ref 1–1.03)
SQUAMOUS #/AREA URNS AUTO: ABNORMAL /LPF
UROBILINOGEN UR STRIP-ACNC: 0.2 E.U./DL
WBC #/AREA URNS AUTO: >100 /HPF

## 2023-09-27 PROCEDURE — 81001 URINALYSIS AUTO W/SCOPE: CPT

## 2023-09-27 PROCEDURE — 87086 URINE CULTURE/COLONY COUNT: CPT | Performed by: FAMILY MEDICINE

## 2023-09-27 PROCEDURE — 99214 OFFICE O/P EST MOD 30 MIN: CPT | Performed by: FAMILY MEDICINE

## 2023-09-27 PROCEDURE — 87186 SC STD MICRODIL/AGAR DIL: CPT | Performed by: FAMILY MEDICINE

## 2023-09-27 PROCEDURE — 87088 URINE BACTERIA CULTURE: CPT | Performed by: FAMILY MEDICINE

## 2023-09-27 RX ORDER — CEFDINIR 300 MG/1
300 CAPSULE ORAL 2 TIMES DAILY
Qty: 14 CAPSULE | Refills: 0 | Status: SHIPPED | OUTPATIENT
Start: 2023-09-27 | End: 2023-10-04

## 2023-09-27 RX ORDER — NITROFURANTOIN 25; 75 MG/1; MG/1
100 CAPSULE ORAL 2 TIMES DAILY
Qty: 10 CAPSULE | Refills: 0 | Status: CANCELLED | OUTPATIENT
Start: 2023-09-27 | End: 2023-10-02

## 2023-09-27 NOTE — PROGRESS NOTES
Assessment:       Acute cystitis without hematuria  - cefdinir (OMNICEF) 300 MG capsule  Dispense: 14 capsule; Refill: 0    Urgency of urination  - UA Macroscopic with reflex to Microscopic and Culture - Clinic Collect  - Urine Microscopic Exam  - Urine Culture         Plan:     Symptoms consistent with a urinary tract infection.  Antibiotics prescribed as noted above.  Urine culture is pending and will adjust antibiotic based on the culture and sensitivities as needed.  Recommend pushing fluids and follow-up if symptoms are getting worse or not improving over the next 2 to 3 days.  If keep having recurring UTIs may need to see urology for further evaluation.      MEDICATIONS:   Orders Placed This Encounter   Medications    cefdinir (OMNICEF) 300 MG capsule     Sig: Take 1 capsule (300 mg) by mouth 2 times daily for 7 days     Dispense:  14 capsule     Refill:  0   31 minutes spent in chart review, history, physical exam, reviewing labs with patient, prescribing medication, discussing plan of care, documentation.  Subjective:       58 year old female multiple sclerosis presents for evaluation of burning with urination and increased urinary frequency and urgency for the past day.  She was seen on 9/14/2023 and treated for a UTI with Macrobid.  Her symptoms did not improve and her urine culture positive for Klebsiella showed intermediate sensitivity to the Macrobid.  She was seen again on 9/17/2023 and switched to Bactrim.  Her symptoms resolved completely.  She is now having symptoms back again.  She denies fevers, chills, nausea, vomiting, blood in her urine, or abnormal vaginal discharge.    Patient Active Problem List   Diagnosis    Anxiety    Cervical high risk HPV (human papillomavirus) test positive    Multiple sclerosis (H)    Primary meningioma of optic nerve sheath (H)    Optic neuritis    Benign essential hypertension    Mixed hyperlipidemia    S/P cervical spinal fusion    Other cord compression (H)     Atrophic vaginitis       Past Medical History:   Diagnosis Date    Benign essential hypertension 5/25/2022    Meningioma (H)     Mixed hyperlipidemia 5/26/2022    Multiple sclerosis (H)        Past Surgical History:   Procedure Laterality Date    DISCECTOMY, FUSION CERVICAL ANTERIOR TWO LEVELS, COMBINED N/A 12/20/2022    Procedure: Anterior Cervical 4-6 Fusion with interbody cages and cervical plate;  Surgeon: Be Solorzano MD;  Location:  OR       Current Outpatient Medications   Medication    cholecalciferol (VITAMIN D3) 125 mcg (5000 units) capsule    estradiol (VAGIFEM) 10 MCG TABS vaginal tablet    lisinopril (ZESTRIL) 10 MG tablet    LORazepam (ATIVAN) 0.5 MG tablet    oxybutynin ER (DITROPAN XL) 10 MG 24 hr tablet    PARoxetine (PAXIL) 30 MG tablet    phenazopyridine (PYRIDIUM) 100 MG tablet    phenazopyridine (PYRIDIUM) 100 MG tablet    ZEPOSIA 0.92 MG CAPS     No current facility-administered medications for this visit.       Allergies   Allergen Reactions    Penicillins      Other reaction(s): *Unknown       Family History   Problem Relation Age of Onset    Bipolar Disorder Mother     Hypertension Father     Lung Cancer Maternal Grandmother     Lung Cancer Maternal Grandfather     Lung Cancer Paternal Grandmother     Hirschsprung's Disease Son     Anxiety Disorder Daughter     Glaucoma No family hx of     Macular Degeneration No family hx of        Social History     Socioeconomic History    Marital status:    Tobacco Use    Smoking status: Never    Smokeless tobacco: Never   Vaping Use    Vaping Use: Never used   Substance and Sexual Activity    Alcohol use: Not Currently     Comment: rarely    Drug use: Never    Sexual activity: Yes     Partners: Male     Birth control/protection: None         Review of Systems  Pertinent items are noted in HPI.      Objective:     /80 (BP Location: Right arm, Patient Position: Sitting, Cuff Size: Adult Regular)   Pulse 87   Temp 98.3  F (36.8  C) (Oral)    Resp 20   Wt 82.6 kg (182 lb)   SpO2 97%   BMI 30.32 kg/m       General appearance: alert, appears stated age, and cooperative  Back: No CVA tenderness  Abdomen: soft, non-tender; bowel sounds normal; no masses,  no organomegaly  Extremities: Warm and well perfused      Results for orders placed or performed in visit on 09/27/23   UA Macroscopic with reflex to Microscopic and Culture - Clinic Collect     Status: Abnormal    Specimen: Urine, Clean Catch   Result Value Ref Range    Color Urine Light Yellow Colorless, Straw, Light Yellow, Yellow    Appearance Urine Clear Clear    Glucose Urine Negative Negative mg/dL    Bilirubin Urine Negative Negative    Ketones Urine Negative Negative mg/dL    Specific Gravity Urine 1.010 1.005 - 1.030    Blood Urine Moderate (A) Negative    pH Urine 6.0 5.0 - 8.0    Protein Albumin Urine 100 (A) Negative mg/dL    Urobilinogen Urine 0.2 0.2, 1.0 E.U./dL    Nitrite Urine Negative Negative    Leukocyte Esterase Urine Moderate (A) Negative   Urine Microscopic Exam     Status: Abnormal   Result Value Ref Range    Bacteria Urine Few (A) None Seen /HPF    RBC Urine 5-10 (A) 0-2 /HPF /HPF    WBC Urine >100 (A) 0-5 /HPF /HPF    Squamous Epithelials Urine Few (A) None Seen /LPF       This note has been dictated using voice recognition software. Any grammatical or context distortions are unintentional and inherent to the software

## 2023-09-28 LAB — BACTERIA SPEC CULT: ABNORMAL

## 2023-10-08 ENCOUNTER — OFFICE VISIT (OUTPATIENT)
Dept: FAMILY MEDICINE | Facility: CLINIC | Age: 58
End: 2023-10-08
Payer: COMMERCIAL

## 2023-10-08 VITALS
RESPIRATION RATE: 18 BRPM | DIASTOLIC BLOOD PRESSURE: 74 MMHG | BODY MASS INDEX: 30.82 KG/M2 | OXYGEN SATURATION: 98 % | HEART RATE: 94 BPM | TEMPERATURE: 99.1 F | WEIGHT: 185 LBS | SYSTOLIC BLOOD PRESSURE: 114 MMHG

## 2023-10-08 DIAGNOSIS — N39.0 RECURRENT UTI: Primary | ICD-10-CM

## 2023-10-08 DIAGNOSIS — R30.0 DYSURIA: ICD-10-CM

## 2023-10-08 LAB
ALBUMIN UR-MCNC: 100 MG/DL
APPEARANCE UR: CLEAR
BACTERIA #/AREA URNS HPF: ABNORMAL /HPF
BILIRUB UR QL STRIP: NEGATIVE
COLOR UR AUTO: YELLOW
GLUCOSE UR STRIP-MCNC: NEGATIVE MG/DL
HGB UR QL STRIP: ABNORMAL
KETONES UR STRIP-MCNC: NEGATIVE MG/DL
LEUKOCYTE ESTERASE UR QL STRIP: ABNORMAL
NITRATE UR QL: NEGATIVE
PH UR STRIP: 6 [PH] (ref 5–8)
RBC #/AREA URNS AUTO: ABNORMAL /HPF
SP GR UR STRIP: <=1.005 (ref 1–1.03)
SQUAMOUS #/AREA URNS AUTO: ABNORMAL /LPF
UROBILINOGEN UR STRIP-ACNC: 0.2 E.U./DL
WBC #/AREA URNS AUTO: ABNORMAL /HPF

## 2023-10-08 PROCEDURE — 87186 SC STD MICRODIL/AGAR DIL: CPT | Performed by: PHYSICIAN ASSISTANT

## 2023-10-08 PROCEDURE — 87088 URINE BACTERIA CULTURE: CPT | Performed by: PHYSICIAN ASSISTANT

## 2023-10-08 PROCEDURE — 81001 URINALYSIS AUTO W/SCOPE: CPT | Performed by: PHYSICIAN ASSISTANT

## 2023-10-08 PROCEDURE — 99214 OFFICE O/P EST MOD 30 MIN: CPT | Performed by: PHYSICIAN ASSISTANT

## 2023-10-08 PROCEDURE — 87086 URINE CULTURE/COLONY COUNT: CPT | Performed by: PHYSICIAN ASSISTANT

## 2023-10-08 RX ORDER — CIPROFLOXACIN 500 MG/1
500 TABLET, FILM COATED ORAL 2 TIMES DAILY
Qty: 14 TABLET | Refills: 0 | Status: SHIPPED | OUTPATIENT
Start: 2023-10-08 | End: 2023-10-15

## 2023-10-08 ASSESSMENT — ENCOUNTER SYMPTOMS
FEVER: 0
ABDOMINAL PAIN: 0
HEMATURIA: 0
DYSURIA: 1
CHILLS: 1
NAUSEA: 0
FREQUENCY: 1
FLANK PAIN: 0
VOMITING: 0

## 2023-10-08 NOTE — PROGRESS NOTES
Patient presents with:  Urinary Problem: Bladder pain, urgency, pain with urination. Has been seen 09/14, 09/17, and 09/27 for same symptoms. Symptoms have not improved      Clinical Decision Making:  Patient experiencing recurrent UTI for the fourth time.  She was started on ciprofloxacin for treatment today.  Patient was referred to urology.  At this time she is not systemically ill and she is vitally stable.    ICD-10-CM    1. Recurrent UTI  N39.0 Adult Urology  Referral     ciprofloxacin (CIPRO) 500 MG tablet      2. Dysuria  R30.0 UA Macroscopic with reflex to Microscopic and Culture - Clinic Collect     Urine Microscopic Exam     Urine Culture          Patient Instructions   1. Increase fluid intake  2. Complete antibiotic regimen as prescribed. You will be notified if the treatment plan needs to be changed based on the urine culture results.   3. Follow if you have a fever of 100.4 F (38 C) or higher, no improvement after three days of treatment, trouble urinating because of pain,new or increased discharge from the vagina, rash or joint pain, Increased back or abdominal pain.  4. Plan to follow up with Urology even if you are symptom. Free you should get a call from our scheduling team. If [employer] is scheduling far out I would check with [facility] regarding their availability.     HPI:  Shakila Kapoor is a 58 year old female who presents today complaining of UTI recurrence. Patient was previously seen for UTI on 9/14/23. UC grew Klebsiella and she was treated with Nitrofurantoin bid x 5 days. She was seen on 9/17/23 due to no improvement. UC again grew out Klebsiella. She was started on Bactrim bid x 5 days. She was seen again on 9/27/23 and UC grew Klebsiella and she was treated with Cefdinir bid x 7 days. Today she states that symptoms have never improved and she is having bladder pain and urgency.     History obtained from the patient.    Problem List:  2023-09: Atrophic  vaginitis  2023-02: Other cord compression (H)  2022-12: S/P cervical spinal fusion  2022-05: Mixed hyperlipidemia  2022-05: Benign essential hypertension  2021-08: Optic neuritis  2021-03: Primary meningioma of optic nerve sheath (H)  2021-01: Anxiety  2021-01: Multiple sclerosis (H)  2017-05: Cervical high risk HPV (human papillomavirus) test positive      Past Medical History:   Diagnosis Date    Benign essential hypertension 5/25/2022    Meningioma (H)     Mixed hyperlipidemia 5/26/2022    Multiple sclerosis (H)        Social History     Tobacco Use    Smoking status: Never    Smokeless tobacco: Never   Substance Use Topics    Alcohol use: Not Currently     Comment: rarely       Review of Systems   Constitutional:  Positive for chills. Negative for fever (mildly elevated body temps).   Gastrointestinal:  Negative for abdominal pain, nausea and vomiting.   Genitourinary:  Positive for dysuria, frequency and urgency. Negative for flank pain, hematuria, vaginal discharge and vaginal pain.       Vitals:    10/08/23 1049   BP: 114/74   Pulse: 94   Resp: 18   Temp: 99.1  F (37.3  C)   TempSrc: Oral   SpO2: 98%   Weight: 83.9 kg (185 lb)       Physical Exam  Vitals and nursing note reviewed.   Constitutional:       General: She is not in acute distress.     Appearance: She is not ill-appearing.   HENT:      Head: Normocephalic and atraumatic.      Right Ear: External ear normal.      Left Ear: External ear normal.   Eyes:      Conjunctiva/sclera: Conjunctivae normal.   Neurological:      Mental Status: She is alert.   Psychiatric:         Mood and Affect: Mood normal.         Behavior: Behavior normal.         Thought Content: Thought content normal.         Judgment: Judgment normal.         Results:  Results for orders placed or performed in visit on 10/08/23   UA Macroscopic with reflex to Microscopic and Culture - Clinic Collect     Status: Abnormal    Specimen: Urine, Clean Catch   Result Value Ref Range    Color  Urine Yellow Colorless, Straw, Light Yellow, Yellow    Appearance Urine Clear Clear    Glucose Urine Negative Negative mg/dL    Bilirubin Urine Negative Negative    Ketones Urine Negative Negative mg/dL    Specific Gravity Urine <=1.005 1.005 - 1.030    Blood Urine Moderate (A) Negative    pH Urine 6.0 5.0 - 8.0    Protein Albumin Urine 100 (A) Negative mg/dL    Urobilinogen Urine 0.2 0.2, 1.0 E.U./dL    Nitrite Urine Negative Negative    Leukocyte Esterase Urine Moderate (A) Negative   Urine Microscopic Exam     Status: Abnormal   Result Value Ref Range    Bacteria Urine Moderate (A) None Seen /HPF    RBC Urine 5-10 (A) 0-2 /HPF /HPF    WBC Urine  (A) 0-5 /HPF /HPF    Squamous Epithelials Urine Moderate (A) None Seen /LPF         At the end of the encounter, I discussed results, diagnosis, medications. Discussed red flags for immediate return to clinic/ER, as well as indications for follow up if no improvement. Patient understood and agreed to plan. Patient was stable for discharge.

## 2023-10-08 NOTE — PATIENT INSTRUCTIONS
1. Increase fluid intake  2. Complete antibiotic regimen as prescribed. You will be notified if the treatment plan needs to be changed based on the urine culture results.   3. Follow if you have a fever of 100.4 F (38 C) or higher, no improvement after three days of treatment, trouble urinating because of pain,new or increased discharge from the vagina, rash or joint pain, Increased back or abdominal pain.  4. Plan to follow up with Urology even if you are symptom. Free you should get a call from our scheduling team. If [facility] is scheduling far out I would check with [facility] regarding their availability.

## 2023-10-09 LAB — BACTERIA UR CULT: ABNORMAL

## 2023-10-09 NOTE — TELEPHONE ENCOUNTER
MEDICATION APPEAL DENIED    Medication: ZEPOSIA 0.92 MG PO CAPS  Insurance Company: [employer]  Denial Date: 9/25/2023  Denial Rational: Must try/fail one preferred again: Jesus Alberto or Maile  Second Level Appeal Information: External Review  Patient Notified: Yes  Central Prior Authorization Team ONLY: Second level appeals will be managed by the clinic staff and provider. Please contact the [employer] Prior Authorization Team if additional information about the denial is needed.

## 2023-10-16 ENCOUNTER — TELEPHONE (OUTPATIENT)
Dept: NEUROSURGERY | Facility: CLINIC | Age: 58
End: 2023-10-16
Payer: COMMERCIAL

## 2023-10-18 ENCOUNTER — TRANSFERRED RECORDS (OUTPATIENT)
Dept: HEALTH INFORMATION MANAGEMENT | Facility: CLINIC | Age: 58
End: 2023-10-18
Payer: COMMERCIAL

## 2023-10-18 NOTE — TELEPHONE ENCOUNTER
FREE DRUG APPLICATION INITIATED    Medication: ZEPOSIA 0.92 MG PO CAPS  Free Drug Program Name:     Date Submitted: 10/18/2023   Phone #: [phone]  Fax #: [phone]  Additional Information: Faxed completed forms to [facility]    Thank you,    Jasmyn Garcia h-T  Specialty Pharmacy Clinic Liaison - CardiologyNeurologyMultiple Sclerosis  [facility] [address]  [address] [place] [address]  Ph: [phone] Fax: [phone]  [email or web address]

## 2023-10-25 PROBLEM — N20.0 KIDNEY STONE: Status: ACTIVE | Noted: 2023-10-25

## 2023-11-07 NOTE — TELEPHONE ENCOUNTER
Writer called [employer]'s PAP program and spoke to Scarlet, she confirmed all documents have been received and she was not sure why they haven't processed the application yet.  She is going to escalate to management for an expedited review, we should have the decision within 24-48 hours.

## 2023-11-13 NOTE — TELEPHONE ENCOUNTER
Spoke with [employer] again requesting an update. The representative stated they needed a copy of her insurance card reflecting [employer], not [employer]. I explained that it's the same plan and it's the only card available and it's the most recent card. The processing information is the same. This card was provided to them upon initiating PAP. The representative stated she'd pass along the information.     Thank you,    Jasmyn Garcia [facility]  Specialty Pharmacy Clinic Liaison - CardiologyNeurologyMultiple [facility] [address] [address]  Ph: [phone] Fax: [phone]  [email or web address]

## 2023-11-16 ENCOUNTER — LAB (OUTPATIENT)
Dept: LAB | Facility: CLINIC | Age: 58
End: 2023-11-16
Payer: COMMERCIAL

## 2023-11-16 ENCOUNTER — OFFICE VISIT (OUTPATIENT)
Dept: NEUROLOGY | Facility: CLINIC | Age: 58
End: 2023-11-16
Attending: PSYCHIATRY & NEUROLOGY
Payer: COMMERCIAL

## 2023-11-16 VITALS
SYSTOLIC BLOOD PRESSURE: 127 MMHG | BODY MASS INDEX: 31.19 KG/M2 | DIASTOLIC BLOOD PRESSURE: 81 MMHG | OXYGEN SATURATION: 98 % | WEIGHT: 187.2 LBS | HEART RATE: 85 BPM

## 2023-11-16 DIAGNOSIS — E55.9 VITAMIN D DEFICIENCY: ICD-10-CM

## 2023-11-16 DIAGNOSIS — N31.9 NEUROGENIC BLADDER: ICD-10-CM

## 2023-11-16 DIAGNOSIS — D32.0 MENINGIOMA OF OPTIC NERVE SHEATH (H): ICD-10-CM

## 2023-11-16 DIAGNOSIS — G35 MS (MULTIPLE SCLEROSIS) (H): ICD-10-CM

## 2023-11-16 DIAGNOSIS — R26.9 GAIT DISTURBANCE: ICD-10-CM

## 2023-11-16 DIAGNOSIS — G35 MS (MULTIPLE SCLEROSIS) (H): Primary | ICD-10-CM

## 2023-11-16 LAB
ALBUMIN SERPL BCG-MCNC: 4.2 G/DL (ref 3.5–5.2)
ALP SERPL-CCNC: 71 U/L (ref 40–150)
ALT SERPL W P-5'-P-CCNC: 22 U/L (ref 0–50)
AST SERPL W P-5'-P-CCNC: 22 U/L (ref 0–45)
BASOPHILS # BLD AUTO: 0 10E3/UL (ref 0–0.2)
BASOPHILS NFR BLD AUTO: 0 %
BILIRUB DIRECT SERPL-MCNC: <0.2 MG/DL (ref 0–0.3)
BILIRUB SERPL-MCNC: 0.3 MG/DL
EOSINOPHIL # BLD AUTO: 0.1 10E3/UL (ref 0–0.7)
EOSINOPHIL NFR BLD AUTO: 1 %
ERYTHROCYTE [DISTWIDTH] IN BLOOD BY AUTOMATED COUNT: 12.9 % (ref 10–15)
HCT VFR BLD AUTO: 43.7 % (ref 35–47)
HGB BLD-MCNC: 14.8 G/DL (ref 11.7–15.7)
IMM GRANULOCYTES # BLD: 0 10E3/UL
IMM GRANULOCYTES NFR BLD: 0 %
LYMPHOCYTES # BLD AUTO: 0.6 10E3/UL (ref 0.8–5.3)
LYMPHOCYTES NFR BLD AUTO: 9 %
MCH RBC QN AUTO: 31 PG (ref 26.5–33)
MCHC RBC AUTO-ENTMCNC: 33.9 G/DL (ref 31.5–36.5)
MCV RBC AUTO: 91 FL (ref 78–100)
MONOCYTES # BLD AUTO: 0.7 10E3/UL (ref 0–1.3)
MONOCYTES NFR BLD AUTO: 10 %
NEUTROPHILS # BLD AUTO: 5 10E3/UL (ref 1.6–8.3)
NEUTROPHILS NFR BLD AUTO: 80 %
NRBC # BLD AUTO: 0 10E3/UL
NRBC BLD AUTO-RTO: 0 /100
PLATELET # BLD AUTO: 274 10E3/UL (ref 150–450)
PROT SERPL-MCNC: 6.9 G/DL (ref 6.4–8.3)
RBC # BLD AUTO: 4.78 10E6/UL (ref 3.8–5.2)
WBC # BLD AUTO: 6.3 10E3/UL (ref 4–11)

## 2023-11-16 PROCEDURE — 80076 HEPATIC FUNCTION PANEL: CPT | Performed by: PATHOLOGY

## 2023-11-16 PROCEDURE — 99213 OFFICE O/P EST LOW 20 MIN: CPT | Mod: GC | Performed by: PSYCHIATRY & NEUROLOGY

## 2023-11-16 PROCEDURE — 99214 OFFICE O/P EST MOD 30 MIN: CPT | Performed by: PSYCHIATRY & NEUROLOGY

## 2023-11-16 PROCEDURE — 36415 COLL VENOUS BLD VENIPUNCTURE: CPT | Performed by: PATHOLOGY

## 2023-11-16 PROCEDURE — 85025 COMPLETE CBC W/AUTO DIFF WBC: CPT | Performed by: PATHOLOGY

## 2023-11-16 ASSESSMENT — PAIN SCALES - GENERAL: PAINLEVEL: NO PAIN (0)

## 2023-11-16 NOTE — TELEPHONE ENCOUNTER
"Received denial letters from [facility] stating that patient is denied for the following reasons: They are not being treated in an Out-Patient setting, they are above the income level, and that their insurance is requiring them to use a Free Drug program, which is not allowed.      Called PAP at [phone]. Spoke with Rep Yenifer. Confirmed patient is being treated as an Out-Patient and that it is not part of an \"AFP\" where the insurance makes patients use free drug. Rep requested appeal denial showing it is not an AFP be faxed. This was faxed to [phone].    Rep then stated patient is above income by about $2,000. She stated that her tax return could be used to see if income was actually lower or that the patient could write a hardship letter stating any changes to income or other large expenses. Sending MyChart to patient to address.      "

## 2023-11-16 NOTE — LETTER
"11/16/2023      RE: Shakila Kapoor  [address] [place] [address]     Neurology Clinic Follow Up Visit    Reason: Multiple Sclerosis     11/16/2023   Source of information: Patient and chart review    History of Present Symptom:  Shakila Kapoor is a 58 year old female who presents today for follow up for multiple sclerosis, as planned.     The patient was last seen as of:  5/2023    Disease onset: At age 20 left side sensory symptoms and probable optic neuritis. She was initially thought to have probable/possible MS. She did not have any neurologic changes until 2019 when she developed progressive vision loss in the right eye. 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 She then did develop demyelinating disease on MRI and was started on Rebif. She has significant vision loss in right eye.      Previous disease modifying therapy: Rebif (ragiologic disease progression), then 2021 ozanimod (currently on this but is worried about insurance coverage and has only 1 month left)     She did have a C4-6 ACDF in the last year, that went well.     Today in meeting Shakila, she has been doing well. She does tell me she has a UTI in September for 1 month, and on a 3 month course of antibiotics and has an appointment to learn to self cath, and is interested in bladder stim (Medtronic's interstim device) that is MRI compatible. She states she has had no any new visual, sensory or motor symptoms suggestive of recurrent inflammation in the optic nerves, brainstem or spinal cord. She has no useful vision in the right eye. No concerns with ozanimod and is taking consistently but is concerned about insurance coverage and is working on the patient assistance with \"free drug\".    Symptom management:  Fatigue:  Low energy and tiredness continues to be a problem, stable, definitely worsened " "with UTI.   Bowel/bladder changes: Some urgency that is unchanged, she does have incont (worse during UTI last month) but stable, occurring daily with small leakage due to urgency. No longer taking Oxybutynin since UTI.   Gait/balance: She has fallen into the wall a few times lately. No other gait changes (\"wonky gait for always\")    The patient's medical, surgical, social, and family history were personally reviewed with the patient.  Past Medical History:   Diagnosis Date    Benign essential hypertension 05/25/2022    Kidney stone 10/25/2023    Meningioma (H)     Mixed hyperlipidemia 05/26/2022    Multiple sclerosis (H)       Past Surgical History:   Procedure Laterality Date    DISCECTOMY, FUSION CERVICAL ANTERIOR TWO LEVELS, COMBINED N/A 12/20/2022    Procedure: Anterior Cervical 4-6 Fusion with interbody cages and cervical plate;  Surgeon: Be Solorzano MD;  Location:  OR     Social History     Tobacco Use    Smoking status: Never    Smokeless tobacco: Never   Vaping Use    Vaping Use: Never used   Substance Use Topics    Alcohol use: Not Currently     Comment: rarely    Drug use: Never     Family History   Problem Relation Age of Onset    Bipolar Disorder Mother     Hypertension Father     Lung Cancer Maternal Grandmother     Lung Cancer Maternal Grandfather     Lung Cancer Paternal Grandmother     Hirschsprung's Disease Son     Anxiety Disorder Daughter     Glaucoma No family hx of     Macular Degeneration No family hx of      Current Outpatient Medications   Medication    cholecalciferol (VITAMIN D3) 125 mcg (5000 units) capsule    estradiol (VAGIFEM) 10 MCG TABS vaginal tablet    lisinopril (ZESTRIL) 10 MG tablet    LORazepam (ATIVAN) 0.5 MG tablet    PARoxetine (PAXIL) 30 MG tablet    ZEPOSIA 0.92 MG CAPS    oxybutynin ER (DITROPAN XL) 10 MG 24 hr tablet    phenazopyridine (PYRIDIUM) 100 MG tablet    phenazopyridine (PYRIDIUM) 100 MG tablet     No current facility-administered medications for this visit. "     Allergies   Allergen Reactions    Penicillins      Other reaction(s): *Unknown       Physical Examination   Vitals: /81 (BP Location: Right arm, Patient Position: Sitting, Cuff Size: Adult Regular)   Pulse 85   Wt 84.9 kg (187 lb 3.2 oz)   SpO2 98%   BMI 31.19 kg/m     General: Patient appears comfortable in no acute distress.   HEENT: NC/AT, no icterus, moist mucous membranes  Chest: non-labored on RA  Extremities: Warm, no edema  Skin: No rash or lesion   Psych: Affect appropriate for situation   Neuro:  Mental status: Awake, alert, attentive. Language is fluent with intact comprehension.   Cranial nerves: pupils equal, conjugate gaze, EOMI,   Fundoscopic palor on the right  R APD?  face symmetric, shoulder shrug strong, tongue protrusion midline, no dysarthria.   Motor: Normal muscle bulk and tone. No abnormal movements.       R L  Deltoid  4 5  Biceps  5 5  Triceps  5 5  Wrist Flexion  5 5  Wrist Ext 5 5  Finger Ext  5 5      Hip flexion 4+        4+  Knee flexion 5 5  Knee ext 5 5  Dorsiflexion 5 5    Reflexes: brisk reflexes symmetric in biceps, brachioradialis, patellae, and achilles. 3 beats on right and 2 on left ankle of clonus,   Sensory: Intact to light touch. Romberg is with a very subtle sway.   Coordination: FNF without ataxia or dysmetria.    Gait: Normal width, external rotation of both legs, reduced stride length, turn, with symmetric arm swing. Tandem walk intact fwd with legs ext rotated. Able to rise from chair with arms crossed. Not able to rise from chair with each leg individually. Able to balance on each leg for > 2 seconds individually better on the right    Assessment/Plan:  Shakila Kapoor is a 58 year old female who presents for follow up for multiple sclerosis with symptoms of right optic neuritis and impaired vision on right eye, on Zeposia tolerating well but concerned about insurance coverage. She also has been having more gait instability and falling into the walls at  "times. On examination today it is significant for brisk reflexes b/l with clonus at both ankles worse on the right than left, impaired vision on the right eye even to just light, with right APD and disc pallor, difficulty with tandem gait and inability to stand with each leg.     # multiple sclerosis   # urinary urgency   # gait instability   # nerve sheath meningioma      -continue Zeposia  (working on coverage, needs to start external appeal review and dispute externally and we will continue to work on the \"free drug\" program)  -We filled out paperwork for disability parking permit  -continue  working with urology for bladder stimulator and educated to avoid triggers like caffeine  -counseled on daily exercise recommendations  -CBC, LFTs labs today    -Increased Vit D to 5000 units every other day with 10,000 every other day(goal range of 60-90 mcg/L)  -return 6 months        Patient seen and discussed with Dr. Harris.  I have reviewed the plan with the patient, who is in agreement.    Kizzy Epstein, DO  Neurology Resident, PGY-4    Attending physician: I saw and evaluated the patient with Dr. Epstein, and I agree with her findings and plan of care as documented above, with the following additions.    The patient is clinically stable with no evidence of active inflammatory demyelination on disease modifying therapy with ozanimod. Today, we will check routine laboratory studies for monitoring of this medication to include complete blood counts with differential and hepatic panel.    Imaging of the brain is being performed through radiation oncology for monitoring of previously treated meningioma, and is scheduled next May. I will see her back in about 6 months, subsequent to the next scan.    Unfortunately, her insurance company is demanding that she switch to a different S1PR blocker. In my opinion, this is medically inappropriate and I told her that an independent external review should be requested. We " will try to work with the drug company to make sure that she is able to remain on medication.    Remainder as above.    Daniel Harris MD   of Neurology  [facility]        Diagnoses:    1) Multiple sclerosis  2) Optic nerve sheath meningioma  3) Gait disturbance  4) Neurogenic bladder  5) Vitamin D deficiency    Cc:  Margarita Suazo DNP (PCP)  Leslie Browne PA-C ([facility])  Rosie Carney MD, PhD (Radiation Oncology)  Patient          Daniel Harris MD

## 2023-11-16 NOTE — PATIENT INSTRUCTIONS
Continue Zeposia    2.   Blood tests today    3.   Recommend increasing vitamin D to 5000 units alternating with 10,000 units every other day    4.   Return to clinic in 6 months

## 2023-11-16 NOTE — Clinical Note
11/16/2023       RE: Shakila Kapoor  [address] [place] [address]     Dear Colleague,    Thank you for referring your patient, Shakila Kapoor, to the [facility] [place] [facility]. Please see a copy of my visit note below.    Neurology Clinic Follow Up Visit    Reason: Multiple Sclerosis       11/16/2023   Source of information: Patient and chart review    History of Present Symptom:  Shakila Kapoor is a 58 year old female who presents today for follow up for multiple sclerosis, as planned.     The patient was last seen as of:  5/2023    Disease onset: At age 20 left side sensory symptoms and probable optic neuritis. She was initially thought to have probable/possible MS. She did not have any neurologic changes until 2019 when she developed progressive vision loss in the right eye. 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 She then did develop demyelinating disease on MRI and was started on Rebif. She has significant vision loss in right eye.      Previous disease modifying therapy: Rebif (ragiologic disease progression), then 2021 ozanimod (currently on this but is worried about insurance coverage and has only 1 month left)     She did have a C4-6 ACDF in the last year, that went well.     Today in meeting Shakila, she has been doing well. She does tell me she has a UTI in September for 1 month, and on a 3 month course of antibiotics and has an appointment to learn to self cath, and is interested in bladder stim (Medtronic's interstim device) that is MRI compatible. She states she has had no any new visual, sensory or motor symptoms suggestive of recurrent inflammation in the optic nerves, brainstem or spinal cord. She has no useful vision in the right eye. No concerns with  "ozanimod and is taking consistently but is concerned about insurance coverage and is working on the patient assistance with \"free drug\".    Symptom management:  Fatigue:  Low energy and tiredness continues to be a problem, stable, definitely worsened with UTI.   Bowel/bladder changes: Some urgency that is unchanged, she does have incont (worse during UTI last month) but stable, occurring daily with small leakage due to urgency. No longer taking Oxybutynin since UTI.   Gait/balance: She has fallen into the wall a few times lately. No other gait changes (\"wonky gait for always\")    The patient's medical, surgical, social, and family history were personally reviewed with the patient.  Past Medical History:   Diagnosis Date    Benign essential hypertension 05/25/2022    Kidney stone 10/25/2023    Meningioma (H)     Mixed hyperlipidemia 05/26/2022    Multiple sclerosis (H)       Past Surgical History:   Procedure Laterality Date    DISCECTOMY, FUSION CERVICAL ANTERIOR TWO LEVELS, COMBINED N/A 12/20/2022    Procedure: Anterior Cervical 4-6 Fusion with interbody cages and cervical plate;  Surgeon: Be Solorzano MD;  Location:  OR     Social History     Tobacco Use    Smoking status: Never    Smokeless tobacco: Never   Vaping Use    Vaping Use: Never used   Substance Use Topics    Alcohol use: Not Currently     Comment: rarely    Drug use: Never     Family History   Problem Relation Age of Onset    Bipolar Disorder Mother     Hypertension Father     Lung Cancer Maternal Grandmother     Lung Cancer Maternal Grandfather     Lung Cancer Paternal Grandmother     Hirschsprung's Disease Son     Anxiety Disorder Daughter     Glaucoma No family hx of     Macular Degeneration No family hx of      Current Outpatient Medications   Medication    cholecalciferol (VITAMIN D3) 125 mcg (5000 units) capsule    estradiol (VAGIFEM) 10 MCG TABS vaginal tablet    lisinopril (ZESTRIL) 10 MG tablet    LORazepam (ATIVAN) 0.5 MG tablet    " PARoxetine (PAXIL) 30 MG tablet    ZEPOSIA 0.92 MG CAPS    oxybutynin ER (DITROPAN XL) 10 MG 24 hr tablet    phenazopyridine (PYRIDIUM) 100 MG tablet    phenazopyridine (PYRIDIUM) 100 MG tablet     No current facility-administered medications for this visit.     Allergies   Allergen Reactions    Penicillins      Other reaction(s): *Unknown       Physical Examination   Vitals: /81 (BP Location: Right arm, Patient Position: Sitting, Cuff Size: Adult Regular)   Pulse 85   Wt 84.9 kg (187 lb 3.2 oz)   SpO2 98%   BMI 31.19 kg/m     General: Patient appears comfortable in no acute distress.   HEENT: NC/AT, no icterus, moist mucous membranes  Chest: non-labored on RA  Extremities: Warm, no edema  Skin: No rash or lesion   Psych: Affect appropriate for situation   Neuro:  Mental status: Awake, alert, attentive. Language is fluent with intact comprehension.   Cranial nerves: pupils equal, conjugate gaze, EOMI,   Fundoscopic palor on the right  R APD?  face symmetric, shoulder shrug strong, tongue protrusion midline, no dysarthria.   Motor: Normal muscle bulk and tone. No abnormal movements.       R L  Deltoid  4 5  Biceps  5 5  Triceps  5 5  Wrist Flexion  5 5  Wrist Ext 5 5  Finger Ext  5 5      Hip flexion 4+        4+  Knee flexion 5 5  Knee ext 5 5  Dorsiflexion 5 5    Reflexes: brisk reflexes symmetric in biceps, brachioradialis, patellae, and achilles. 3 beats on right and 2 on left ankle of clonus,   Sensory: Intact to light touch. Romberg is with a very subtle sway.   Coordination: FNF without ataxia or dysmetria.    Gait: Normal width, external rotation of both legs, reduced stride length, turn, with symmetric arm swing. Tandem walk intact fwd with legs ext rotated. Able to rise from chair with arms crossed. Not able to rise from chair with each leg individually. Able to balance on each leg for > 2 seconds individually better on the right    Assessment/Plan:  Shakila Kapoor is a 58 year old female who  "presents for follow up for multiple sclerosis with symptoms of right optic neuritis and impaired vision on right eye, on Zeposia tolerating well but concerned about insurance coverage. She also has been having more gait instability and falling into the walls at times. On examination today it is significant for brisk reflexes b/l with clonus at both ankles worse on the right than left, impaired vision on the right eye even to just light, with right APD and disc pallor, difficulty with tandem gait and inability to stand with each leg.     # multiple sclerosis   # urinary urgency   # gait instability   # nerve sheath meningioma      -continue Zeposia  (working on coverage, needs to start external appeal review and dispute externally and we will continue to work on the \"free drug\" program)  -We filled out paperwork for disability parking permit  -continue  working with urology for bladder stimulator and educated to avoid triggers like caffeine  -counseled on daily exercise recommendations  -CBC, LFTs labs today    -Increased Vit D to 5000 units every other day with 10,000 every other day(goal range of 60-90 mcg/L)  -return 6 months        Patient seen and discussed with Dr. Harris.  I have reviewed the plan with the patient, who is in agreement.    Kizzy Epstein DO  Neurology Resident, PGY-4    Attending physician: I saw and evaluated the patient with Dr. Epstein, and I agree with her findings and plan of care as documented above, with the following additions.    The patient is clinically stable with no evidence of active inflammatory demyelination on disease modifying therapy with ozanimod. Today, we will check routine laboratory studies for monitoring of this medication to include complete blood counts with differential and hepatic panel.    Imaging of the brain is being performed through radiation oncology for monitoring of previously treated meningioma, and is scheduled next May. I will see her back in " about 6 months, subsequent to the next scan.    Unfortunately, her insurance company is demanding that she switch to a different S1PR blocker. In my opinion, this is medically inappropriate and I told her that an independent external review should be requested. We will try to work with the drug company to make sure that she is able to remain on medication.    Remainder as above.    Daniel Harris MD   of Neurology  [facility]    Diagnoses:    1) Multiple sclerosis  2) Optic nerve sheath meningioma  3) Gait disturbance  4) Neurogenic bladder  5) Vitamin D deficiency              Again, thank you for allowing me to participate in the care of your patient.      Sincerely,    Daniel Harris MD

## 2023-11-16 NOTE — PROGRESS NOTES
"Neurology Clinic Follow Up Visit    Reason: Multiple Sclerosis       11/16/2023   Source of information: Patient and chart review    History of Present Symptom:  Shakila Kapoor is a 58 year old female who presents today for follow up for multiple sclerosis, as planned.     The patient was last seen as of:  5/2023    Disease onset: At age 20 left side sensory symptoms and probable optic neuritis. She was initially thought to have probable/possible MS. She did not have any neurologic changes until 2019 when she developed progressive vision loss in the right eye. 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 She then did develop demyelinating disease on MRI and was started on Rebif. She has significant vision loss in right eye.      Previous disease modifying therapy: Rebif (ragiologic disease progression), then 2021 ozanimod (currently on this but is worried about insurance coverage and has only 1 month left)     She did have a C4-6 ACDF in the last year, that went well.     Today in meeting Shakila, she has been doing well. She does tell me she has a UTI in September for 1 month, and on a 3 month course of antibiotics and has an appointment to learn to self cath, and is interested in bladder stim (Medtronic's interstim device) that is MRI compatible. She states she has had no any new visual, sensory or motor symptoms suggestive of recurrent inflammation in the optic nerves, brainstem or spinal cord. She has no useful vision in the right eye. No concerns with ozanimod and is taking consistently but is concerned about insurance coverage and is working on the patient assistance with \"free drug\".    Symptom management:  Fatigue:  Low energy and tiredness continues to be a problem, stable, definitely worsened with UTI.   Bowel/bladder changes: Some urgency that is unchanged, she does have " "incont (worse during UTI last month) but stable, occurring daily with small leakage due to urgency. No longer taking Oxybutynin since UTI.   Gait/balance: She has fallen into the wall a few times lately. No other gait changes (\"wonky gait for always\")    The patient's medical, surgical, social, and family history were personally reviewed with the patient.  Past Medical History:   Diagnosis Date    Benign essential hypertension 05/25/2022    Kidney stone 10/25/2023    Meningioma (H)     Mixed hyperlipidemia 05/26/2022    Multiple sclerosis (H)       Past Surgical History:   Procedure Laterality Date    DISCECTOMY, FUSION CERVICAL ANTERIOR TWO LEVELS, COMBINED N/A 12/20/2022    Procedure: Anterior Cervical 4-6 Fusion with interbody cages and cervical plate;  Surgeon: Be Solorzano MD;  Location:  OR     Social History     Tobacco Use    Smoking status: Never    Smokeless tobacco: Never   Vaping Use    Vaping Use: Never used   Substance Use Topics    Alcohol use: Not Currently     Comment: rarely    Drug use: Never     Family History   Problem Relation Age of Onset    Bipolar Disorder Mother     Hypertension Father     Lung Cancer Maternal Grandmother     Lung Cancer Maternal Grandfather     Lung Cancer Paternal Grandmother     Hirschsprung's Disease Son     Anxiety Disorder Daughter     Glaucoma No family hx of     Macular Degeneration No family hx of      Current Outpatient Medications   Medication    cholecalciferol (VITAMIN D3) 125 mcg (5000 units) capsule    estradiol (VAGIFEM) 10 MCG TABS vaginal tablet    lisinopril (ZESTRIL) 10 MG tablet    LORazepam (ATIVAN) 0.5 MG tablet    PARoxetine (PAXIL) 30 MG tablet    ZEPOSIA 0.92 MG CAPS    oxybutynin ER (DITROPAN XL) 10 MG 24 hr tablet    phenazopyridine (PYRIDIUM) 100 MG tablet    phenazopyridine (PYRIDIUM) 100 MG tablet     No current facility-administered medications for this visit.     Allergies   Allergen Reactions    Penicillins      Other reaction(s): " *Unknown       Physical Examination   Vitals: /81 (BP Location: Right arm, Patient Position: Sitting, Cuff Size: Adult Regular)   Pulse 85   Wt 84.9 kg (187 lb 3.2 oz)   SpO2 98%   BMI 31.19 kg/m     General: Patient appears comfortable in no acute distress.   HEENT: NC/AT, no icterus, moist mucous membranes  Chest: non-labored on RA  Extremities: Warm, no edema  Skin: No rash or lesion   Psych: Affect appropriate for situation   Neuro:  Mental status: Awake, alert, attentive. Language is fluent with intact comprehension.   Cranial nerves: pupils equal, conjugate gaze, EOMI,   Fundoscopic palor on the right  R APD?  face symmetric, shoulder shrug strong, tongue protrusion midline, no dysarthria.   Motor: Normal muscle bulk and tone. No abnormal movements.       R L  Deltoid  4 5  Biceps  5 5  Triceps  5 5  Wrist Flexion  5 5  Wrist Ext 5 5  Finger Ext  5 5      Hip flexion 4+        4+  Knee flexion 5 5  Knee ext 5 5  Dorsiflexion 5 5    Reflexes: brisk reflexes symmetric in biceps, brachioradialis, patellae, and achilles. 3 beats on right and 2 on left ankle of clonus,   Sensory: Intact to light touch. Romberg is with a very subtle sway.   Coordination: FNF without ataxia or dysmetria.    Gait: Normal width, external rotation of both legs, reduced stride length, turn, with symmetric arm swing. Tandem walk intact fwd with legs ext rotated. Able to rise from chair with arms crossed. Not able to rise from chair with each leg individually. Able to balance on each leg for > 2 seconds individually better on the right    Assessment/Plan:  Shakila Kapoor is a 58 year old female who presents for follow up for multiple sclerosis with symptoms of right optic neuritis and impaired vision on right eye, on Zeposia tolerating well but concerned about insurance coverage. She also has been having more gait instability and falling into the walls at times. On examination today it is significant for brisk reflexes b/l with  "clonus at both ankles worse on the right than left, impaired vision on the right eye even to just light, with right APD and disc pallor, difficulty with tandem gait and inability to stand with each leg.     # multiple sclerosis   # urinary urgency   # gait instability   # nerve sheath meningioma      -continue Zeposia  (working on coverage, needs to start external appeal review and dispute externally and we will continue to work on the \"free drug\" program)  -We filled out paperwork for disability parking permit  -continue  working with urology for bladder stimulator and educated to avoid triggers like caffeine  -counseled on daily exercise recommendations  -CBC, LFTs labs today    -Increased Vit D to 5000 units every other day with 10,000 every other day(goal range of 60-90 mcg/L)  -return 6 months        Patient seen and discussed with Dr. Harris.  I have reviewed the plan with the patient, who is in agreement.    Kizzy Epstein DO  Neurology Resident, PGY-4    Attending physician: I saw and evaluated the patient with Dr. Epstein, and I agree with her findings and plan of care as documented above, with the following additions.    The patient is clinically stable with no evidence of active inflammatory demyelination on disease modifying therapy with ozanimod. Today, we will check routine laboratory studies for monitoring of this medication to include complete blood counts with differential and hepatic panel.    Imaging of the brain is being performed through radiation oncology for monitoring of previously treated meningioma, and is scheduled next May. I will see her back in about 6 months, subsequent to the next scan.    Unfortunately, her insurance company is demanding that she switch to a different S1PR blocker. In my opinion, this is medically inappropriate and I told her that an independent external review should be requested. We will try to work with the drug company to make sure that she is able to " remain on medication.    Remainder as above.    Daniel Harris MD   of Neurology  [facility]    Diagnoses:    1) Multiple sclerosis  2) Optic nerve sheath meningioma  3) Gait disturbance  4) Neurogenic bladder  5) Vitamin D deficiency

## 2023-11-16 NOTE — NURSING NOTE
Chief Complaint   Patient presents with    MS    RECHECK     6 month follow up      Vitals were taken and medications were reconciled.   Ramez Acosta, EMT  7:57 AM

## 2023-11-18 ENCOUNTER — MYC MEDICAL ADVICE (OUTPATIENT)
Dept: NEUROLOGY | Facility: CLINIC | Age: 58
End: 2023-11-18
Payer: COMMERCIAL

## 2023-11-28 DIAGNOSIS — I10 BENIGN ESSENTIAL HYPERTENSION: ICD-10-CM

## 2023-11-29 RX ORDER — LISINOPRIL 10 MG/1
10 TABLET ORAL DAILY
Qty: 90 TABLET | Refills: 0 | Status: SHIPPED | OUTPATIENT
Start: 2023-11-29 | End: 2024-01-11

## 2023-12-01 NOTE — TELEPHONE ENCOUNTER
Spoke with Radha to obtain next steps for 2nd level appeal request. The representative stated can send in the request to fax # [phone] and be sure to notate it is a 2nd level appeal request.    Thank you,    Jasmyn Garcia [facility]  Specialty Pharmacy Clinic Liaison - [facility] [address]  [address] [place] [address]  Ph: [phone] Fax: [phone]  [email or web address]

## 2023-12-08 ENCOUNTER — OFFICE VISIT (OUTPATIENT)
Dept: NEUROSURGERY | Facility: CLINIC | Age: 58
End: 2023-12-08
Payer: COMMERCIAL

## 2023-12-08 ENCOUNTER — ANCILLARY PROCEDURE (OUTPATIENT)
Dept: CT IMAGING | Facility: CLINIC | Age: 58
End: 2023-12-08
Attending: NEUROLOGICAL SURGERY
Payer: COMMERCIAL

## 2023-12-08 VITALS
BODY MASS INDEX: 30.99 KG/M2 | OXYGEN SATURATION: 98 % | SYSTOLIC BLOOD PRESSURE: 129 MMHG | DIASTOLIC BLOOD PRESSURE: 75 MMHG | WEIGHT: 186 LBS | RESPIRATION RATE: 16 BRPM | HEART RATE: 80 BPM

## 2023-12-08 DIAGNOSIS — G89.29 CHRONIC LOW BACK PAIN, UNSPECIFIED BACK PAIN LATERALITY, UNSPECIFIED WHETHER SCIATICA PRESENT: Primary | ICD-10-CM

## 2023-12-08 DIAGNOSIS — M54.50 CHRONIC LOW BACK PAIN, UNSPECIFIED BACK PAIN LATERALITY, UNSPECIFIED WHETHER SCIATICA PRESENT: Primary | ICD-10-CM

## 2023-12-08 DIAGNOSIS — Z98.1 S/P CERVICAL SPINAL FUSION: ICD-10-CM

## 2023-12-08 PROCEDURE — 72125 CT NECK SPINE W/O DYE: CPT | Mod: GC | Performed by: RADIOLOGY

## 2023-12-08 PROCEDURE — 99214 OFFICE O/P EST MOD 30 MIN: CPT | Performed by: NEUROLOGICAL SURGERY

## 2023-12-08 ASSESSMENT — PAIN SCALES - GENERAL: PAINLEVEL: NO PAIN (0)

## 2023-12-08 NOTE — PROGRESS NOTES
Date of service: 12/8/2023    Shakila Kapoor is a 58-year-old female who is 1 year status post C4-C6 anterior cervical fusion.    Today she reports that she is doing very well without any symptoms.  All her preoperative symptoms have resolved.  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    A new issue today is that that she has been having some worsening low back pain especially over the right iliac crest.  She had pain radiating into the right buttock.  Her recent CT scan of the abdomen/bladder reportedly showed a slippage at L5-S1.    neuro intact.    I have personally reviewed the cervical spine CT which shows a solid fusion from C4-C6.    Impression/plan:  Shakila is doing very well overall from the cervical spine standpoint.    For her lower lumbar pain, I will request a lumbar spine MRI scan for further evaluation.    Pooja Solorzano MD.

## 2023-12-08 NOTE — LETTER
12/8/2023       RE: Shakila Kapoor  [address] [place] [address]       Dear Colleague,    Thank you for referring your patient, Shakila Kapoor, to the [facility] [place] [facility]. Please see a copy of my visit note below.    Date of service: 12/8/2023    Shakila Kapoor is a 58-year-old female who is 1 year status post C4-C6 anterior cervical fusion.    Today she reports that she is doing very well without any symptoms.  All her preoperative symptoms have resolved.  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    A new issue today is that that she has been having some worsening low back pain especially over the right iliac crest.  She had pain radiating into the right buttock.  Her recent CT scan of the abdomen/bladder reportedly showed a slippage at L5-S1.    neuro intact.  I have personally reviewed the cervical spine CT which shows a solid fusion from C4-C6.    Impression/plan:  Shakila is doing very well overall from the cervical spine standpoint.    For her lower lumbar pain, I will request a lumbar spine MRI scan for further evaluation.      Again, thank you for allowing me to participate in the care of your patient.      Sincerely,    Be Solorzano MD

## 2023-12-08 NOTE — TELEPHONE ENCOUNTER
Discussed with Dr Harris. Second level appeal letter written.    Also called [employer]ep[employer]ia [employer] to inquire about temporary supply while pursuing second level appeal, as pt is low on medication. They stated that pt is not fully enrolled with the hub, would need start form sent in. This is confusing as pt has been on Zeposia since 2021, and has received copay assistance from them in the past. It is possible this is related to the change of the hub in the summer of 2023. Link to zeposia start form sent to pt via [employer]. Per the representative at the hub, the pt would be eligible for bridge supply as long as she has commercial insurance.     Flower Barnett RN

## 2023-12-11 NOTE — TELEPHONE ENCOUNTER
MEDICATION SECOND LEVEL APPEAL INITIATED    Medication: ZEPOSIA 0.92 MG PO CAPS  Second Level Appeal Start Date: 12/11/2023  Insurance Company: [employer]  Insurance Phone: [phone]  Insurance Fax: [phone]  Additional Information:Faxed 2nd level appeal  letter along with PA denial and appeal denial letters to [employer] for review.     Thank you,    Jasmyn Garcia [facility]  Specialty Pharmacy Clinic Liaison - CardiologyNeurologyMultiple [address] [address]  Ph: [phone] Fax: [phone]  [email or web address]

## 2023-12-12 NOTE — TELEPHONE ENCOUNTER
Start form mailed to pt (in addition to link provided to pt), as [employer] states they do not have complete enrollment for pt. Prescriber portion placed in Dr Harris's folder for signature. Pt needs updated start form to potentially access temporary bridge supply of medication. Second level appeal in process.    Flower Barnett RN

## 2023-12-13 NOTE — TELEPHONE ENCOUNTER
Pt portion of start form received. Currently in Dr Harris's folder with MD portion of form.    Flower Barnett RN

## 2023-12-19 NOTE — TELEPHONE ENCOUNTER
Spoke with [employer]. They have received the enrollment form and the denials. They will do their benefits investigation and will reach out to pt. They will send bridge rx to [facility]. Again informed [place] [address] that this is urgent. Pt updated.     Flower Barnett RN

## 2023-12-19 NOTE — TELEPHONE ENCOUNTER
Called [place] [address] to confirm that the updated start form was received. They have not received the start form. Should be faxed to [phone]. If unable to get fax to go through on that number, can fax to [phone]. Informed [place] [address] that pt is now out of medication and an emergency supply is needed, and that second level appeal is currently in process. [employer] needs the updated start form to move forward.     Zeposia start form faxed to both fax numbers. Will ask pharmacy liaison to fax the two insurance denials.     Flower aBrnett RN

## 2023-12-21 NOTE — TELEPHONE ENCOUNTER
Patient with poor sleep overnight, increasing agitation. Trazodone, Lexapro given at bedtime, Seroquel given prn. Norco 5 given prn hip pain x 2. Patient's son at bedside. Despite all, patient frequently calling out \"help\", not always knowing what she needs help with. Provided frequent reorientation, repositioning, hygiene as needed. Snacks and fluids offered, patient with poor appetite but drinking well. Vital signs stable, patient afebrile. Pending evaluation by Psychiatry. Pt has received bridge supply of medication.    Flower Barnett RN

## 2023-12-22 NOTE — TELEPHONE ENCOUNTER
Spoke with Radha. The representative stated the 2nd level appeal is still pending under review. It can take up to 45 days to complete the review (some times longer if additional research is needed).    Acquired ZEPOSIA [employer] card processing information and added it to her profile at [facility]:    BIN: [id number]  PCN: [id number]  Group: [id number]  ID: [id number]    Thank you,    Jasmyn Garcia [facility]  Specialty Pharmacy Clinic Liaison - CardiologyNeurologyMultiple [facility] [address]  [address] [place] [address]  Ph: [phone] Fax: [phone]  [email or web address]

## 2023-12-27 NOTE — TELEPHONE ENCOUNTER
MEDICATION SECOND LEVEL APPEAL INITIATED    Medication: ZEPOSIA 0.92 MG PO CAPS  Second Level Appeal Start Date: 12/11/2023  Insurance Company: [employer]  Insurance Phone: [phone]  Insurance Fax: [phone]  Additional Information: External review submitted with appeal letter and most recent office visit.     Thank you,    Jasmyn Garcia h-T  Specialty Pharmacy Clinic Liaison - CardiologyNeurologyMultiple [address] [address]  Ph: [phone] Fax: [phone]  [email or web address]

## 2024-01-11 ENCOUNTER — OFFICE VISIT (OUTPATIENT)
Dept: FAMILY MEDICINE | Facility: CLINIC | Age: 59
End: 2024-01-11
Payer: COMMERCIAL

## 2024-01-11 VITALS
RESPIRATION RATE: 25 BRPM | BODY MASS INDEX: 31.96 KG/M2 | TEMPERATURE: 98.2 F | OXYGEN SATURATION: 97 % | SYSTOLIC BLOOD PRESSURE: 120 MMHG | WEIGHT: 191.8 LBS | HEART RATE: 97 BPM | DIASTOLIC BLOOD PRESSURE: 70 MMHG | HEIGHT: 65 IN

## 2024-01-11 DIAGNOSIS — G95.29 OTHER CORD COMPRESSION (H): ICD-10-CM

## 2024-01-11 DIAGNOSIS — N39.0 RECURRENT UTI: Primary | ICD-10-CM

## 2024-01-11 DIAGNOSIS — Z23 NEED FOR VACCINATION: ICD-10-CM

## 2024-01-11 DIAGNOSIS — G35 MULTIPLE SCLEROSIS (H): ICD-10-CM

## 2024-01-11 DIAGNOSIS — I10 BENIGN ESSENTIAL HYPERTENSION: ICD-10-CM

## 2024-01-11 DIAGNOSIS — R93.5 ABNORMAL COMPUTED TOMOGRAPHY OF ABDOMEN AND PELVIS: ICD-10-CM

## 2024-01-11 DIAGNOSIS — D32.0 PRIMARY MENINGIOMA OF OPTIC NERVE SHEATH (H): ICD-10-CM

## 2024-01-11 PROCEDURE — 99214 OFFICE O/P EST MOD 30 MIN: CPT | Performed by: NURSE PRACTITIONER

## 2024-01-11 PROCEDURE — 90480 ADMN SARSCOV2 VAC 1/ONLY CMP: CPT | Performed by: NURSE PRACTITIONER

## 2024-01-11 PROCEDURE — 91320 SARSCV2 VAC 30MCG TRS-SUC IM: CPT | Performed by: NURSE PRACTITIONER

## 2024-01-11 RX ORDER — LISINOPRIL 10 MG/1
10 TABLET ORAL DAILY
Qty: 90 TABLET | Refills: 1 | Status: SHIPPED | OUTPATIENT
Start: 2024-01-11 | End: 2024-07-31

## 2024-01-11 ASSESSMENT — PAIN SCALES - GENERAL: PAINLEVEL: NO PAIN (0)

## 2024-01-11 NOTE — PROGRESS NOTES
"  Assessment & Plan     Recurrent UTI  Managed/followed by Urology and patient has upcoming follow up appointment with her Urologist.    Abnormal computed tomography of abdomen and pelvis  Due for follow up postcontrast exam in 3 months (due 1/18/24) for enlarged retroperitoneal lymph nodes on prior CT abdomen and pelvis which was incidentally found when she had prior imaging for recurrent UTI work-up by her Urologist  - CT Abdomen Pelvis w/o & w Contrast; Future    Benign essential hypertension  Chronic, stable, continue current treatment.   - lisinopril (ZESTRIL) 10 MG tablet; Take 1 tablet (10 mg) by mouth daily    Need for vaccination    - COVID-19 12+ (2023-24) (PFIZER)    Multiple sclerosis (H)  Known issue that I take into account for their medical decisions, no current exacerbations or new concerns.     Other cord compression (H)  Known issue that I take into account for their medical decisions, no current exacerbations or new concerns.     Primary meningioma of optic nerve sheath (H)  Known issue that I take into account for their medical decisions, no current exacerbations or new concerns.     Follow up in 5-6 months for annual visit/hypertension follow up.             BMI:   Estimated body mass index is 32.02 kg/m  as calculated from the following:    Height as of this encounter: 1.648 m (5' 4.9\").    Weight as of this encounter: 87 kg (191 lb 12.8 oz).         Margarita Suazo NP  [facility]    Alyssa Jhaveri is a 58 year old, presenting for the following health issues:  RECHECK ([place] at [place] [facility] )        1/11/2024     7:04 AM   Additional Questions   Roomed by Jeanine LIVINGSTON       History of Present Illness       Reason for visit:  Enlarged lymph in abdomenShe consumes 0 sweetened beverage(s) daily.She exercises with enough effort to increase her heart rate 20 to 29 minutes per day.  She exercises with enough effort to increase her heart rate 5 days per week.   She is taking " "medications regularly.     Patient brought in a copy of the CT - 10/18/2023 from [employer]      Additional provider notes:    Still seeing Urology, will see them next Monday for follow up.  Was asked to self cath but says she has not been able to do this.  Cephalexin 250 mg capsule she has been taking for 3 months per Urology    Follow up postcontrast exam in 3 months (due 1/18/24) for enlarged retroperitoneal lymph nodes on prior CT abdomen and pelvis which was incidentally found.        Review of Systems   Constitutional, HEENT, cardiovascular, pulmonary, gi and gu systems are negative, except as otherwise noted.      Objective    /70 (BP Location: Right arm, Patient Position: Sitting, Cuff Size: Adult Large)   Pulse 97   Temp 98.2  F (36.8  C) (Oral)   Resp 25   Ht 1.648 m (5' 4.9\")   Wt 87 kg (191 lb 12.8 oz)   SpO2 97%   BMI 32.02 kg/m    Body mass index is 32.02 kg/m .  Physical Exam   GENERAL: healthy, alert and no distress  RESP: lungs clear to auscultation - no rales, rhonchi or wheezes  CV: regular rates and rhythm, normal S1 S2, no S3 or S4, and no murmur, click or rub  PSYCH: mentation appears normal, affect normal/bright                      "

## 2024-01-11 NOTE — TELEPHONE ENCOUNTER
MEDICATION SECOND LEVEL APPEAL DENIED    Medication: ZEPOSIA 0.92 MG PO CAPS  Insurance Company: [employer]  Denial Reason(s): Need trial and failure of fingolimod or Mayzent    Denial Date: 1/5/2024  Patient Notified: No    No other options for appeal.

## 2024-01-11 NOTE — TELEPHONE ENCOUNTER
Called [facility] to check status of appeal. Rep confirmed the case was completed and the result was mailed. Rep would not tell liaison what the result was. Rep also would not fax the result. Stated we can call back if not received in a few days and they can fax it then.

## 2024-01-11 NOTE — TELEPHONE ENCOUNTER
Discussed with Dr Harris who recommends change to Mayzent. Pt will need the genetic testing that is required prior to Mayzent start.  I will contact pt to discuss.    Flower Barnett RN

## 2024-01-11 NOTE — NURSING NOTE
Prior to immunization administration, verified patients identity using patient s name and date of birth. Please see Immunization Activity for additional information.     Screening Questionnaire for Adult Immunization    Are you sick today?   No   Do you have allergies to medications, food, a vaccine component or latex?   No   Have you ever had a serious reaction after receiving a vaccination?   No   Do you have a long-term health problem with heart, lung, kidney, or metabolic disease (e.g., diabetes), asthma, a blood disorder, no spleen, complement component deficiency, a cochlear implant, or a spinal fluid leak?  Are you on long-term aspirin therapy?   No   Do you have cancer, leukemia, HIV/AIDS, or any other immune system problem?   No   Do you have a parent, brother, or sister with an immune system problem?   No   In the past 3 months, have you taken medications that affect  your immune system, such as prednisone, other steroids, or anticancer drugs; drugs for the treatment of rheumatoid arthritis, Crohn s disease, or psoriasis; or have you had radiation treatments?   No   Have you had a seizure, or a brain or other nervous system problem?   No   During the past year, have you received a transfusion of blood or blood    products, or been given immune (gamma) globulin or antiviral drug?   No   For women: Are you pregnant or is there a chance you could become       pregnant during the next month?   No   Have you received any vaccinations in the past 4 weeks?   No     Immunization questionnaire answers were all negative.      Patient instructed to remain in clinic for 15 minutes afterwards, and to report any adverse reactions.     Screening performed by Crystal Reveles MA on 1/11/2024 at 8:02 AM.

## 2024-01-12 ENCOUNTER — MYC REFILL (OUTPATIENT)
Dept: FAMILY MEDICINE | Facility: CLINIC | Age: 59
End: 2024-01-12
Payer: COMMERCIAL

## 2024-01-12 ENCOUNTER — TELEPHONE (OUTPATIENT)
Dept: NEUROLOGY | Facility: CLINIC | Age: 59
End: 2024-01-12

## 2024-01-12 RX ORDER — PAROXETINE 30 MG/1
30 TABLET, FILM COATED ORAL EVERY EVENING
OUTPATIENT
Start: 2024-01-12

## 2024-01-12 NOTE — TELEPHONE ENCOUNTER
Second level appeal of Zeposia has been denied. Dr Harris recommends switch to Mayzent. Pt will need blood work to determine dose, which can be arranged by [employer]. Discussed with pt, who agrees with plan to switch to Mayzent. Link to start form PDF sent to pt in [employer] message, and paper form and brochure mailed to pt.     Advised pt to order refill of Zeposia through the bridge program she is currently on, as it may take a bit of time to get set up with Mayzent. Asked pharmacy liaison to submit PA for Mayzent.     Flower Barnett RN

## 2024-01-12 NOTE — TELEPHONE ENCOUNTER
Spoke with Shakila, who is OK with switch to Mayzent. Please submit PA for Mayzent. Thank you!    Flower Barnett RN

## 2024-01-15 ENCOUNTER — TRANSFERRED RECORDS (OUTPATIENT)
Dept: HEALTH INFORMATION MANAGEMENT | Facility: CLINIC | Age: 59
End: 2024-01-15
Payer: COMMERCIAL

## 2024-01-15 ENCOUNTER — TRANSCRIBE ORDERS (OUTPATIENT)
Dept: OTHER | Age: 59
End: 2024-01-15

## 2024-01-15 ENCOUNTER — TELEPHONE (OUTPATIENT)
Dept: NEUROLOGY | Facility: CLINIC | Age: 59
End: 2024-01-15

## 2024-01-15 DIAGNOSIS — R32 UNSPECIFIED URINARY INCONTINENCE: Primary | ICD-10-CM

## 2024-01-15 DIAGNOSIS — G35 MULTIPLE SCLEROSIS (H): Primary | ICD-10-CM

## 2024-01-15 NOTE — TELEPHONE ENCOUNTER
PA Initiation    Medication: MAYZENT STARTER PACK 12 X 0.25 MG PO TBPK  Insurance Company: [employer] - Phone [phone] Fax [phone]  Pharmacy Filling the Rx: [facility] - [place], [place] - [address] [place] [address] AVE   Filling Pharmacy Phone:    Filling Pharmacy Fax:    Start Date: 1/15/2024    Faxed Radha MITCHELL form review.    Thank you,    Jasmyn aGrcia [facility]  Specialty Pharmacy Clinic Liaison - CardiologyNeurologyMult[facility] [address]  [address]rd [place] [address]  Ph: [phone] Fax: [phone]  [email or web address]

## 2024-01-19 NOTE — TELEPHONE ENCOUNTER
Verifying clinic questions:        Thank you,    Jasmyn Garcia h-T  Specialty Pharmacy Clinic Liaison - CardiologyNeurologyMultiple Sclerosis  [facility] [address] [place] [address]  Ph: [phone] Fax: [phone]  [email or web address]

## 2024-01-19 NOTE — TELEPHONE ENCOUNTER
This has not been tested yet, so unable to answer. We will be requesting assistance from the drug company with this test (on the start form).    Flower Barnett RN

## 2024-01-19 NOTE — TELEPHONE ENCOUNTER
Prescriber portion of [employer] start form has been completed by Dr Harris, but we have not yet received the patient form with signature. Insurance is asking for result of genotype testing, so we will need to send start form in before insurance approval is in place (so that [employer] hub can assist with blood draw).     Called Shakila. She has received the start form and will complete ASAP. She will send in via [employer]. Unsure of how many Zeposia capsules she has left. If low, she will contact [employer] to see if she can order another month's worth.     Flower Barnett RN

## 2024-01-22 NOTE — TELEPHONE ENCOUNTER
Start form faxed to [employer]. PA not yet completed (genotype result needed for PA).    Flower Barnett RN

## 2024-01-22 NOTE — TELEPHONE ENCOUNTER
[employer] start form faxed to [employer] ([phone]). [employer] not yet approved by insurance, but pt will need genotype blood draw to determine dose (testing provided by [facility] hub).    Flower Barnett RN

## 2024-02-05 ENCOUNTER — TRANSFERRED RECORDS (OUTPATIENT)
Dept: HEALTH INFORMATION MANAGEMENT | Facility: CLINIC | Age: 59
End: 2024-02-05
Payer: COMMERCIAL

## 2024-02-12 ENCOUNTER — DOCUMENTATION ONLY (OUTPATIENT)
Dept: NEUROLOGY | Facility: CLINIC | Age: 59
End: 2024-02-12

## 2024-02-12 NOTE — PROGRESS NOTES
CYP2C9 genotyping siponimod lab report received from [employer] [facility], report placed in Dr. Harris's folder for review and signature.   Ramez Acosta EMT 02/12/2024 12:55PM    Use Enhanced Medication Counseling?: No

## 2024-02-14 NOTE — TELEPHONE ENCOUNTER
Genotype results have been received and are available in the media tab. Dr Harris, should we request coverage for the reduced 1 mg dose?    Flower Barnett RN

## 2024-02-15 NOTE — TELEPHONE ENCOUNTER
Per Dr Harris, pt should be on the 1 mg daily maintenance dose, after completing the starter pack for this dose (which includes seven 0.25 mg tablets for a 4 day supply). Titration instructions for the 1 mg dose:    Day 1: 1 x 0.25 mg  Day 2: 1 x 0.25 mg  Day 3: 2x 0.25 mg  Day 4: 3 x 0.25 mg  Followed by maintenance dose of 1 mg daily    Pt's CYP2C genotype is *1/*3.    Flower Barnett RN

## 2024-02-15 NOTE — TELEPHONE ENCOUNTER
Pt's genotype testing shows that pt should be on the 1 mg maintenance dose. PA request for that dose sent to pharmacy liaison. Pt informed via "[employer]. Clear for Therapy form faxed to [phone], indicating need for 1 mg dose and no FDO required.     Flower Barnett RN

## 2024-02-16 NOTE — TELEPHONE ENCOUNTER
PA submitted for Starter Kit to [employer] noting loading dose and 1MG Maintenance dose.    Thank you,    Jasmyn Garcia [facility]  Specialty Pharmacy Clinic Liaison - Cardiology[facility]urology[facility] [address]  [address] [place] [address]  Ph: [phone] Fax: [phone]  [email or web address]

## 2024-02-19 NOTE — TELEPHONE ENCOUNTER
Prior Authorization Approval    Medication: MAYZENT STARTER PACK 12 X 0.25 MG PO TBPK  Authorization Effective Date: 2/17/2024  Authorization Expiration Date: 2/16/2025  Approved Dose/Quantity: starter dose and 1mg maintenance dose  Reference #:     Insurance Company: [employer] - Phone [phone] Fax [phone]  Expected CoPay: $ 2,417.8  CoPay Card Available: Yes    Financial Assistance Needed: yes  Which Pharmacy is filling the prescription: [facility] - [place], [place] - [address]   Pharmacy Notified: yes  Patient Notified: yes

## 2024-02-20 NOTE — TELEPHONE ENCOUNTER
Updated start form for 1 mg maintenance dose placed in Dr Harris's folder for signature. Maile should already have patient authorization, as start form has previously been sent in.    Flower Barnett RN

## 2024-02-23 NOTE — TELEPHONE ENCOUNTER
Updated start form faxed to [facility] patient support ([phone]).    Flower Barnett RN

## 2024-02-27 ENCOUNTER — ANCILLARY PROCEDURE (OUTPATIENT)
Dept: MRI IMAGING | Facility: CLINIC | Age: 59
End: 2024-02-27
Attending: NEUROLOGICAL SURGERY
Payer: COMMERCIAL

## 2024-02-27 ENCOUNTER — ANCILLARY PROCEDURE (OUTPATIENT)
Dept: CT IMAGING | Facility: CLINIC | Age: 59
End: 2024-02-27
Attending: NURSE PRACTITIONER
Payer: COMMERCIAL

## 2024-02-27 DIAGNOSIS — G89.29 CHRONIC LOW BACK PAIN, UNSPECIFIED BACK PAIN LATERALITY, UNSPECIFIED WHETHER SCIATICA PRESENT: ICD-10-CM

## 2024-02-27 DIAGNOSIS — M54.50 CHRONIC LOW BACK PAIN, UNSPECIFIED BACK PAIN LATERALITY, UNSPECIFIED WHETHER SCIATICA PRESENT: ICD-10-CM

## 2024-02-27 DIAGNOSIS — R93.5 ABNORMAL COMPUTED TOMOGRAPHY OF ABDOMEN AND PELVIS: ICD-10-CM

## 2024-02-27 PROCEDURE — 74177 CT ABD & PELVIS W/CONTRAST: CPT | Mod: GC | Performed by: RADIOLOGY

## 2024-02-27 PROCEDURE — 72148 MRI LUMBAR SPINE W/O DYE: CPT | Performed by: STUDENT IN AN ORGANIZED HEALTH CARE EDUCATION/TRAINING PROGRAM

## 2024-02-27 RX ORDER — IOPAMIDOL 755 MG/ML
94 INJECTION, SOLUTION INTRAVASCULAR ONCE
Status: COMPLETED | OUTPATIENT
Start: 2024-02-27 | End: 2024-02-27

## 2024-02-27 RX ADMIN — IOPAMIDOL 94 ML: 755 INJECTION, SOLUTION INTRAVASCULAR at 07:00

## 2024-02-27 NOTE — DISCHARGE INSTRUCTIONS

## 2024-02-28 ENCOUNTER — TRANSFERRED RECORDS (OUTPATIENT)
Dept: HEALTH INFORMATION MANAGEMENT | Facility: CLINIC | Age: 59
End: 2024-02-28
Payer: COMMERCIAL

## 2024-02-29 ENCOUNTER — TELEPHONE (OUTPATIENT)
Dept: GASTROENTEROLOGY | Facility: CLINIC | Age: 59
End: 2024-02-29

## 2024-02-29 NOTE — TELEPHONE ENCOUNTER
"Endoscopy Scheduling Screen    Have you had a positive Covid test in the last 14 days?  No    Are you active on MyChart?   Yes    What insurance is in the chart?  Other:  [facility]    Ordering/Referring Provider:     Margarita Suazo NP in [facility]/      (If ordering provider performs procedure, schedule with ordering provider unless otherwise instructed. )    BMI: Estimated body mass index is 32.02 kg/m  as calculated from the following:    Height as of 1/11/24: 1.648 m (5' 4.9\").    Weight as of 1/11/24: 87 kg (191 lb 12.8 oz).     Sedation Ordered  moderate sedation.   If patient BMI > 50 do not schedule in [facility].    If patient BMI > 45 do not schedule at [facility].    Are you taking methadone or Suboxone?  No    Have you had difficulties, pain, or discomfort during past endoscopy procedures?  No    Are you taking any prescription medications for pain 3 or more times per week?   NO - No RN review required.    Do you have a history of malignant hyperthermia or adverse reaction to anesthesia?  No    (Females) Are you currently pregnant?   No     Have you been diagnosed or told you have pulmonary hypertension?   No    Do you have an LVAD?  No    Have you been told you have moderate to severe sleep apnea?  No    Have you been told you have COPD, asthma, or any other lung disease?  No    Do you have any heart conditions?  No     Have you ever had an organ transplant?   No    Have you ever had or are you awaiting a heart or lung transplant?   No    Have you had a stroke or transient ischemic attack (TIA aka \"mini stroke\" in the last 6 months?   No    Have you been diagnosed with or been told you have cirrhosis of the liver?   No    Are you currently on dialysis?   No    Do you need assistance transferring?   No    BMI: Estimated body mass index is 32.02 kg/m  as calculated from the following:    Height as of 1/11/24: 1.648 m (5' 4.9\").    Weight as of 1/11/24: 87 kg (191 lb 12.8 oz).     Is patients BMI > 40 and " scheduling location [facility]?  No    Do you take an injectable medication for weight loss or diabetes (excluding insulin)?  No    Do you take the medication Naltrexone?  No    Do you take blood thinners?  No       Prep   Are you currently on dialysis or do you have chronic kidney disease?  No    Do you have a diagnosis of diabetes?  No    Do you have a diagnosis of cystic fibrosis (CF)?  No    On a regular basis do you go 3 -5 days between bowel movements?  Yes (Extended Prep)    BMI > 40?  No    Preferred Pharmacy:    [facility] - [place], [place] - [address]   [address] [facility] [address]  Phone: [phone] Fax: [phone]      Final Scheduling Details   Colonoscopy prep sent?  N/A    Procedure scheduled  Colonoscopy    Surgeon:  Chidi Mcneil     Date of procedure:  03/06/2024     Pre-OP / PAC:   No - Not required for this site.    Location  [facility]  Patient was willing to go anywhere to get this taken care of.     Sedation   Moderate Sedation - Per order.      Patient Reminders:   You will receive a call from a Nurse to review instructions and health history.  This assessment must be completed prior to your procedure.  Failure to complete the Nurse assessment may result in the procedure being cancelled.      On the day of your procedure, please designate an adult(s) who can drive you home stay with you for the next 24 hours. The medicines used in the exam will make you sleepy. You will not be able to drive.      You cannot take public transportation, ride share services, or non-medical taxi service without a responsible caregiver.  Medical transport services are allowed with the requirement that a responsible caregiver will receive you at your destination.  We require that drivers and caregivers are confirmed prior to your procedure.

## 2024-02-29 NOTE — TELEPHONE ENCOUNTER
Pre visit planning completed.      Procedure details:    Patient scheduled for Colonoscopy  on 3/6/2024.     Arrival time: 1230. Procedure time 1330    Pre op exam needed? N/A    Facility location: [facility]; [address] [place] [address], [address] Floor, [place] [address]    Sedation type: Conscious sedation     Indication for procedure: Abnormality of rectum [K62.9]       Chart review:     Electronic implanted devices? No    Recent diagnosis of diverticulitis within the last 6 weeks? No    Diabetic? No    Diabetic medication HOLDING recommendations: (if applicable)  Oral diabetic medications: N/A  Diabetic injectables: N/A  Insulin: N/A      Medication review:    Anticoagulants? No    NSAIDS? No NSAID medications per patient's medication list.  RN will verify with pre-assessment call.    Other medication HOLDING recommendations:  N/A      Prep for procedure:     Bowel prep recommendation: Standard Miralax   Due to:  standard bowel prep.    Prep instructions sent via [employer]       Nikkie Murcia RN  Endoscopy Procedure Pre Assessment RN  [phone] option 4

## 2024-02-29 NOTE — TELEPHONE ENCOUNTER
Pre assessment completed for upcoming procedure.   (Please see previous telephone encounter notes for complete details)       Procedure details:    Arrival time and facility location reviewed.    Pre op exam needed? N/A    Designated  policy reviewed. Instructed to have someone stay 6  hours post procedure.       Medication review:    Medications reviewed. Please see supporting documentation below. Holding recommendations discussed (if applicable).       Prep for procedure:     Procedure prep instructions reviewed.        Any additional information needed:  N/A      Patient  verbalized understanding and had no questions or concerns at this time.      Nikkie Murcia RN  Endoscopy Procedure Pre Assessment RN  [phone] option 4

## 2024-03-06 ENCOUNTER — HOSPITAL ENCOUNTER (OUTPATIENT)
Facility: CLINIC | Age: 59
Discharge: HOME | End: 2024-03-06
Attending: INTERNAL MEDICINE | Admitting: INTERNAL MEDICINE
Payer: COMMERCIAL

## 2024-03-06 VITALS
SYSTOLIC BLOOD PRESSURE: 116 MMHG | HEART RATE: 107 BPM | DIASTOLIC BLOOD PRESSURE: 80 MMHG | OXYGEN SATURATION: 100 % | RESPIRATION RATE: 16 BRPM

## 2024-03-06 LAB — COLONOSCOPY: NORMAL

## 2024-03-06 PROCEDURE — 250N000011 HC RX IP 250 OP 636: Performed by: INTERNAL MEDICINE

## 2024-03-06 PROCEDURE — 45380 COLONOSCOPY AND BIOPSY: CPT | Performed by: INTERNAL MEDICINE

## 2024-03-06 PROCEDURE — 88305 TISSUE EXAM BY PATHOLOGIST: CPT | Mod: TC | Performed by: INTERNAL MEDICINE

## 2024-03-06 PROCEDURE — G0500 MOD SEDAT ENDO SERVICE >5YRS: HCPCS | Performed by: INTERNAL MEDICINE

## 2024-03-06 PROCEDURE — 45385 COLONOSCOPY W/LESION REMOVAL: CPT | Performed by: INTERNAL MEDICINE

## 2024-03-06 PROCEDURE — 88305 TISSUE EXAM BY PATHOLOGIST: CPT | Mod: 26 | Performed by: PATHOLOGY

## 2024-03-06 RX ORDER — LIDOCAINE 40 MG/G
CREAM TOPICAL
Status: DISCONTINUED | OUTPATIENT
Start: 2024-03-06 | End: 2024-03-06 | Stop reason: HOSPADM

## 2024-03-06 RX ORDER — ONDANSETRON 2 MG/ML
4 INJECTION INTRAMUSCULAR; INTRAVENOUS
Status: DISCONTINUED | OUTPATIENT
Start: 2024-03-06 | End: 2024-03-06 | Stop reason: HOSPADM

## 2024-03-06 RX ORDER — FENTANYL CITRATE 50 UG/ML
INJECTION, SOLUTION INTRAMUSCULAR; INTRAVENOUS PRN
Status: DISCONTINUED | OUTPATIENT
Start: 2024-03-06 | End: 2024-03-06 | Stop reason: HOSPADM

## 2024-03-06 ASSESSMENT — ACTIVITIES OF DAILY LIVING (ADL)
ADLS_ACUITY_SCORE: 38
ADLS_ACUITY_SCORE: 38

## 2024-03-06 NOTE — H&P
[facility] Anesthesia Pre-op History and Physical    Shakila Kapoor MRN# [id number]   Age: 58 year old YOB: 1965      Date of Surgery: 3/6/2024 [facility]      Date of Exam 3/6/2024 Facility (Same day)       Home clinic: unknown  Primary care provider: Margarita Suazo         Chief Complaint and/or Reason for Procedure:   No chief complaint on file.           Active problem list:     Patient Active Problem List    Diagnosis Date Noted    Recurrent urinary tract infection 11/02/2023     Priority: Medium     [place] [facility]      Kidney stone 10/25/2023     Priority: Medium    Atrophic vaginitis 09/14/2023     Priority: Medium    Other cord compression (H) 02/14/2023     Priority: Medium    S/P cervical spinal fusion 12/20/2022     Priority: Medium    Mixed hyperlipidemia 05/26/2022     Priority: Medium    Benign essential hypertension 05/25/2022     Priority: Medium    Optic neuritis 08/03/2021     Priority: Medium    Primary meningioma of optic nerve sheath (H) 03/26/2021     Priority: Medium    Anxiety 01/13/2021     Priority: Medium    Multiple sclerosis (H) 01/13/2021     Priority: Medium     possible/probable- diagnosed at age 21, flare up just before marriage      Cervical high risk HPV (human papillomavirus) test positive 05/01/2017     Priority: Medium     5/18/17 NIL/HPV positive, HPV 16/18 negative   7/2019 NIL/HPV+ type unknown  9/17/19 [facility]: ECC benign Plan:Pap/HPV due 9/2020  All above in care everywhere  06/1/23 NIL Pap, Neg HR HPV Plan cotest in 3 years               Medications (include herbals and vitamins):   Any Plavix use in the last 7 days? No     No current facility-administered medications for this encounter.             Allergies:      Allergies   Allergen Reactions    Penicillins      Other reaction(s): *Unknown     Allergy to Latex? No  Allergy to tape?   No  Intolerances: None            Physical Exam:   All vitals have  been reviewed  Patient Vitals for the past 8 hrs:   BP Pulse Resp SpO2   03/06/24 1250 139/85 118 16 97 %     No intake/output data recorded.  Airway assessment:   Patient is able to open mouth wide  Patient is able to stick out tongue}              Lab / Radiology Results:     Lab Results   Component Value Date    WBC 6.3 11/16/2023    RBC 4.78 11/16/2023    HGB 14.8 11/16/2023    HCT 43.7 11/16/2023    MCV 91 11/16/2023    RDW 12.9 11/16/2023     11/16/2023             Anesthetic risk and/or ASA classification:   ASA 1    Rosa Maria Ruiz MD

## 2024-03-07 LAB
PATH REPORT.COMMENTS IMP SPEC: NORMAL
PATH REPORT.COMMENTS IMP SPEC: NORMAL
PATH REPORT.FINAL DX SPEC: NORMAL
PATH REPORT.GROSS SPEC: NORMAL
PATH REPORT.MICROSCOPIC SPEC OTHER STN: NORMAL
PATH REPORT.RELEVANT HX SPEC: NORMAL
PHOTO IMAGE: NORMAL

## 2024-03-07 NOTE — TELEPHONE ENCOUNTER
I spoke with Maile and confirmed they receive the PA approvals. The representative asked to have the patient call in to initiate the process and move forward with therapy.     Thank you,    Jasmyn Garcia [facility]  Specialty Pharmacy Clinic Liaison - CardiologyNeurologyMultiple Sclerosis  [facility] [address]  [address]rd [place] [address]  Ph: [phone] Fax: [phone]  [email or web address]

## 2024-03-11 RX ORDER — SIPONIMOD 1 MG/1
1 TABLET, FILM COATED ORAL DAILY
Qty: 30 TABLET | Refills: 11 | Status: SHIPPED | OUTPATIENT
Start: 2024-03-11

## 2024-03-11 NOTE — TELEPHONE ENCOUNTER
Pt has received the initial month of medication through the [facility] support program. Rx for maintenance dose entered per [place] refill protocol, to go to [facility]. Original prescription signed by Dr Harris on start form 2/22/2024.    Flower Barnett RN

## 2024-03-11 NOTE — TELEPHONE ENCOUNTER
COPAY CARD OBTAINED    Medication: MAYZENT STARTER PACK 12 X 0.25 MG PO TBPK    Expected Copay: $ 2,417.8  Copay card Monthly Max Amount: $ 1,500  Copay card Annual Amount: $ 18,000

## 2024-03-12 ENCOUNTER — ANCILLARY PROCEDURE (OUTPATIENT)
Dept: CT IMAGING | Facility: CLINIC | Age: 59
End: 2024-03-12
Attending: NURSE PRACTITIONER
Payer: COMMERCIAL

## 2024-03-12 ENCOUNTER — ANCILLARY PROCEDURE (OUTPATIENT)
Dept: ULTRASOUND IMAGING | Facility: CLINIC | Age: 59
End: 2024-03-12
Attending: NURSE PRACTITIONER
Payer: COMMERCIAL

## 2024-03-12 DIAGNOSIS — R93.89 THICKENED ENDOMETRIUM: ICD-10-CM

## 2024-03-12 DIAGNOSIS — R59.1 LYMPHADENOPATHY: ICD-10-CM

## 2024-03-12 PROCEDURE — 76830 TRANSVAGINAL US NON-OB: CPT | Mod: GC | Performed by: STUDENT IN AN ORGANIZED HEALTH CARE EDUCATION/TRAINING PROGRAM

## 2024-03-12 PROCEDURE — 76856 US EXAM PELVIC COMPLETE: CPT | Mod: GC | Performed by: STUDENT IN AN ORGANIZED HEALTH CARE EDUCATION/TRAINING PROGRAM

## 2024-03-12 PROCEDURE — 71260 CT THORAX DX C+: CPT | Mod: GC | Performed by: RADIOLOGY

## 2024-03-12 RX ORDER — IOPAMIDOL 755 MG/ML
94 INJECTION, SOLUTION INTRAVASCULAR ONCE
Status: COMPLETED | OUTPATIENT
Start: 2024-03-12 | End: 2024-03-12

## 2024-03-12 RX ADMIN — IOPAMIDOL 94 ML: 755 INJECTION, SOLUTION INTRAVASCULAR at 08:32

## 2024-03-12 NOTE — DISCHARGE INSTRUCTIONS

## 2024-03-15 ENCOUNTER — MYC MEDICAL ADVICE (OUTPATIENT)
Dept: FAMILY MEDICINE | Facility: CLINIC | Age: 59
End: 2024-03-15
Payer: COMMERCIAL

## 2024-03-15 DIAGNOSIS — R59.0 THORACIC LYMPHADENOPATHY: Primary | ICD-10-CM

## 2024-03-15 DIAGNOSIS — R91.8 PULMONARY NODULES: ICD-10-CM

## 2024-03-15 DIAGNOSIS — R59.0 ABDOMINAL LYMPHADENOPATHY: ICD-10-CM

## 2024-03-15 DIAGNOSIS — Z87.440 PERSONAL HISTORY OF URINARY TRACT INFECTION: ICD-10-CM

## 2024-03-20 ENCOUNTER — PATIENT OUTREACH (OUTPATIENT)
Dept: ONCOLOGY | Facility: CLINIC | Age: 59
End: 2024-03-20
Payer: COMMERCIAL

## 2024-03-20 DIAGNOSIS — R91.8 PULMONARY NODULES: Primary | ICD-10-CM

## 2024-03-20 PROBLEM — R59.0 THORACIC LYMPHADENOPATHY: Status: ACTIVE | Noted: 2024-03-20

## 2024-03-20 PROBLEM — R59.0 ABDOMINAL LYMPHADENOPATHY: Status: ACTIVE | Noted: 2024-03-20

## 2024-03-20 NOTE — TELEPHONE ENCOUNTER
Called and spoke with patient to let her know she was on schedule for 1:40 with Margarita tomorrow.    I gave her the phone number to call and schedule the pulmonary referral.  She will get labs done at tomorrows appointment.    Patient stated understanding of all information.    Christine M Klisch, RN

## 2024-03-20 NOTE — PROGRESS NOTES
New IP (Interventional Pulmonology) referral rec'd.  Chart reviewed.         New Patient: Interventional Pulmonary (Lung nodule) Nurse Navigator Note    Referring provider: [facility]    Referred to (specialty): Interventional Pulmonary (Lung nodule)    Requested provider (if applicable): n/a    Date Referral Received: 3/20/2024    Evaluation for :  Lung nodule-follow up of pulmonary nodules, lymphadenopathy extensive without known cause needs evaluation    Clinical History (per Nurse review of records provided):    **BOOK MARKED**  EXAMINATION: Chest CT  3/12/2024 8:58 AM     CLINICAL HISTORY: To evaluate extent of thoracic lymphadenopathy;  Lymphadenopathy     COMPARISON: CT abdomen and pelvis 2/27/2024     TECHNIQUE: CT imaging obtained through the chest with contrast. Axial,  coronal, and sagittal reconstructions and axial MIP reformatted images  are reviewed.      FINDINGS:     Vessels: Normal caliber of the thoracic aorta.     Lower neck and axillae: No nodule in the included portion of the  thyroid. No axillary lymphadenopathy.     Mediastinum and kelby: Normal heart size.  Mediastinal and hilar  lymphadenopathy, for example, a 1.3 x 1.1 cm precarinal lymph node and  a right hilar lymph node measuring about 1.5 x 1.7 cm. Left hilar  fullness.     Airways: The central tracheobronchial tree is clear.     Pleura: No pleural effusion or pneumothorax.     Lungs: 4 mm right middle lobe pulmonary nodule (3/133). Other sub-3 mm  nodules incidentally noted. 6 mm left upper lobe solid nodule  immediately adjacent to a pulmonary vessel (3/112).. Left hilar node  versus left upper lobe pulmonary nodule immediately adjacent to the  proximal descending aorta (3/77) measuring 7 mm. Scattered  atelectasis. Motion artifact partially obscures the lung bases.     Upper abdomen: No acute process in the included upper abdomen. Hepatic  cysts and a subcentimeter  hypodensity which is too small to  characterize.     Soft tissues: Circumscribed right breast mass with coarse  calcifications, likely a benign involuting fibroadenoma.     Bones: No acute or aggressive osseous abnormality.                                                                      IMPRESSION:  Thoracic lymphadenopathy, including extensive hilar lymphadenopathy  and enlarged or prominent mediastinal, lower neck, and supraclavicular  lymph nodes. Findings remain concerning for malignancy without a  convincing primary site. Systemic inflammatory or infectious process  is also on the differential. Scattered pulmonary nodules with the  largest measuring 7 mm in the left upper lobe. Follow-up chest CT  within 3-6 months should be strongly considered     I have personally reviewed the examination and initial interpretation  and I agree with the findings.     CAITLYN CABRERA MD     Records Location: [facility]     Records Needed: none    Additional testing needed prior to consult: PFT's

## 2024-03-20 NOTE — TELEPHONE ENCOUNTER
Can you call patient to confirm and schedule a follow up appointment with me tomorrow check-in 1:40 pm.  And let her know I put in lab orders which she can do tomorrow when she comes in.  I also put in pulmonary referral for her to follow up with regarding the lung nodules and lymphadenopathy - she can call [phone] to schedule, otherwise they should call before end of week to arrange.    Margarita Suazo, DNP, APRN, CNP

## 2024-03-21 ENCOUNTER — OFFICE VISIT (OUTPATIENT)
Dept: FAMILY MEDICINE | Facility: CLINIC | Age: 59
End: 2024-03-21
Payer: COMMERCIAL

## 2024-03-21 VITALS
WEIGHT: 196.2 LBS | DIASTOLIC BLOOD PRESSURE: 80 MMHG | OXYGEN SATURATION: 97 % | BODY MASS INDEX: 32.69 KG/M2 | HEIGHT: 65 IN | SYSTOLIC BLOOD PRESSURE: 124 MMHG | TEMPERATURE: 100.6 F | RESPIRATION RATE: 22 BRPM | HEART RATE: 93 BPM

## 2024-03-21 DIAGNOSIS — R59.0 ABDOMINAL LYMPHADENOPATHY: ICD-10-CM

## 2024-03-21 DIAGNOSIS — Z12.31 VISIT FOR SCREENING MAMMOGRAM: ICD-10-CM

## 2024-03-21 DIAGNOSIS — R91.8 PULMONARY NODULES: ICD-10-CM

## 2024-03-21 DIAGNOSIS — R59.0 THORACIC LYMPHADENOPATHY: Primary | ICD-10-CM

## 2024-03-21 DIAGNOSIS — Z87.440 PERSONAL HISTORY OF URINARY TRACT INFECTION: ICD-10-CM

## 2024-03-21 LAB
ALBUMIN UR-MCNC: NEGATIVE MG/DL
APPEARANCE UR: CLEAR
BACTERIA #/AREA URNS HPF: ABNORMAL /HPF
BASOPHILS # BLD AUTO: 0 10E3/UL (ref 0–0.2)
BASOPHILS NFR BLD AUTO: 0 %
BILIRUB UR QL STRIP: NEGATIVE
COLOR UR AUTO: YELLOW
EOSINOPHIL # BLD AUTO: 0.1 10E3/UL (ref 0–0.7)
EOSINOPHIL NFR BLD AUTO: 2 %
ERYTHROCYTE [DISTWIDTH] IN BLOOD BY AUTOMATED COUNT: 12.8 % (ref 10–15)
ERYTHROCYTE [SEDIMENTATION RATE] IN BLOOD BY WESTERGREN METHOD: 7 MM/HR (ref 0–30)
GLUCOSE UR STRIP-MCNC: NEGATIVE MG/DL
HCT VFR BLD AUTO: 45.2 % (ref 35–47)
HGB BLD-MCNC: 15 G/DL (ref 11.7–15.7)
HGB UR QL STRIP: NEGATIVE
IMM GRANULOCYTES # BLD: 0 10E3/UL
IMM GRANULOCYTES NFR BLD: 0 %
KETONES UR STRIP-MCNC: NEGATIVE MG/DL
LEUKOCYTE ESTERASE UR QL STRIP: ABNORMAL
LYMPHOCYTES # BLD AUTO: 0.4 10E3/UL (ref 0.8–5.3)
LYMPHOCYTES NFR BLD AUTO: 6 %
MCH RBC QN AUTO: 30.5 PG (ref 26.5–33)
MCHC RBC AUTO-ENTMCNC: 33.2 G/DL (ref 31.5–36.5)
MCV RBC AUTO: 92 FL (ref 78–100)
MONOCYTES # BLD AUTO: 0.6 10E3/UL (ref 0–1.3)
MONOCYTES NFR BLD AUTO: 8 %
NEUTROPHILS # BLD AUTO: 5.8 10E3/UL (ref 1.6–8.3)
NEUTROPHILS NFR BLD AUTO: 84 %
NITRATE UR QL: NEGATIVE
PH UR STRIP: 5.5 [PH] (ref 5–7)
PLATELET # BLD AUTO: 306 10E3/UL (ref 150–450)
RBC # BLD AUTO: 4.91 10E6/UL (ref 3.8–5.2)
RBC #/AREA URNS AUTO: ABNORMAL /HPF
SP GR UR STRIP: 1.01 (ref 1–1.03)
SQUAMOUS #/AREA URNS AUTO: ABNORMAL /LPF
UROBILINOGEN UR STRIP-ACNC: 0.2 E.U./DL
WBC # BLD AUTO: 6.9 10E3/UL (ref 4–11)
WBC #/AREA URNS AUTO: ABNORMAL /HPF

## 2024-03-21 PROCEDURE — 87086 URINE CULTURE/COLONY COUNT: CPT | Performed by: NURSE PRACTITIONER

## 2024-03-21 PROCEDURE — 99214 OFFICE O/P EST MOD 30 MIN: CPT | Performed by: NURSE PRACTITIONER

## 2024-03-21 PROCEDURE — 80053 COMPREHEN METABOLIC PANEL: CPT | Performed by: NURSE PRACTITIONER

## 2024-03-21 PROCEDURE — 81001 URINALYSIS AUTO W/SCOPE: CPT | Performed by: NURSE PRACTITIONER

## 2024-03-21 PROCEDURE — 36415 COLL VENOUS BLD VENIPUNCTURE: CPT | Performed by: NURSE PRACTITIONER

## 2024-03-21 PROCEDURE — 85025 COMPLETE CBC W/AUTO DIFF WBC: CPT | Performed by: NURSE PRACTITIONER

## 2024-03-21 PROCEDURE — 86140 C-REACTIVE PROTEIN: CPT | Performed by: NURSE PRACTITIONER

## 2024-03-21 PROCEDURE — 85652 RBC SED RATE AUTOMATED: CPT | Performed by: NURSE PRACTITIONER

## 2024-03-21 ASSESSMENT — PAIN SCALES - GENERAL: PAINLEVEL: NO PAIN (0)

## 2024-03-21 NOTE — PROGRESS NOTES
"  Assessment & Plan     Thoracic lymphadenopathy    - CBC with platelets and differential  - ESR: Erythrocyte sedimentation rate  - CRP, inflammation  - Comprehensive metabolic panel (BMP + Alb, Alk Phos, ALT, AST, Total. Bili, TP)    Abdominal lymphadenopathy    - CBC with platelets and differential  - ESR: Erythrocyte sedimentation rate  - CRP, inflammation  - Comprehensive metabolic panel (BMP + Alb, Alk Phos, ALT, AST, Total. Bili, TP)    Pulmonary nodules    - CBC with platelets and differential  - ESR: Erythrocyte sedimentation rate  - CRP, inflammation  - Comprehensive metabolic panel (BMP + Alb, Alk Phos, ALT, AST, Total. Bili, TP)    Visit for screening mammogram    - MA Screen Bilateral w/Fawad; Future    Personal history of urinary tract infection    - Urine Culture Aerobic Bacterial - lab collect  - UA with Microscopic reflex to Culture - lab collect  - UA Microscopic with Reflex to Culture    She has thoracic and abdominal lymphadenopathy of unknown cause as well as pulmonary nodules.  She will follow up with Pulmonary 4/4/24 for the pulmonary nodules and lymphadenopathy.  She will get additional labs today.  She is overall feeling well with generalized fatigue.            BMI  Estimated body mass index is 32.75 kg/m  as calculated from the following:    Height as of this encounter: 1.648 m (5' 4.9\").    Weight as of this encounter: 89 kg (196 lb 3.2 oz).           Alyssa Jhaveri is a 58 year old, presenting for the following health issues:  Results (Form 3/13/2024)        3/21/2024     1:44 PM   Additional Questions   Roomed by Jeanine LIVINGSTON     History of Present Illness       Reason for visit:  Follow up    She eats 2-3 servings of fruits and vegetables daily.She consumes 0 sweetened beverage(s) daily.She exercises with enough effort to increase her heart rate 9 or less minutes per day.  She exercises with enough effort to increase her heart rate 3 or less days per week.   She is taking medications " "regularly.    Radilogy from 3/12/2024.     Additional provider notes:    She is here with her  today.    Urine culture was negative.    Feeling angry about not knowing why she is having lymphadenopathy.  No cough, not winded, not wheezing.  No unexplained weight loss.  She is not bothered by pain.  She is feeling tired but attributes the tired feeling to her MS.  Always has fatigue.    Working in an elementary school.              Objective    /80 (BP Location: Right arm, Patient Position: Sitting, Cuff Size: Adult Large)   Pulse 93   Temp (!) 100.6  F (38.1  C) (Oral)   Resp 22   Ht 1.648 m (5' 4.9\")   Wt 89 kg (196 lb 3.2 oz)   SpO2 97%   BMI 32.75 kg/m    Body mass index is 32.75 kg/m .  Physical Exam   GENERAL: healthy, alert, no distress, and obese  EYES: Eyes grossly normal to inspection, PERRL and conjunctivae and sclerae normal  HENT: ear canals and TM's normal, nose and mouth without ulcers or lesions  NECK: no adenopathy, no asymmetry, masses, or scars  RESP: lungs clear to auscultation - no rales, rhonchi or wheezes  CV: regular rates and rhythm, normal S1 S2, no S3 or S4, and no murmur, click or rub  PSYCH: mentation appears normal, affect flat, judgement and insight intact, and appearance well groomed    Results for orders placed or performed in visit on 03/21/24   ESR: Erythrocyte sedimentation rate     Status: Normal   Result Value Ref Range    Erythrocyte Sedimentation Rate 7 0 - 30 mm/hr   CRP, inflammation     Status: Normal   Result Value Ref Range    CRP Inflammation <3.00 <5.00 mg/L   UA with Microscopic reflex to Culture - lab collect     Status: Abnormal    Specimen: Urine, Clean Catch   Result Value Ref Range    Color Urine Yellow Colorless, Straw, Light Yellow, Yellow    Appearance Urine Clear Clear    Glucose Urine Negative Negative mg/dL    Bilirubin Urine Negative Negative    Ketones Urine Negative Negative mg/dL    Specific Gravity Urine 1.015 1.003 - 1.035    Blood " Urine Negative Negative    pH Urine 5.5 5.0 - 7.0    Protein Albumin Urine Negative Negative mg/dL    Urobilinogen Urine 0.2 0.2, 1.0 E.U./dL    Nitrite Urine Negative Negative    Leukocyte Esterase Urine Trace (A) Negative   CBC with platelets and differential     Status: Abnormal   Result Value Ref Range    WBC Count 6.9 4.0 - 11.0 10e3/uL    RBC Count 4.91 3.80 - 5.20 10e6/uL    Hemoglobin 15.0 11.7 - 15.7 g/dL    Hematocrit 45.2 35.0 - 47.0 %    MCV 92 78 - 100 fL    MCH 30.5 26.5 - 33.0 pg    MCHC 33.2 31.5 - 36.5 g/dL    RDW 12.8 10.0 - 15.0 %    Platelet Count 306 150 - 450 10e3/uL    % Neutrophils 84 %    % Lymphocytes 6 %    % Monocytes 8 %    % Eosinophils 2 %    % Basophils 0 %    % Immature Granulocytes 0 %    Absolute Neutrophils 5.8 1.6 - 8.3 10e3/uL    Absolute Lymphocytes 0.4 (L) 0.8 - 5.3 10e3/uL    Absolute Monocytes 0.6 0.0 - 1.3 10e3/uL    Absolute Eosinophils 0.1 0.0 - 0.7 10e3/uL    Absolute Basophils 0.0 0.0 - 0.2 10e3/uL    Absolute Immature Granulocytes 0.0 <=0.4 10e3/uL   UA Microscopic with Reflex to Culture     Status: Abnormal   Result Value Ref Range    Bacteria Urine Few (A) None Seen /HPF    RBC Urine 0-2 0-2 /HPF /HPF    WBC Urine 0-5 0-5 /HPF /HPF    Squamous Epithelials Urine Few (A) None Seen /LPF    Narrative    Urine Culture not indicated   Comprehensive metabolic panel (BMP + Alb, Alk Phos, ALT, AST, Total. Bili, TP)     Status: Normal   Result Value Ref Range    Sodium 141 135 - 145 mmol/L    Potassium 4.7 3.4 - 5.3 mmol/L    Carbon Dioxide (CO2) 26 22 - 29 mmol/L    Anion Gap 11 7 - 15 mmol/L    Urea Nitrogen 15.6 6.0 - 20.0 mg/dL    Creatinine 0.76 0.51 - 0.95 mg/dL    GFR Estimate >90 >60 mL/min/1.73m2    Calcium 9.7 8.6 - 10.0 mg/dL    Chloride 104 98 - 107 mmol/L    Glucose 95 70 - 99 mg/dL    Alkaline Phosphatase 87 40 - 150 U/L    AST 25 0 - 45 U/L    ALT 39 0 - 50 U/L    Protein Total 7.1 6.4 - 8.3 g/dL    Albumin 4.5 3.5 - 5.2 g/dL    Bilirubin Total 0.4 <=1.2 mg/dL    Urine Culture Aerobic Bacterial - lab collect     Status: None    Specimen: Urine, Clean Catch   Result Value Ref Range    Culture 10,000-50,000 CFU/mL Mixture of Urogenital Joann    CBC with platelets and differential     Status: Abnormal    Narrative    The following orders were created for panel order CBC with platelets and differential.  Procedure                               Abnormality         Status                     ---------                               -----------         ------                     CBC with platelets and d...[[id number]]  Abnormal            Final result                 Please view results for these tests on the individual orders.         EXAMINATION: CT ABDOMEN PELVIS W CONTRAST, 2/27/2024 7:11 AM     INDICATION: Follow up for enlarged retroperitoneal lymph nodes on  prior CT of abdomen and pelvis at [facility]; Abnormal computed  tomography of abdomen and pelvis     COMPARISON STUDY: No relevant priors available on PACS. Report is also  not available on the electronic medical record for comparison.     TECHNIQUE: CT scan of the abdomen and pelvis was performed on  multidetector CT scanner using volumetric acquisition technique and  images were reconstructed in multiple planes with variable thickness  and reviewed on dedicated workstations.      CONTRAST: Isovue 370 94cc injected IV without oral contrast     CT scan radiation dose is optimized to minimum requisite dose using  automated dose modulation techniques.     FINDINGS:     Lower thorax: Multiple centrally hypoattenuating hilar lymph nodes are  visualized in the bilateral inferior kelby measuring approximately 9-10  mm in diameter (series 3 images 1, 7, and 20) partially visualized.  Basilar areas of intralobular septal thickening. Otherwise  unremarkable lower chest.     Liver: There are two large hepatic cysts in segment 4A and B. Multiple  scattered additional hepatic hypoattenuating observations are too  small to fully  characterize and likely additional hepatic cysts. No  intrahepatic biliary ductal dilation.     Biliary System: Cholelithiasis without cholecystitis.     Pancreas: No mass or pancreatic ductal dilation.     Adrenal glands: No mass or nodules     Spleen: Normal.     Kidneys: No suspicious mass, obstructing calculus or hydronephrosis.     Gastrointestinal tract :Normal appendix. Normal caliber small bowel.   Focal area of luminal thickening with questionable wall thickening in  the upper rectum (series 3 image 330).     Mesentery/peritoneum/retroperitoneum: No mass. No free fluid or air.     Lymph nodes: Upper abdominal lymphadenopathy with 11 and 9 mm in short  axis portacaval lymph nodes (series 3 images 104 and 118), para-aortic  lymph nodes measuring 13, 13, and 11 mm in short axis (series 3 images  141, 152, 170). There are prominent lymph nodes adjacent to the  bilateral gonadal veins, although not enlarged by size criteria.  Prominent bilateral external iliac chain lymph nodes.     Vasculature: Patent major abdominal vasculature.     Pelvis: Thickening of the urinary bladder with mildly enhancing  mucosa.  Somewhat thickened endometrium, nonspecific. No definite  adnexal lesions being.     Osseous structures: No aggressive or acute osseous lesion.  Dense  osseous lesion in the left iliac crest with attenuation of both the  pelvis and Hounsfield units, likely a bone island (series 3 image  305). Tiny lucent foci in the bilateral iliac bones are nonspecific  and usually related to heterogeneous fatty bone marrow. Grade 1  anterolisthesis of L5-S1 with bilateral pars defects.      Soft tissues: Within normal limits.                                                                      IMPRESSION:   1. Thoracic and abdominal lymphadenopathy as described above. Outside  imaging mentioned in the indication is not available for direct  comparison. Although this can be seen in systemic inflammatory and  infectious  etiologies, this is primarily concerning for malignancy  without convincing detection of a primary site.  1a. Recommend dedicated chest CT with contrast to evaluate extent of  thoracic lymphadenopathy.  1b. Given the distribution of lymphadenopathy and somewhat thickened  endometrium in a presumed post menopausal female, gynecological  malignancy should be excluded. Consider further evaluation with  dedicated pelvic ultrasound and correlation with any abnormal  postmenopausal bleeding.  2. Focal area of thickening in the rectum, with nonspecific  appearance. Although this could be an area of peristalsis, a small  mass cannot be ruled out. Consider direct visualization with  rectosigmoidoscopy/colonoscopy.  3. Thickening of the urinary bladder with enhancing mucosa,  nonspecific and can be seen in setting of cystitis. Recommend  correlation with clinical presentation and urinalysis.  4. Cholelithiasis without evidence of acute cholecystitis.     I have personally reviewed the examination and initial interpretation  and I agree with the findings.     TALON JENSEN MD       EXAMINATION: Chest CT  3/12/2024 8:58 AM     CLINICAL HISTORY: To evaluate extent of thoracic lymphadenopathy;  Lymphadenopathy     COMPARISON: CT abdomen and pelvis 2/27/2024     TECHNIQUE: CT imaging obtained through the chest with contrast. Axial,  coronal, and sagittal reconstructions and axial MIP reformatted images  are reviewed.      FINDINGS:     Vessels: Normal caliber of the thoracic aorta.     Lower neck and axillae: No nodule in the included portion of the  thyroid. No axillary lymphadenopathy.     Mediastinum and kelby: Normal heart size.  Mediastinal and hilar  lymphadenopathy, for example, a 1.3 x 1.1 cm precarinal lymph node and  a right hilar lymph node measuring about 1.5 x 1.7 cm. Left hilar  fullness.     Airways: The central tracheobronchial tree is clear.     Pleura: No pleural effusion or pneumothorax.     Lungs: 4 mm  right middle lobe pulmonary nodule (3/133). Other sub-3 mm  nodules incidentally noted. 6 mm left upper lobe solid nodule  immediately adjacent to a pulmonary vessel (3/112).. Left hilar node  versus left upper lobe pulmonary nodule immediately adjacent to the  proximal descending aorta (3/77) measuring 7 mm. Scattered  atelectasis. Motion artifact partially obscures the lung bases.     Upper abdomen: No acute process in the included upper abdomen. Hepatic  cysts and a subcentimeter hypodensity which is too small to  characterize.     Soft tissues: Circumscribed right breast mass with coarse  calcifications, likely a benign involuting fibroadenoma.     Bones: No acute or aggressive osseous abnormality.                                                                      IMPRESSION:  Thoracic lymphadenopathy, including extensive hilar lymphadenopathy  and enlarged or prominent mediastinal, lower neck, and supraclavicular  lymph nodes. Findings remain concerning for malignancy without a  convincing primary site. Systemic inflammatory or infectious process  is also on the differential. Scattered pulmonary nodules with the  largest measuring 7 mm in the left upper lobe. Follow-up chest CT  within 3-6 months should be strongly considered     I have personally reviewed the examination and initial interpretation  and I agree with the findings.     CAITLYN CABRERA MD       EXAMINATION: US PELVIC TRANSABDOMINAL AND TRANSVAGINAL, 3/12/2024 9:16  AM      COMPARISON: CT abdomen pelvis 2/27/2024     HISTORY: Follow-up to prior CT showing thickened endometrium and  lymphadenopathy; Thickened endometrium     TECHNIQUE: The pelvis was scanned in standard fashion with  transabdominal and transvaginal transducer(s) using both grey scale  and spectral flow and  color Doppler techniques.     FINDINGS:  The uterus measures 7.4 x 5.0 x 3.8 cm, and there is no evidence of a  focal fibroid.  The endometrium is mildly heterogeneous and  measures 4  mm. There is no free fluid in the pelvis. Trace free fluid within the  endometrial canal.     The right ovary measures 2.2 x 2.1 x 1.4 cm and the left ovary  measures 2.1 x 2. x 1.5 cm. There is no adnexal mass. There is normal  blood flow to the ovaries.     Small amount of echogenic debris within the urinary bladder without  significant wall thickening.                                                                      IMPRESSION:   Normal sonographic appearance of the uterus and ovaries. Endometrial  stripe is within normal limits, measuring 4 mm.      I have personally reviewed the examination and initial interpretation  and I agree with the findings.     MARY JO NUÑEZ DO     Signed Electronically by: Margarita Suazo NP

## 2024-03-22 LAB
ALBUMIN SERPL BCG-MCNC: 4.5 G/DL (ref 3.5–5.2)
ALP SERPL-CCNC: 87 U/L (ref 40–150)
ALT SERPL W P-5'-P-CCNC: 39 U/L (ref 0–50)
ANION GAP SERPL CALCULATED.3IONS-SCNC: 11 MMOL/L (ref 7–15)
AST SERPL W P-5'-P-CCNC: 25 U/L (ref 0–45)
BACTERIA UR CULT: NORMAL
BILIRUB SERPL-MCNC: 0.4 MG/DL
BUN SERPL-MCNC: 15.6 MG/DL (ref 6–20)
CALCIUM SERPL-MCNC: 9.7 MG/DL (ref 8.6–10)
CHLORIDE SERPL-SCNC: 104 MMOL/L (ref 98–107)
CREAT SERPL-MCNC: 0.76 MG/DL (ref 0.51–0.95)
CRP SERPL-MCNC: <3 MG/L
DEPRECATED HCO3 PLAS-SCNC: 26 MMOL/L (ref 22–29)
EGFRCR SERPLBLD CKD-EPI 2021: >90 ML/MIN/1.73M2
GLUCOSE SERPL-MCNC: 95 MG/DL (ref 70–99)
POTASSIUM SERPL-SCNC: 4.7 MMOL/L (ref 3.4–5.3)
PROT SERPL-MCNC: 7.1 G/DL (ref 6.4–8.3)
SODIUM SERPL-SCNC: 141 MMOL/L (ref 135–145)

## 2024-03-29 NOTE — TELEPHONE ENCOUNTER
RECORDS STATUS - ALL OTHER DIAGNOSIS      RECORDS RECEIVED FROM: [facility] - Internal records   DATE RECEIVED: 3/29

## 2024-04-01 NOTE — PROGRESS NOTES
LUNG NODULE & INTERVENTIONAL PULMONARY CLINIC  [facility], [facility]     Shakila Kapoor MRN# [id number]   Age: 58 year old YOB: 1965     Reason for Consultation: Lymphadenopathy    Requesting Physician: Margarita Suazo, JOSE MIGUEL  [address] [place] [address]       Assessment and Plan:    1. Mediastinal and hilar lymphadenopathy  Mildly enlarged with R hilar measuring 1.6cm and paratracheal lymph node measuring 1.2cm. Differential includes inflammatory causes such as sarcoidosis and malignancy.   --Plan for EBUS of mediastinal and hilar lymphadenopathy and BAL for sarcoid rule out, please send flow cytometry and cultures from FNA samples in addition to cytology.      2. 7mm DARIN nodule  --Repeat CT Chest in 3-6 months, timing to be determined after biopsy results .    Korin Williamson MD  Interventional Pulmonology  Department of Pulmonary, Allergy, Critical Care and Sleep Medicine   [facility]           History:     Shakila Kapoor is a 58 year old female with sig h/o for MS and right nerve sheath meningioma s/p external beam radiation 2019 who is here for lymphadenopathy.    Had CT abdomen done 2/2024 to followup CT abdomen in the fall and was found to have abdominal lymphadenopathy measuring up to 13mm. Mildly enlarged lymph nodes were also found on CT Chest and she was referred here for further evaluation.     Her symptoms of MS include impaired vision on the R and gait instability. On treatment for last 4 years with stable symptoms.      No winter illnesses. Weight stable, no fevers, chills, or night sweats. Had UTI recently with 3 months of antibiotics. The UTI had prompted the initial CT abdomen.     - Denies any difficulty walking on flat ground or going up a flight of stairs.     - Personal hx of cancer: Benign meningioma of right nerve sheath  - Family hx of cancer: Grandmothers with lung cancer (smokers), no history of lymphoma or sarcoidosis  "  - Exposure hx: No concerns for dust or mold exposure, works as a special .   - Tobacco hx: Never smoker  - My interpretation of the images relevant for this visit includes: Thoracic and abdominal lymphadenopathy   - My interpretation of the PFT's relevant for this visit includes: Mild obstruction, normal lung volumes and diffusion capacity.          Allergies:      Allergies   Allergen Reactions    Penicillins           Medications:     Current Outpatient Medications   Medication Sig    cephALEXin (KEFLEX) 250 MG capsule Take 250 mg by mouth daily Prescribed by [facility] for recurrent UTIs    cholecalciferol (VITAMIN D3) 125 mcg (5000 units) capsule Take 1 capsule (125 mcg) by mouth daily    estradiol (VAGIFEM) 10 MCG TABS vaginal tablet Place 1 tablet (10 mcg) vaginally twice a week Place 1 tablet (10 mcg) vaginally daily for 14 days, THEN 1 tablet (10 mcg) twice a week for 76 days.    lisinopril (ZESTRIL) 10 MG tablet Take 1 tablet (10 mg) by mouth daily    LORazepam (ATIVAN) 0.5 MG tablet Take 0.5-1 mg by mouth daily as needed    MAYZENT 1 MG TABS Take 1 mg by mouth daily    PARoxetine (PAXIL) 30 MG tablet Take 30 mg by mouth every evening    ZEPOSIA 0.92 MG CAPS TAKE ONE CAPSULE BY MOUTH ONCE DAILY     No current facility-administered medications for this visit.            Physical Exam:   BP (!) 147/95 (BP Location: Right arm, Patient Position: Sitting, Cuff Size: Adult Regular)   Pulse 119   Temp 98.6  F (37  C) (Oral)   Resp 16   Ht 1.66 m (5' 5.35\")   Wt 87.5 kg (192 lb 12.8 oz)   SpO2 96%   BMI 31.74 kg/m    Wt Readings from Last 4 Encounters:   04/04/24 87.5 kg (192 lb 12.8 oz)   03/21/24 89 kg (196 lb 3.2 oz)   01/11/24 87 kg (191 lb 12.8 oz)   12/08/23 84.4 kg (186 lb)     General: Well appearing  Lungs: Nonlabored breathing  Neuro: Answering questions appropriately  Psych: Normal affect     Imaging/Lab Data   All laboratory and imaging data reviewed.           "

## 2024-04-04 ENCOUNTER — PRE VISIT (OUTPATIENT)
Dept: PULMONOLOGY | Facility: CLINIC | Age: 59
End: 2024-04-04

## 2024-04-04 ENCOUNTER — ONCOLOGY VISIT (OUTPATIENT)
Dept: PULMONOLOGY | Facility: CLINIC | Age: 59
End: 2024-04-04
Attending: NURSE PRACTITIONER
Payer: COMMERCIAL

## 2024-04-04 ENCOUNTER — OFFICE VISIT (OUTPATIENT)
Dept: PULMONOLOGY | Facility: CLINIC | Age: 59
End: 2024-04-04
Payer: COMMERCIAL

## 2024-04-04 VITALS
OXYGEN SATURATION: 96 % | TEMPERATURE: 98.6 F | SYSTOLIC BLOOD PRESSURE: 147 MMHG | HEIGHT: 65 IN | RESPIRATION RATE: 16 BRPM | BODY MASS INDEX: 32.12 KG/M2 | DIASTOLIC BLOOD PRESSURE: 95 MMHG | WEIGHT: 192.8 LBS | HEART RATE: 119 BPM

## 2024-04-04 DIAGNOSIS — R59.1 LYMPHADENOPATHY: Primary | ICD-10-CM

## 2024-04-04 DIAGNOSIS — R91.8 PULMONARY NODULES: ICD-10-CM

## 2024-04-04 DIAGNOSIS — R59.0 THORACIC LYMPHADENOPATHY: ICD-10-CM

## 2024-04-04 PROCEDURE — 94060 EVALUATION OF WHEEZING: CPT | Performed by: INTERNAL MEDICINE

## 2024-04-04 PROCEDURE — 94729 DIFFUSING CAPACITY: CPT | Performed by: INTERNAL MEDICINE

## 2024-04-04 PROCEDURE — 94726 PLETHYSMOGRAPHY LUNG VOLUMES: CPT | Performed by: INTERNAL MEDICINE

## 2024-04-04 PROCEDURE — 99214 OFFICE O/P EST MOD 30 MIN: CPT | Mod: 25 | Performed by: STUDENT IN AN ORGANIZED HEALTH CARE EDUCATION/TRAINING PROGRAM

## 2024-04-04 PROCEDURE — 99213 OFFICE O/P EST LOW 20 MIN: CPT | Performed by: STUDENT IN AN ORGANIZED HEALTH CARE EDUCATION/TRAINING PROGRAM

## 2024-04-04 ASSESSMENT — PAIN SCALES - GENERAL: PAINLEVEL: NO PAIN (0)

## 2024-04-04 NOTE — NURSING NOTE
"Oncology Rooming Note    April 4, 2024 7:11 AM   Shakila Kapoor is a 58 year old female who presents for:    Chief Complaint   Patient presents with    Oncology Clinic Visit     Thoracic lymphadenopathy; Pulmonary nodules     Initial Vitals: BP (!) 147/95 (BP Location: Right arm, Patient Position: Sitting, Cuff Size: Adult Regular)   Pulse 119   Temp 98.6  F (37  C) (Oral)   Resp 16   Ht 1.66 m (5' 5.35\")   Wt 87.5 kg (192 lb 12.8 oz)   SpO2 96%   BMI 31.74 kg/m   Estimated body mass index is 31.74 kg/m  as calculated from the following:    Height as of this encounter: 1.66 m (5' 5.35\").    Weight as of this encounter: 87.5 kg (192 lb 12.8 oz). Body surface area is 2.01 meters squared.  No Pain (0) Comment: Data Unavailable   No LMP recorded. Patient is postmenopausal.  Allergies reviewed: Yes  Medications reviewed: Yes    Medications: Medication refills not needed today.  Pharmacy name entered into [employer]:    [facility] - [place], [place] [facility] [address] [facility] [place] [place] [facility] [address]  [facility] [place], [place] - [address]    Frailty Screening:   Is the patient here for a new oncology consult visit in cancer care? 1. Yes. Over the past month, have you experienced difficulty or required a caregiver to assist with:   1. Balance, walking or general mobility (including any falls)? NO  2. Completion of self-care tasks such as bathing, dressing, toileting, grooming/hygiene?  NO  3. Concentration or memory that affects your daily life?  NO       Clinical concerns: none       Liyah Mcgee              "

## 2024-04-05 LAB
DLCOCOR-%PRED-PRE: 108 %
DLCOCOR-PRE: 22.24 ML/MIN/MMHG
DLCOUNC-%PRED-PRE: 113 %
DLCOUNC-PRE: 23.26 ML/MIN/MMHG
DLCOUNC-PRED: 20.56 ML/MIN/MMHG
ERV-%PRED-PRE: 71 %
ERV-PRE: 0.83 L
ERV-PRED: 1.16 L
EXPTIME-PRE: 9.08 SEC
FEF2575-%PRED-POST: 33 %
FEF2575-%PRED-PRE: 39 %
FEF2575-POST: 0.78 L/SEC
FEF2575-PRE: 0.94 L/SEC
FEF2575-PRED: 2.35 L/SEC
FEFMAX-%PRED-PRE: 68 %
FEFMAX-PRE: 4.55 L/SEC
FEFMAX-PRED: 6.63 L/SEC
FEV1-%PRED-PRE: 70 %
FEV1-PRE: 1.77 L
FEV1FEV6-PRE: 64 %
FEV1FEV6-PRED: 81 %
FEV1FVC-PRE: 63 %
FEV1FVC-PRED: 80 %
FEV1SVC-PRE: 62 %
FEV1SVC-PRED: 74 %
FIFMAX-PRE: 3.87 L/SEC
FRCPLETH-%PRED-PRE: 103 %
FRCPLETH-PRE: 2.85 L
FRCPLETH-PRED: 2.75 L
FVC-%PRED-PRE: 90 %
FVC-PRE: 2.83 L
FVC-PRED: 3.12 L
IC-%PRED-PRE: 87 %
IC-PRE: 2.03 L
IC-PRED: 2.32 L
RVPLETH-%PRED-PRE: 106 %
RVPLETH-PRE: 2.02 L
RVPLETH-PRED: 1.89 L
TLCPLETH-%PRED-PRE: 95 %
TLCPLETH-PRE: 4.88 L
TLCPLETH-PRED: 5.11 L
VA-%PRED-PRE: 80 %
VA-PRE: 3.92 L
VC-%PRED-PRE: 84 %
VC-PRE: 2.86 L
VC-PRED: 3.4 L

## 2024-04-10 ENCOUNTER — ANCILLARY PROCEDURE (OUTPATIENT)
Dept: MAMMOGRAPHY | Facility: CLINIC | Age: 59
End: 2024-04-10
Attending: NURSE PRACTITIONER
Payer: COMMERCIAL

## 2024-04-10 DIAGNOSIS — Z12.31 VISIT FOR SCREENING MAMMOGRAM: ICD-10-CM

## 2024-04-10 PROCEDURE — 77063 BREAST TOMOSYNTHESIS BI: CPT | Mod: GC

## 2024-04-10 PROCEDURE — 77067 SCR MAMMO BI INCL CAD: CPT | Mod: GC

## 2024-04-18 ENCOUNTER — APPOINTMENT (OUTPATIENT)
Dept: RADIOLOGY | Facility: HOSPITAL | Age: 59
End: 2024-04-18
Attending: INTERNAL MEDICINE
Payer: COMMERCIAL

## 2024-04-18 ENCOUNTER — ANESTHESIA (OUTPATIENT)
Dept: SURGERY | Facility: HOSPITAL | Age: 59
End: 2024-04-18
Payer: COMMERCIAL

## 2024-04-18 ENCOUNTER — ANESTHESIA EVENT (OUTPATIENT)
Dept: SURGERY | Facility: HOSPITAL | Age: 59
End: 2024-04-18
Payer: COMMERCIAL

## 2024-04-18 ENCOUNTER — HOSPITAL ENCOUNTER (OUTPATIENT)
Facility: HOSPITAL | Age: 59
Discharge: HOME | End: 2024-04-18
Attending: INTERNAL MEDICINE | Admitting: INTERNAL MEDICINE
Payer: COMMERCIAL

## 2024-04-18 VITALS
SYSTOLIC BLOOD PRESSURE: 110 MMHG | TEMPERATURE: 98.2 F | DIASTOLIC BLOOD PRESSURE: 67 MMHG | OXYGEN SATURATION: 93 % | RESPIRATION RATE: 20 BRPM | HEART RATE: 92 BPM | BODY MASS INDEX: 31.79 KG/M2 | WEIGHT: 193.1 LBS

## 2024-04-18 LAB
% LINING CELLS, BODY FLUID: 4 %
APPEARANCE FLD: ABNORMAL
CELL COUNT BODY FLUID SOURCE: ABNORMAL
COLOR FLD: ABNORMAL
EOSINOPHIL NFR FLD MANUAL: 1 %
GRAM STAIN RESULT: NORMAL
GRAM STAIN RESULT: NORMAL
LYMPHOCYTES NFR FLD MANUAL: 13 %
MONOS+MACROS NFR FLD MANUAL: 78 %
NEUTS BAND NFR FLD MANUAL: 4 %
WBC # FLD AUTO: 68 /UL

## 2024-04-18 PROCEDURE — 88184 FLOWCYTOMETRY/ TC 1 MARKER: CPT | Performed by: INTERNAL MEDICINE

## 2024-04-18 PROCEDURE — 88173 CYTOPATH EVAL FNA REPORT: CPT | Mod: TC | Performed by: INTERNAL MEDICINE

## 2024-04-18 PROCEDURE — 710N000009 HC RECOVERY PHASE 1, LEVEL 1, PER MIN: Performed by: INTERNAL MEDICINE

## 2024-04-18 PROCEDURE — 87206 SMEAR FLUORESCENT/ACID STAI: CPT | Performed by: INTERNAL MEDICINE

## 2024-04-18 PROCEDURE — 88189 FLOWCYTOMETRY/READ 16 & >: CPT | Mod: GC | Performed by: STUDENT IN AN ORGANIZED HEALTH CARE EDUCATION/TRAINING PROGRAM

## 2024-04-18 PROCEDURE — 250N000009 HC RX 250: Performed by: INTERNAL MEDICINE

## 2024-04-18 PROCEDURE — 88185 FLOWCYTOMETRY/TC ADD-ON: CPT | Performed by: INTERNAL MEDICINE

## 2024-04-18 PROCEDURE — 710N000012 HC RECOVERY PHASE 2, PER MINUTE: Performed by: INTERNAL MEDICINE

## 2024-04-18 PROCEDURE — 89050 BODY FLUID CELL COUNT: CPT | Performed by: INTERNAL MEDICINE

## 2024-04-18 PROCEDURE — 87102 FUNGUS ISOLATION CULTURE: CPT | Performed by: INTERNAL MEDICINE

## 2024-04-18 PROCEDURE — 999N000065 XR CHEST PORT 1 VIEW

## 2024-04-18 PROCEDURE — 250N000011 HC RX IP 250 OP 636: Performed by: STUDENT IN AN ORGANIZED HEALTH CARE EDUCATION/TRAINING PROGRAM

## 2024-04-18 PROCEDURE — 88305 TISSUE EXAM BY PATHOLOGIST: CPT | Mod: TC | Performed by: INTERNAL MEDICINE

## 2024-04-18 PROCEDURE — 250N000009 HC RX 250: Performed by: STUDENT IN AN ORGANIZED HEALTH CARE EDUCATION/TRAINING PROGRAM

## 2024-04-18 PROCEDURE — 272N000001 HC OR GENERAL SUPPLY STERILE: Performed by: INTERNAL MEDICINE

## 2024-04-18 PROCEDURE — 87070 CULTURE OTHR SPECIMN AEROBIC: CPT | Performed by: INTERNAL MEDICINE

## 2024-04-18 PROCEDURE — 87205 SMEAR GRAM STAIN: CPT | Performed by: INTERNAL MEDICINE

## 2024-04-18 PROCEDURE — 999N000290 HC STATISTIC EBUS ASSIST

## 2024-04-18 PROCEDURE — 999N000157 HC STATISTIC RCP TIME EA 10 MIN

## 2024-04-18 PROCEDURE — 999N000141 HC STATISTIC PRE-PROCEDURE NURSING ASSESSMENT: Performed by: INTERNAL MEDICINE

## 2024-04-18 PROCEDURE — 360N000077 HC SURGERY LEVEL 4, PER MIN: Performed by: INTERNAL MEDICINE

## 2024-04-18 PROCEDURE — 370N000017 HC ANESTHESIA TECHNICAL FEE, PER MIN: Performed by: INTERNAL MEDICINE

## 2024-04-18 PROCEDURE — 88312 SPECIAL STAINS GROUP 1: CPT | Mod: TC | Performed by: INTERNAL MEDICINE

## 2024-04-18 PROCEDURE — 86356 MONONUCLEAR CELL ANTIGEN: CPT | Mod: XU | Performed by: INTERNAL MEDICINE

## 2024-04-18 PROCEDURE — 87116 MYCOBACTERIA CULTURE: CPT | Performed by: INTERNAL MEDICINE

## 2024-04-18 RX ORDER — HYDROMORPHONE HYDROCHLORIDE 1 MG/ML
0.2 INJECTION, SOLUTION INTRAMUSCULAR; INTRAVENOUS; SUBCUTANEOUS EVERY 5 MIN PRN
Status: DISCONTINUED | OUTPATIENT
Start: 2024-04-18 | End: 2024-04-18 | Stop reason: HOSPADM

## 2024-04-18 RX ORDER — HALOPERIDOL 5 MG/ML
1 INJECTION INTRAMUSCULAR
Status: DISCONTINUED | OUTPATIENT
Start: 2024-04-18 | End: 2024-04-18 | Stop reason: HOSPADM

## 2024-04-18 RX ORDER — SODIUM CHLORIDE, SODIUM LACTATE, POTASSIUM CHLORIDE, CALCIUM CHLORIDE 600; 310; 30; 20 MG/100ML; MG/100ML; MG/100ML; MG/100ML
INJECTION, SOLUTION INTRAVENOUS CONTINUOUS
Status: DISCONTINUED | OUTPATIENT
Start: 2024-04-18 | End: 2024-04-18 | Stop reason: HOSPADM

## 2024-04-18 RX ORDER — LIDOCAINE 40 MG/G
CREAM TOPICAL
Status: DISCONTINUED | OUTPATIENT
Start: 2024-04-18 | End: 2024-04-18 | Stop reason: HOSPADM

## 2024-04-18 RX ORDER — ACETAMINOPHEN 325 MG/1
975 TABLET ORAL ONCE
Status: COMPLETED | OUTPATIENT
Start: 2024-04-18 | End: 2024-04-18

## 2024-04-18 RX ORDER — FENTANYL CITRATE 50 UG/ML
50 INJECTION, SOLUTION INTRAMUSCULAR; INTRAVENOUS EVERY 5 MIN PRN
Status: DISCONTINUED | OUTPATIENT
Start: 2024-04-18 | End: 2024-04-18 | Stop reason: HOSPADM

## 2024-04-18 RX ORDER — ONDANSETRON 2 MG/ML
4 INJECTION INTRAMUSCULAR; INTRAVENOUS EVERY 30 MIN PRN
Status: DISCONTINUED | OUTPATIENT
Start: 2024-04-18 | End: 2024-04-18 | Stop reason: HOSPADM

## 2024-04-18 RX ORDER — HYDROMORPHONE HYDROCHLORIDE 1 MG/ML
0.4 INJECTION, SOLUTION INTRAMUSCULAR; INTRAVENOUS; SUBCUTANEOUS EVERY 5 MIN PRN
Status: DISCONTINUED | OUTPATIENT
Start: 2024-04-18 | End: 2024-04-18 | Stop reason: HOSPADM

## 2024-04-18 RX ORDER — ONDANSETRON 4 MG/1
4 TABLET, ORALLY DISINTEGRATING ORAL EVERY 30 MIN PRN
Status: DISCONTINUED | OUTPATIENT
Start: 2024-04-18 | End: 2024-04-18 | Stop reason: HOSPADM

## 2024-04-18 RX ORDER — MAGNESIUM HYDROXIDE 1200 MG/15ML
LIQUID ORAL PRN
Status: DISCONTINUED | OUTPATIENT
Start: 2024-04-18 | End: 2024-04-18 | Stop reason: HOSPADM

## 2024-04-18 RX ORDER — DEXAMETHASONE SODIUM PHOSPHATE 10 MG/ML
INJECTION, SOLUTION INTRAMUSCULAR; INTRAVENOUS PRN
Status: DISCONTINUED | OUTPATIENT
Start: 2024-04-18 | End: 2024-04-18

## 2024-04-18 RX ORDER — FENTANYL CITRATE 50 UG/ML
INJECTION, SOLUTION INTRAMUSCULAR; INTRAVENOUS PRN
Status: DISCONTINUED | OUTPATIENT
Start: 2024-04-18 | End: 2024-04-18

## 2024-04-18 RX ORDER — KETOROLAC TROMETHAMINE 30 MG/ML
15 INJECTION, SOLUTION INTRAMUSCULAR; INTRAVENOUS
Status: DISCONTINUED | OUTPATIENT
Start: 2024-04-18 | End: 2024-04-18 | Stop reason: HOSPADM

## 2024-04-18 RX ORDER — LORAZEPAM 2 MG/ML
.5-1 INJECTION INTRAMUSCULAR
Status: DISCONTINUED | OUTPATIENT
Start: 2024-04-18 | End: 2024-04-18 | Stop reason: HOSPADM

## 2024-04-18 RX ORDER — OXYCODONE HYDROCHLORIDE 5 MG/1
5 TABLET ORAL
Status: DISCONTINUED | OUTPATIENT
Start: 2024-04-18 | End: 2024-04-18 | Stop reason: HOSPADM

## 2024-04-18 RX ORDER — PROPOFOL 10 MG/ML
INJECTION, EMULSION INTRAVENOUS PRN
Status: DISCONTINUED | OUTPATIENT
Start: 2024-04-18 | End: 2024-04-18

## 2024-04-18 RX ORDER — ONDANSETRON 2 MG/ML
INJECTION INTRAMUSCULAR; INTRAVENOUS PRN
Status: DISCONTINUED | OUTPATIENT
Start: 2024-04-18 | End: 2024-04-18

## 2024-04-18 RX ORDER — NALOXONE HYDROCHLORIDE 0.4 MG/ML
0.1 INJECTION, SOLUTION INTRAMUSCULAR; INTRAVENOUS; SUBCUTANEOUS
Status: DISCONTINUED | OUTPATIENT
Start: 2024-04-18 | End: 2024-04-18 | Stop reason: HOSPADM

## 2024-04-18 RX ORDER — KETAMINE HYDROCHLORIDE 10 MG/ML
INJECTION INTRAMUSCULAR; INTRAVENOUS PRN
Status: DISCONTINUED | OUTPATIENT
Start: 2024-04-18 | End: 2024-04-18

## 2024-04-18 RX ORDER — OXYCODONE HYDROCHLORIDE 5 MG/1
10 TABLET ORAL
Status: DISCONTINUED | OUTPATIENT
Start: 2024-04-18 | End: 2024-04-18 | Stop reason: HOSPADM

## 2024-04-18 RX ORDER — FENTANYL CITRATE 50 UG/ML
25 INJECTION, SOLUTION INTRAMUSCULAR; INTRAVENOUS EVERY 5 MIN PRN
Status: DISCONTINUED | OUTPATIENT
Start: 2024-04-18 | End: 2024-04-18 | Stop reason: HOSPADM

## 2024-04-18 RX ORDER — LIDOCAINE HYDROCHLORIDE 10 MG/ML
INJECTION, SOLUTION INFILTRATION; PERINEURAL PRN
Status: DISCONTINUED | OUTPATIENT
Start: 2024-04-18 | End: 2024-04-18

## 2024-04-18 RX ORDER — PROPOFOL 10 MG/ML
INJECTION, EMULSION INTRAVENOUS CONTINUOUS PRN
Status: DISCONTINUED | OUTPATIENT
Start: 2024-04-18 | End: 2024-04-18

## 2024-04-18 RX ADMIN — KETAMINE HYDROCHLORIDE 20 MG: 10 INJECTION INTRAMUSCULAR; INTRAVENOUS at 11:13

## 2024-04-18 RX ADMIN — MIDAZOLAM 2 MG: 1 INJECTION INTRAMUSCULAR; INTRAVENOUS at 11:02

## 2024-04-18 RX ADMIN — FENTANYL CITRATE 100 MCG: 50 INJECTION INTRAMUSCULAR; INTRAVENOUS at 11:10

## 2024-04-18 RX ADMIN — PROPOFOL 20 MG: 10 INJECTION, EMULSION INTRAVENOUS at 11:16

## 2024-04-18 RX ADMIN — LIDOCAINE HYDROCHLORIDE 50 MG: 10 INJECTION, SOLUTION INFILTRATION; PERINEURAL at 11:10

## 2024-04-18 RX ADMIN — DEXAMETHASONE SODIUM PHOSPHATE 10 MG: 10 INJECTION, SOLUTION INTRAMUSCULAR; INTRAVENOUS at 11:20

## 2024-04-18 RX ADMIN — KETAMINE HYDROCHLORIDE 10 MG: 10 INJECTION INTRAMUSCULAR; INTRAVENOUS at 11:22

## 2024-04-18 RX ADMIN — PROPOFOL 20 MG: 10 INJECTION, EMULSION INTRAVENOUS at 11:13

## 2024-04-18 RX ADMIN — ROCURONIUM BROMIDE 50 MG: 50 INJECTION, SOLUTION INTRAVENOUS at 11:10

## 2024-04-18 RX ADMIN — PROPOFOL 20 MG: 10 INJECTION, EMULSION INTRAVENOUS at 11:14

## 2024-04-18 RX ADMIN — ONDANSETRON 4 MG: 2 INJECTION INTRAMUSCULAR; INTRAVENOUS at 11:20

## 2024-04-18 RX ADMIN — SUGAMMADEX 200 MG: 100 INJECTION, SOLUTION INTRAVENOUS at 11:51

## 2024-04-18 RX ADMIN — PROPOFOL 200 MG: 10 INJECTION, EMULSION INTRAVENOUS at 11:10

## 2024-04-18 RX ADMIN — PROPOFOL 150 MCG/KG/MIN: 10 INJECTION, EMULSION INTRAVENOUS at 11:10

## 2024-04-18 ASSESSMENT — ACTIVITIES OF DAILY LIVING (ADL)
ADLS_ACUITY_SCORE: 38

## 2024-04-18 ASSESSMENT — LIFESTYLE VARIABLES: TOBACCO_USE: 0

## 2024-04-18 NOTE — ANESTHESIA CARE TRANSFER NOTE
Patient: Shakila Kapoor    Procedure: Procedure(s):  BRONCHOSCOPY, WITH ENDOBRONCHIAL ULTRASOUND       Diagnosis: Lymphadenopathy [R59.1]  Diagnosis Additional Information: No value filed.    Anesthesia Type:   General     Note:    Oropharynx: oropharynx clear of all foreign objects  Level of Consciousness: drowsy  Oxygen Supplementation: face mask  Level of Supplemental Oxygen (L/min / FiO2): 8  Independent Airway: airway patency satisfactory and stable  Dentition: dentition unchanged  Vital Signs Stable: post-procedure vital signs reviewed and stable  Report to RN Given: handoff report given  Patient transferred to: PACU    Handoff Report: Identifed the Patient, Identified the Reponsible Provider, Reviewed the pertinent medical history, Discussed the surgical course, Reviewed Intra-OP anesthesia mangement and issues during anesthesia, Set expectations for post-procedure period and Allowed opportunity for questions and acknowledgement of understanding      Vitals:  Vitals Value Taken Time   /63 04/18/24 1207   Temp 97.1    Pulse 89 04/18/24 1209   Resp 24 04/18/24 1209   SpO2 94 % 04/18/24 1209   Vitals shown include unfiled device data.    Electronically Signed By: CARLOS Beasley CRNA  April 18, 2024  12:10 PM

## 2024-04-18 NOTE — BRIEF OP NOTE
[facility]    Brief Operative Note    Pre-operative diagnosis: Lymphadenopathy [R59.1]  Post-operative diagnosis Same as pre-operative diagnosis    Procedure: BRONCHOSCOPY, WITH ENDOBRONCHIAL ULTRASOUND, N/A - Bronchus    Surgeon: Surgeons and Role:     * Ge aPrham MD - Primary  Anesthesia: General   Estimated Blood Loss: None    Drains: None  Specimens:   ID Type Source Tests Collected by Time Destination   1 : LYMPH NODE FNA 4R Fine Needle Aspiration Endobronchial FINE NEEDLE ASPIRATE Ge Parham MD 4/18/2024 11:24 AM    2 : LYMPH NODE FNA 11R Fine Needle Aspiration Endobronchial FINE NEEDLE ASPIRATE Ge Parham MD 4/18/2024 11:30 AM    3 : LYMPH NODE FNA 11L Fine Needle Aspiration Endobronchial FINE NEEDLE ASPIRATE Ge Parham MD 4/18/2024 11:37 AM    4 : LYMPH NODE Fine Needle Aspiration Lung, Right FLOW CYTOMETRY Ge Parham MD 4/18/2024 11:41 AM    5 : ENDOBRONCHIAL BIOPSY RIGHT MIDDLE LOBE Biopsy Endobronchial SURGICAL PATHOLOGY EXAM Ge Parham MD 4/18/2024 11:45 AM    6 :  Bronchial Alveolar Lavage Lung, Middle Lobe, Right AFB CULTURE AND STAIN NON BLOOD, CELL COUNT WITH DIFFERENTIAL FLUID, GRAM STAIN, FUNGAL OR YEAST CULTURE ROUTINE, LYMPHOCYTE SUBSET BRONCHIAL, NON-GYNECOLOGIC CYTOLOGY, RESPIRATORY AEROBIC BACTERIAL CULTURE Ge Parham MD 4/18/2024 11:48 AM      Findings:   Firm lymph nodes identified .  Complications: None.  Implants: * No implants in log *

## 2024-04-18 NOTE — PROGRESS NOTES
Respiratory Therapy Procedure Note    Assisted Dr. Parham  with EBUS/Bronchoscopy procedure in the OR.  EBUS was done successfully without any complication. Three lymph nodes were sampled by FNA.  BAL sample obtained from RML and handed to circulating nurse and part of BAL sample handed to sarcoidosis research assistant.  Biopsy samples obtained from the RML by using biopsy forceps and handed to circulating nurse.  Pathology was present for slide samples.  Airway managed by CRNA. PT extubated in OR and was transported to PACU.    Clint De Paz, RT  4/18/2024

## 2024-04-18 NOTE — ANESTHESIA PREPROCEDURE EVALUATION
Anesthesia Pre-Procedure Evaluation    Patient: Shakila Kapoor   MRN: [id number] : 1965        Procedure : Procedure(s):  BRONCHOSCOPY, WITH ENDOBRONCHIAL ULTRASOUND ([place])      Past Medical History:   Diagnosis Date    Benign essential hypertension 2022    Benign meningioma (H)     Gait instability     Kidney stone 10/25/2023    Lymphadenopathy     Meningioma (H)     Mixed hyperlipidemia 2022    Multiple sclerosis (H)     Pulmonary nodules       Past Surgical History:   Procedure Laterality Date    COLONOSCOPY N/A 3/6/2024    Procedure: COLONOSCOPY, WITH POLYPECTOMY;  Surgeon: Rosa Maria Ruiz MD;  Location: [facility]    DISCECTOMY, FUSION CERVICAL ANTERIOR TWO LEVELS, COMBINED N/A 2022    Procedure: Anterior Cervical 4-6 Fusion with interbody cages and cervical plate;  Surgeon: Be Solorzano MD;  Location: [facility]      Allergies   Allergen Reactions    Penicillins       Social History     Tobacco Use    Smoking status: Never    Smokeless tobacco: Never   Substance Use Topics    Alcohol use: Not Currently     Comment: rarely      Wt Readings from Last 1 Encounters:   24 87.6 kg (193 lb 1.6 oz)        Anesthesia Evaluation   Pt has had prior anesthetic. Type: MAC.    No history of anesthetic complications       ROS/MED HX  ENT/Pulmonary: Comment: 7mm pulmonary nodule     (+)     MEHRAN risk factors, snores loudly,   daytime somnolence,                            (-) tobacco use   Neurologic: Comment: H/o meningioma s/p radiotherapy 5days/week for 5 weeks.         (+)    peripheral neuropathy, - fingertips.              Multiple Sclerosis, limitations: with balance difficulty and coordination , impaired vision of R eye.            Cardiovascular: Comment: Denies cardiac symptoms including chest pain, SOB, palpitations, syncope, WHITE, orthopnea, or PND.      (+)  hypertension (Currently working with PCP on lifestyle modifications. 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  Able to easily walk >2 blocks.  Able to go up and down a flight of stairs.    Hematologic:  - neg hematologic  ROS     Musculoskeletal: Comment: Cervical spondylosis with myelopathy.       GI/Hepatic:  - neg GI/hepatic ROS     Renal/Genitourinary:  - neg Renal ROS     Endo:     (+)               Obesity,       Psychiatric/Substance Use:     (+) psychiatric history anxiety    (-) alcohol abuse history and chronic opioid use history   Infectious Disease: Comment: Completed COVID vaccines and boosters.    COVID (+) June 2022 - mild symptoms. No residual.  - neg infectious disease ROS     Malignancy:  - neg malignancy ROS     Other:  - neg other ROS          Physical Exam    Airway        Mallampati: II   TM distance: > 3 FB   Neck ROM: full   Mouth opening: > 3 cm    Respiratory Devices and Support         Dental  no notable dental history         Cardiovascular   cardiovascular exam normal          Pulmonary   pulmonary exam normal                OUTSIDE LABS:  CBC:   Lab Results   Component Value Date    WBC 6.9 03/21/2024    WBC 6.3 11/16/2023    HGB 15.0 03/21/2024    HGB 14.8 11/16/2023    HCT 45.2 03/21/2024    HCT 43.7 11/16/2023     03/21/2024     11/16/2023     BMP:   Lab Results   Component Value Date     03/21/2024     06/01/2023    POTASSIUM 4.7 03/21/2024    POTASSIUM 4.0 06/01/2023    CHLORIDE 104 03/21/2024    CHLORIDE 106 06/01/2023    CO2 26 03/21/2024    CO2 23 06/01/2023    BUN 15.6 03/21/2024    BUN 12.0 06/01/2023    CR 0.76 03/21/2024    CR 0.77 06/01/2023    GLC 95 03/21/2024     (H) 06/01/2023     COAGS: No results found for: \"PTT\", \"INR\", \"FIBR\"  POC: No results found for: \"BGM\", \"HCG\", \"HCGS\"  HEPATIC:   Lab Results "   Component Value Date    ALBUMIN 4.5 03/21/2024    PROTTOTAL 7.1 03/21/2024    ALT 39 03/21/2024    AST 25 03/21/2024    ALKPHOS 87 03/21/2024    BILITOTAL 0.4 03/21/2024     OTHER:   Lab Results   Component Value Date    A1C 5.1 05/25/2022    ANOOP 9.7 03/21/2024    TSH 1.85 06/01/2023    T4 0.91 06/01/2023    SED 7 03/21/2024       Anesthesia Plan    ASA Status:  3    NPO Status:  NPO Appropriate    Anesthesia Type: General.     - Airway: ETT   Induction: Intravenous.   Maintenance: TIVA.        Consents    Anesthesia Plan(s) and associated risks, benefits, and realistic alternatives discussed. Questions answered and patient/representative(s) expressed understanding.     - Discussed: Risks, Benefits and Alternatives for BOTH SEDATION and the PROCEDURE were discussed     - Discussed with:  Patient      - Extended Intubation/Ventilatory Support Discussed: Yes.      Use of blood products discussed: Yes.     - Discussed with: Patient.     Postoperative Care    Pain management: IV analgesics, Oral pain medications, Multi-modal analgesia.   PONV prophylaxis: Ondansetron (or other 5HT-3), Dexamethasone or Solumedrol     Comments:    Other Comments: 8.0 ETT     Patient with hx of MS, discussed risks of flare after anesthesia. Patient history and physical exam reviewed. NPO status appropriate. Focused physical and history performed, pre-op evaluation updated. RBA discussed re: anesthesia and patients questions answered.          H&P reviewed: Unable to attach H&P to encounter due to EHR limitations. H&P Update: appropriate H&P reviewed, patient examined. No interval changes since H&P (within 30 days).         Evan Roche MD

## 2024-04-18 NOTE — H&P
HPI Update    HPI: No change since her recent virtual visit.    BP (!) 157/79   Pulse 109   Temp 98.2  F (36.8  C) (Oral)   Resp 20   Wt 87.6 kg (193 lb 1.6 oz)   SpO2 99%   BMI 31.79 kg/m    Breathing comfortably  No audible wheezing or stridor  RRR, normal pulse  Normal affect, no anxiety  Awake and conversant.    Patient consented for EBUS under general anesthesia.    Ge Parham MD

## 2024-04-18 NOTE — ANESTHESIA POSTPROCEDURE EVALUATION
Patient: Shakila Kapoor    Procedure: Procedure(s):  BRONCHOSCOPY, WITH ENDOBRONCHIAL ULTRASOUND       Anesthesia Type:  General    Note:  Disposition: Outpatient   Postop Pain Control: Uneventful            Sign Out: Well controlled pain   PONV: No   Neuro/Psych: Uneventful            Sign Out: Acceptable/Baseline neuro status   Airway/Respiratory: Uneventful            Sign Out: Acceptable/Baseline resp. status   CV/Hemodynamics: Uneventful            Sign Out: Acceptable CV status; No obvious hypovolemia; No obvious fluid overload   Other NRE: NONE   DID A NON-ROUTINE EVENT OCCUR? No           Last vitals:  Vitals Value Taken Time   /58 04/18/24 1245   Temp 36.8  C (98.2  F) 04/18/24 1245   Pulse 91 04/18/24 1251   Resp 17 04/18/24 1247   SpO2 93 % 04/18/24 1251   Vitals shown include unfiled device data.    Electronically Signed By: Evan Roche MD  April 18, 2024  1:18 PM

## 2024-04-19 LAB
CD19 CELLS NFR BRONCH: 5 %
CD3 CELLS NFR BRONCH: 87 %
CD3+CD4+ CELLS NFR BRONCH: 68 %
CD3+CD4+ CELLS/CD3+CD8+ CLL BRONCH: 3.09 %
CD3+CD8+ CELLS NFR BRONCH: 22 %
CD3-CD16+CD56+ CELLS NFR SPEC: 7 %
LYMPHOCYTE SUBSET BRONCHIAL EXTERNAL COMMENT: NORMAL
PATH REPORT.COMMENTS IMP SPEC: NORMAL
PATH REPORT.FINAL DX SPEC: NORMAL
PATH REPORT.FINAL DX SPEC: NORMAL
PATH REPORT.GROSS SPEC: NORMAL
PATH REPORT.MICROSCOPIC SPEC OTHER STN: NORMAL
PATH REPORT.MICROSCOPIC SPEC OTHER STN: NORMAL
PATH REPORT.RELEVANT HX SPEC: NORMAL
PATH REPORT.RELEVANT HX SPEC: NORMAL
PHOTO IMAGE: NORMAL

## 2024-04-19 PROCEDURE — 88312 SPECIAL STAINS GROUP 1: CPT | Mod: 26 | Performed by: PATHOLOGY

## 2024-04-19 PROCEDURE — 88305 TISSUE EXAM BY PATHOLOGIST: CPT | Mod: 26 | Performed by: PATHOLOGY

## 2024-04-20 LAB — BACTERIA BRONCH: NO GROWTH

## 2024-04-20 NOTE — PROCEDURES
INTERVENTIONAL PULMONOLOGY     Procedure(s):    A flexible bronchoscopy  Airway exam  BAL  EBUS-TBNA (3 sites)  Endobronchial biopsies (1 sites)    Indication: Concern for sarcoidosis    Attending of Record:  Ge Parham MD    Interventional Pulmonary Fellow   None    Trainees Present:   None     Medications:    General Anesthesia - See anesthesia flowsheet for details    Sedation Time:   Per Anesthesia Care Provider    Time Out:  Performed    The patient's medical record has been reviewed.  The indication for the procedure was reviewed.  The necessary history and physical examination was performed and reviewed.  The risks, benefits and alternatives of the procedure were discussed with the the patient in detail and she had the opportunity to ask questions.  I discussed in particular the potential complications including risks of minor or life-threatening bleeding and/or infection, respiratory failure, vocal cord trauma / paralysis, pneumothorax, and discomfort. Sedation risks were also discussed including abnormal heart rhythms, low blood pressure, and respiratory failure. All questions were answered to the best of my ability.  Verbal and written informed consent was obtained.  The proposed procedure and the patient's identification were verified prior to the procedure by the physician and the surgical team.    The patient was assessed for the adequacy for the procedure and to receive medications.   Mental Status:  Alert and oriented x 3  Airway examination:  Class II (Complete visualization of uvula)  Pulmonary: No audible wheezes or stridor  CV:  RRR, no murmurs or gallops  ASA Grade:  (I)  Normal healthy patient    After clinical evaluation and reviewing the indication, risks, alternatives and benefits of the procedure the patient was deemed to be in satisfactory condition to undergo the procedure.           A Tuberculosis risk assessment was performed:  The patient has no known RISK of  Tuberculosis    The procedure was performed in a negative airflow room: The patient could not be moved to a negative airflow room because of needed OR for the procedure    Maneuvers / Procedure:      A Flexible  bronchoscope was used for the procedure. The patient was orally intubated with a # 8 ETT and the flexible scope was passed through.      Airway Examination: A complete airway examination was performed from the distal trachea to the subsegmental level in each lobe of both lungs.  Pertinent findings include normal appearing airways.           BAL: The bronchoscope was wedged into the RML bronchus. A total of 180cc of saline was instilled and a total of 75 ml was aspirated. This was sent for analysis.   EBUS-TBNA (Lymph Node)   4R enlarged and very firm 4 passes  11R enlarged and very firm 4 passes  11L enlarged and very firm 4 passes  Sample also sent for flow cytometry    Endobronchial biopsy 4 biopsies done at [facility] and [facility].    Relevant Pictures      Recommendations:     -->  follow up with Dr. Williamson and sarcoidosis team.    Ge Parham MD

## 2024-04-22 LAB
PATH REPORT.COMMENTS IMP SPEC: NORMAL
PATH REPORT.FINAL DX SPEC: NORMAL
PATH REPORT.GROSS SPEC: NORMAL
PATH REPORT.MICROSCOPIC SPEC OTHER STN: NORMAL

## 2024-04-22 PROCEDURE — 88305 TISSUE EXAM BY PATHOLOGIST: CPT | Mod: 26 | Performed by: PATHOLOGY

## 2024-04-24 PROCEDURE — 88305 TISSUE EXAM BY PATHOLOGIST: CPT | Mod: 26 | Performed by: PATHOLOGY

## 2024-04-24 PROCEDURE — 88173 CYTOPATH EVAL FNA REPORT: CPT | Mod: 26 | Performed by: PATHOLOGY

## 2024-04-24 PROCEDURE — 88177 CYTP FNA EVAL EA ADDL: CPT | Mod: 26 | Performed by: PATHOLOGY

## 2024-04-24 PROCEDURE — 88312 SPECIAL STAINS GROUP 1: CPT | Mod: 26 | Performed by: PATHOLOGY

## 2024-04-24 PROCEDURE — 88172 CYTP DX EVAL FNA 1ST EA SITE: CPT | Mod: 26 | Performed by: PATHOLOGY

## 2024-04-25 ENCOUNTER — TELEPHONE (OUTPATIENT)
Dept: PULMONOLOGY | Facility: CLINIC | Age: 59
End: 2024-04-25
Payer: COMMERCIAL

## 2024-04-25 DIAGNOSIS — R59.1 LYMPHADENOPATHY: Primary | ICD-10-CM

## 2024-04-25 NOTE — TELEPHONE ENCOUNTER
Called patient to discuss results. FNA shows granuloma in one lymph node and in an endobronchial biopsy. Lymph nodes were very firm and difficult to sample per discussion with Dr. Parham. BAL microbiology data negative. Flow cytometry not consistent with lymphoma. Plan to refer to sarcoid clinic, patient is agreeable.     There is a 7mm left hilar lymph node vs DARIN nodule. Will tentatively plan for 6 month CT, but if repeat CT scan/return visit is planned from the sarcoid clinic, she can follow with them.     oKrin Williamson MD  Interventional Pulmonary

## 2024-04-29 ENCOUNTER — TELEPHONE (OUTPATIENT)
Dept: PULMONOLOGY | Facility: CLINIC | Age: 59
End: 2024-04-29
Payer: COMMERCIAL

## 2024-04-29 DIAGNOSIS — R91.8 PULMONARY NODULES: Primary | ICD-10-CM

## 2024-04-29 NOTE — TELEPHONE ENCOUNTER
ILD New Patient Referral: Pre visit communication    Patient: Shakila Kapoor  Reason for Referral: Sarcoid  Referring Physician:   Korin Williamson MD   [address]   [facility] [address]   Phone: [phone]       Chest CT scan: 3/12/24 w/contrast.    Biopsy: Yes 4/18/24 [facility]  PFT's:4/4/24 [facility]  Additional testing:     Current symptoms: None  Current related prescriptions: NO    Supplemental oxygen? No    In review of apparent records and conversation with patient; recommend first visit with ILD provider be conducted :  In person visit  Testing needed: CT scan and Full PFT's and walk    Additional notes:

## 2024-05-09 ENCOUNTER — OFFICE VISIT (OUTPATIENT)
Dept: RADIATION ONCOLOGY | Facility: CLINIC | Age: 59
End: 2024-05-09
Attending: STUDENT IN AN ORGANIZED HEALTH CARE EDUCATION/TRAINING PROGRAM
Payer: COMMERCIAL

## 2024-05-09 ENCOUNTER — HOSPITAL ENCOUNTER (OUTPATIENT)
Dept: MRI IMAGING | Facility: CLINIC | Age: 59
Discharge: HOME | End: 2024-05-09
Attending: NURSE PRACTITIONER | Admitting: NURSE PRACTITIONER
Payer: COMMERCIAL

## 2024-05-09 VITALS
SYSTOLIC BLOOD PRESSURE: 119 MMHG | OXYGEN SATURATION: 98 % | RESPIRATION RATE: 16 BRPM | BODY MASS INDEX: 31.77 KG/M2 | HEART RATE: 101 BPM | DIASTOLIC BLOOD PRESSURE: 74 MMHG | WEIGHT: 193 LBS

## 2024-05-09 DIAGNOSIS — D32.0 PRIMARY MENINGIOMA OF OPTIC NERVE SHEATH (H): ICD-10-CM

## 2024-05-09 DIAGNOSIS — T66.XXXS RADIATION ADVERSE EFFECT, SEQUELA: ICD-10-CM

## 2024-05-09 DIAGNOSIS — Z92.3 STATUS POST RADIATION THERAPY: Primary | ICD-10-CM

## 2024-05-09 DIAGNOSIS — D32.0 PRIMARY MENINGIOMA OF OPTIC NERVE SHEATH (H): Primary | ICD-10-CM

## 2024-05-09 PROCEDURE — 70553 MRI BRAIN STEM W/O & W/DYE: CPT | Mod: 26 | Performed by: STUDENT IN AN ORGANIZED HEALTH CARE EDUCATION/TRAINING PROGRAM

## 2024-05-09 PROCEDURE — A9585 GADOBUTROL INJECTION: HCPCS | Performed by: NURSE PRACTITIONER

## 2024-05-09 PROCEDURE — 99213 OFFICE O/P EST LOW 20 MIN: CPT | Performed by: STUDENT IN AN ORGANIZED HEALTH CARE EDUCATION/TRAINING PROGRAM

## 2024-05-09 PROCEDURE — 70553 MRI BRAIN STEM W/O & W/DYE: CPT

## 2024-05-09 PROCEDURE — 99212 OFFICE O/P EST SF 10 MIN: CPT | Performed by: STUDENT IN AN ORGANIZED HEALTH CARE EDUCATION/TRAINING PROGRAM

## 2024-05-09 PROCEDURE — 255N000002 HC RX 255 OP 636: Performed by: NURSE PRACTITIONER

## 2024-05-09 RX ORDER — GADOBUTROL 604.72 MG/ML
10 INJECTION INTRAVENOUS ONCE
Status: COMPLETED | OUTPATIENT
Start: 2024-05-09 | End: 2024-05-09

## 2024-05-09 RX ADMIN — GADOBUTROL 8 ML: 604.72 INJECTION INTRAVENOUS at 08:14

## 2024-05-09 ASSESSMENT — PAIN SCALES - GENERAL: PAINLEVEL: NO PAIN (0)

## 2024-05-09 NOTE — LETTER
"    2024         RE: Shakila Kapoor  [address] [place] [address]        Dear Colleague,    Thank you for referring your patient, Shakila Kapoor, to the [facility] RADIATION ONCOLOGY. Please see a copy of my visit note below.    Oncology Rooming Note    May 9, 2024 12:20 PM   Shakila Kapoor is a 58 year old female who presents for:    Chief Complaint   Patient presents with     Radiation Therapy     Follow up to radiation therapy      Initial Vitals: Wt 87.5 kg (193 lb)   BMI 31.77 kg/m   Estimated body mass index is 31.77 kg/m  as calculated from the following:    Height as of 24: 1.66 m (5' 5.35\").    Weight as of this encounter: 87.5 kg (193 lb). Body surface area is 2.01 meters squared.  No Pain (0) Comment: Data Unavailable   No LMP recorded. Patient is postmenopausal.  Allergies reviewed: Yes  Medications reviewed: Yes    Medications: Medication refills not needed today.  Pharmacy name entered into [facility]:    [facility] - [place], [place] - [address] [place] [address] PHARMACY [place], [place] - [address] [place] [address].  [facility] [place], [place] - [address] [place] [address]    Frailty Screening:   Is the patient here for a new oncology consult visit in cancer care? 2. No      Clinical concerns: denies   Dr. Carney was notified.    Follows with Dr. Charles Hernadez, RN                   Department of Radiation Oncology  [facility]  [address] [place] [address] [phone]       Radiation Oncology Follow-up Visit  May 9, 2024    Shakila Kapoor  MRN: [id number]   : 1965     DISEASE TREATED:   Right optic nerve sheath meningioma    RADIATION THERAPY DELIVERED:   Right optic nerve: 5040 cGy in 28 fractions, from 3/8/2021 - 2021    SYSTEMIC THERAPY:  None    INTERVAL SINCE COMPLETION OF RADIATION THERAPY:   3 years    SUBJECTIVE:   Ms. Kapoor is a 56 year old female " with a PMH significant for relapsing multiple sclerosis initially diagnosed at age 21. She was diagnosed with a right optic nerve sheath meningioma in 2021 with imaging demonstrating thickening and enhancement of the right optic nerve sheath beginning just proximal to the entrance to the optic canal and extending into the intraorbital segment near the orbital apex. She underwent fractionated external beam radiation therapy as described above.    Ms. Kapoor returns to radiation oncology clinic today for a routine posttreatment disease surveillance visit.  She continues to follow with neurology and is on Mayzent.     Overall, she reports that she is doing well and has no pressing concerns or complaints.   No change in vision in the last year. 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  She is due to see neuro ophthalmology in July.     OBJECTIVE:  PHYSICAL EXAM (video):  General: Healthy-appearing 58-year-old female seated comfortably in no acute distress    LABS AND IMAGIN2024 MRI Brain and Orbits  IMPRESSION:  1. Stable right optic nerve sheath meningioma.  2. Stable chronic demyelinating disease plaque burden. No evidence of  active demyelination.    IMPRESSION:   Ms. Kapoor is a 58 year old female with a right optic nerve sheath meningioma. She is about three years out from the completion of radiotherapy with stable clinical and radiographic findings.    PLAN:   1. Follow-up in clinic with MRI brain in 1 year  2. Continue follow-up with Dr. Soto in ophthalmology  3. We asked the patient to have labs drawn at a scheduled lab visit after her MRI brain  for assessment of pituitary function with us yearly. We order this year's labs as well as next years to be completed with her imaging next year before follow up with us. We will let her know if any values were abnormal for this year's testing.     The overall time I spent on direct patient care including self review of records, labs, and radiographic  studies, as well as, direct face-to-face interaction with the patient and coordination of care with other providers was 15 minutes.      Rosie Carney MD, PhD     Department of Radiation Oncology  [facility]

## 2024-05-09 NOTE — PROGRESS NOTES
"Oncology Rooming Note    May 9, 2024 12:20 PM   Shakila Kapoor is a 58 year old female who presents for:    Chief Complaint   Patient presents with    Radiation Therapy     Follow up to radiation therapy      Initial Vitals: Wt 87.5 kg (193 lb)   BMI 31.77 kg/m   Estimated body mass index is 31.77 kg/m  as calculated from the following:    Height as of 4/4/24: 1.66 m (5' 5.35\").    Weight as of this encounter: 87.5 kg (193 lb). Body surface area is 2.01 meters squared.  No Pain (0) Comment: Data Unavailable   No LMP recorded. Patient is postmenopausal.  Allergies reviewed: Yes  Medications reviewed: Yes    Medications: Medication refills not needed today.  Pharmacy name entered into [facility]:    [facility] - [place], [place] [facility] [address] [facility] [address] [facility] [place] [facility] [place] [place] - [address]  [facility] [place], [place] - [address]    Frailty Screening:   Is the patient here for a new oncology consult visit in cancer care? 2. No      Clinical concerns: denies   Dr. Carney was notified.    Follows with Dr. Charles Hernadez, RN              "

## 2024-05-16 ENCOUNTER — LAB (OUTPATIENT)
Dept: LAB | Facility: CLINIC | Age: 59
End: 2024-05-16
Payer: COMMERCIAL

## 2024-05-16 ENCOUNTER — OFFICE VISIT (OUTPATIENT)
Dept: NEUROLOGY | Facility: CLINIC | Age: 59
End: 2024-05-16
Attending: PSYCHIATRY & NEUROLOGY
Payer: COMMERCIAL

## 2024-05-16 VITALS
WEIGHT: 194 LBS | DIASTOLIC BLOOD PRESSURE: 79 MMHG | BODY MASS INDEX: 31.93 KG/M2 | SYSTOLIC BLOOD PRESSURE: 121 MMHG | OXYGEN SATURATION: 96 % | HEART RATE: 96 BPM

## 2024-05-16 DIAGNOSIS — R39.15 URINARY URGENCY: Primary | ICD-10-CM

## 2024-05-16 DIAGNOSIS — Z92.3 STATUS POST RADIATION THERAPY: ICD-10-CM

## 2024-05-16 DIAGNOSIS — G35 MS (MULTIPLE SCLEROSIS) (H): ICD-10-CM

## 2024-05-16 DIAGNOSIS — G35 MS (MULTIPLE SCLEROSIS) (H): Primary | ICD-10-CM

## 2024-05-16 DIAGNOSIS — D32.0 MENINGIOMA OF OPTIC NERVE SHEATH (H): ICD-10-CM

## 2024-05-16 DIAGNOSIS — D86.9 SARCOIDOSIS: ICD-10-CM

## 2024-05-16 DIAGNOSIS — R39.15 URINARY URGENCY: ICD-10-CM

## 2024-05-16 LAB
ALBUMIN SERPL BCG-MCNC: 4.4 G/DL (ref 3.5–5.2)
ALP SERPL-CCNC: 66 U/L (ref 40–150)
ALT SERPL W P-5'-P-CCNC: 19 U/L (ref 0–50)
ANION GAP SERPL CALCULATED.3IONS-SCNC: 9 MMOL/L (ref 7–15)
AST SERPL W P-5'-P-CCNC: 22 U/L (ref 0–45)
BACTERIA BRONCH: NO GROWTH
BASOPHILS # BLD AUTO: 0 10E3/UL (ref 0–0.2)
BASOPHILS NFR BLD AUTO: 0 %
BILIRUB DIRECT SERPL-MCNC: <0.2 MG/DL (ref 0–0.3)
BILIRUB SERPL-MCNC: 0.4 MG/DL
BUN SERPL-MCNC: 11.7 MG/DL (ref 6–20)
CALCIUM SERPL-MCNC: 9.3 MG/DL (ref 8.6–10)
CHLORIDE SERPL-SCNC: 107 MMOL/L (ref 98–107)
CORTIS SERPL-MCNC: 13.3 UG/DL
CREAT SERPL-MCNC: 0.71 MG/DL (ref 0.51–0.95)
DEPRECATED HCO3 PLAS-SCNC: 26 MMOL/L (ref 22–29)
EGFRCR SERPLBLD CKD-EPI 2021: >90 ML/MIN/1.73M2
EOSINOPHIL # BLD AUTO: 0.1 10E3/UL (ref 0–0.7)
EOSINOPHIL NFR BLD AUTO: 2 %
ERYTHROCYTE [DISTWIDTH] IN BLOOD BY AUTOMATED COUNT: 13.3 % (ref 10–15)
FSH SERPL IRP2-ACNC: 49.3 MIU/ML
GLUCOSE SERPL-MCNC: 84 MG/DL (ref 70–99)
HCT VFR BLD AUTO: 44.7 % (ref 35–47)
HGB BLD-MCNC: 15.2 G/DL (ref 11.7–15.7)
IMM GRANULOCYTES # BLD: 0 10E3/UL
IMM GRANULOCYTES NFR BLD: 0 %
LH SERPL-ACNC: 32.9 MIU/ML
LYMPHOCYTES # BLD AUTO: 0.4 10E3/UL (ref 0.8–5.3)
LYMPHOCYTES NFR BLD AUTO: 9 %
MCH RBC QN AUTO: 31.3 PG (ref 26.5–33)
MCHC RBC AUTO-ENTMCNC: 34 G/DL (ref 31.5–36.5)
MCV RBC AUTO: 92 FL (ref 78–100)
MONOCYTES # BLD AUTO: 0.5 10E3/UL (ref 0–1.3)
MONOCYTES NFR BLD AUTO: 10 %
NEUTROPHILS # BLD AUTO: 4 10E3/UL (ref 1.6–8.3)
NEUTROPHILS NFR BLD AUTO: 79 %
NRBC # BLD AUTO: 0 10E3/UL
NRBC BLD AUTO-RTO: 0 /100
PLATELET # BLD AUTO: 272 10E3/UL (ref 150–450)
POTASSIUM SERPL-SCNC: 4.6 MMOL/L (ref 3.4–5.3)
PROLACTIN SERPL 3RD IS-MCNC: 15 NG/ML (ref 5–23)
PROT SERPL-MCNC: 7 G/DL (ref 6.4–8.3)
RBC # BLD AUTO: 4.85 10E6/UL (ref 3.8–5.2)
SODIUM SERPL-SCNC: 142 MMOL/L (ref 135–145)
TSH SERPL DL<=0.005 MIU/L-ACNC: 2.24 UIU/ML (ref 0.3–4.2)
WBC # BLD AUTO: 5.1 10E3/UL (ref 4–11)

## 2024-05-16 PROCEDURE — 82533 TOTAL CORTISOL: CPT | Performed by: STUDENT IN AN ORGANIZED HEALTH CARE EDUCATION/TRAINING PROGRAM

## 2024-05-16 PROCEDURE — 99214 OFFICE O/P EST MOD 30 MIN: CPT | Mod: GC | Performed by: PSYCHIATRY & NEUROLOGY

## 2024-05-16 PROCEDURE — 99000 SPECIMEN HANDLING OFFICE-LAB: CPT | Performed by: PATHOLOGY

## 2024-05-16 PROCEDURE — 84443 ASSAY THYROID STIM HORMONE: CPT | Performed by: PATHOLOGY

## 2024-05-16 PROCEDURE — 83001 ASSAY OF GONADOTROPIN (FSH): CPT | Performed by: STUDENT IN AN ORGANIZED HEALTH CARE EDUCATION/TRAINING PROGRAM

## 2024-05-16 PROCEDURE — 85025 COMPLETE CBC W/AUTO DIFF WBC: CPT | Performed by: PATHOLOGY

## 2024-05-16 PROCEDURE — 99214 OFFICE O/P EST MOD 30 MIN: CPT | Performed by: PSYCHIATRY & NEUROLOGY

## 2024-05-16 PROCEDURE — 83002 ASSAY OF GONADOTROPIN (LH): CPT | Performed by: STUDENT IN AN ORGANIZED HEALTH CARE EDUCATION/TRAINING PROGRAM

## 2024-05-16 PROCEDURE — 84146 ASSAY OF PROLACTIN: CPT | Performed by: STUDENT IN AN ORGANIZED HEALTH CARE EDUCATION/TRAINING PROGRAM

## 2024-05-16 PROCEDURE — 84305 ASSAY OF SOMATOMEDIN: CPT | Performed by: STUDENT IN AN ORGANIZED HEALTH CARE EDUCATION/TRAINING PROGRAM

## 2024-05-16 PROCEDURE — 36415 COLL VENOUS BLD VENIPUNCTURE: CPT | Performed by: PATHOLOGY

## 2024-05-16 PROCEDURE — 80053 COMPREHEN METABOLIC PANEL: CPT | Performed by: PATHOLOGY

## 2024-05-16 PROCEDURE — 82248 BILIRUBIN DIRECT: CPT | Performed by: PATHOLOGY

## 2024-05-16 ASSESSMENT — PAIN SCALES - GENERAL: PAINLEVEL: NO PAIN (0)

## 2024-05-16 NOTE — Clinical Note
5/16/2024       RE: Shakila Kapoor  [address] [place] [address]     Dear Colleague,    Thank you for referring your patient, Shakila Kapoor, to the [facility] [place] at [facility]. Please see a copy of my visit note below.    Date of Service: 5/16/2024    [facility] Neurology   MS Clinic Follow-up     Subjective:   Shakila Kapoor is a 58 year old female who presents today for follow up for multiple sclerosis, as planned.      The patient was last seen as of:  11/2023     Disease onset: At age 20 left side sensory symptoms and probable optic neuritis. She was initially thought to have probable/possible MS. She did not have any neurologic changes until 2019 when she developed progressive vision loss in the right eye. 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 She then did develop demyelinating disease on MRI and was started on Rebif. She has significant vision loss in right eye.       Previous disease modifying therapy: Rebif (radiologic disease progression), then 2021 ozanimod. Treatment was changed to siponimod within the past year at the demand of her insurance company.    Since the patient was last seen in MS clinic in November 2023 she was transition to Mayzent from ozanimod and has been tolerating the medication well.  The patient was suffering from recurrent UTIs and during her evaluation a CT abdomen was obtained which showed enlarged lymph nodes in her abdomen which prompted the physicians to get an CT scan of her chest which showed lymph node enlargement in her lung which was biopsied after which she was given a diagnosis of sarcoidosis.  She has a follow-up visit with pulmonology in August 2024 for the same.  She has not started any treatment for sarcoidosis yet.  In March 2024  she experienced 3 weeks of right face numbness which went away but was curious if this was MS related.  Dr. Harris was contacted through [employer] for the same who did not think the symptoms were MS related.  An MRI of the brain was repeated on 5/9/2024 which did not show any new MS related lesions and a stable right optic nerve meningioma.  Otherwise the patient denies any weakness, numbness, tingling, vision changes, hearing changes, incontinence of bowel/bladder, word finding, slurred speech, confusion, incoordination.    Allergies   Allergen Reactions    Penicillins        Current Outpatient Medications   Medication Sig Dispense Refill    cholecalciferol (VITAMIN D3) 125 mcg (5000 units) capsule Take 1 capsule (125 mcg) by mouth daily 90 capsule 3    estradiol (VAGIFEM) 10 MCG TABS vaginal tablet Place 1 tablet (10 mcg) vaginally twice a week Place 1 tablet (10 mcg) vaginally daily for 14 days, THEN 1 tablet (10 mcg) twice a week for 76 days. 24 tablet 3    lisinopril (ZESTRIL) 10 MG tablet Take 1 tablet (10 mg) by mouth daily 90 tablet 1    LORazepam (ATIVAN) 0.5 MG tablet Take 0.5-1 mg by mouth daily as needed      MAYZENT 1 MG TABS Take 1 mg by mouth daily 30 tablet 11    PARoxetine (PAXIL) 30 MG tablet Take 30 mg by mouth every evening      cephALEXin (KEFLEX) 250 MG capsule Take 250 mg by mouth daily Prescribed by [facility] for recurrent UTIs (Patient not taking: Reported on 5/9/2024)       No current facility-administered medications for this visit.        Past medical, surgical, social and family history was personally reviewed. Pertinent details noted above.     Physical Examination:   /79 (BP Location: Right arm, Patient Position: Sitting, Cuff Size: Adult Regular)   Pulse 96   Wt 88 kg (194 lb)   SpO2 96%   BMI 31.93 kg/m      General: no acute distress  HEENT: NC/AT, no icterus, moist mucous membranes  Chest: non-labored on RA  Extremities: Warm, no edema  Skin: No rash or lesion    Psych: Affect appropriate for situation   Neuro:  Mental status: Awake, alert, attentive. Language is fluent with intact comprehension.   Cranial nerves: pupils equal, conjugate gaze, EOMI, face symmetric, shoulder shrug strong, tongue protrusion midline, no dysarthria.   Motor: Normal muscle bulk and tone. No abnormal movements.                              R          L  Deltoid             4          5  Biceps             5          5  Triceps            5          5  Wrist Flexion   5          5  Wrist Ext55  Finger Ext        5          5        Hip flexion       4+        4+  Knee flexion 5 5  Knee ext 5 5  Dorsiflexion 5 5     Reflexes: Brisk and symmetric throughout  Sensory: Intact to light touch. Romberg is with a very subtle sway.   Coordination: FNF without ataxia or dysmetria.    Gait: Normal width, external rotation of both legs, reduced stride length, turn, with symmetric arm swing. Tandem walk intact fwd with legs ext rotated. Able to rise from chair with arms crossed. Not able to rise from chair with each leg individually. Able to balance on each leg for > 2 seconds individually better on the right    Tests/Imaging:   MRI Brain 5/9/24:   1. Stable right optic nerve sheath meningioma.  2. Stable chronic demyelinating disease plaque burden. No evidence of  active demyelination.    Assessment:   Shakila Kapoor is a 58 year old female who presents for follow up for multiple sclerosis with symptoms of right optic neuritis and impaired vision on right eye, on Mayzent tolerating well.  She has been doing overall well from an MS standpoint with stable MRI on 5/9/2024.  Patient's neuroexam did not show any alarming signs today in clinic.  Patient was diagnosed with sarcoidosis and we recommended the patient to keep her follow-up appointments and go through with further evaluation management as per pulmonologist.  In regards to her urinary symptoms she has never gotten a urodynamic study done hence we are  referring the patient to urology for the same.  We will get some basic labs today in clinic and plan to follow-up with the patient in 6 months.  Patient endorsed understanding and agreed to the plan.      # multiple sclerosis   # urinary urgency   # gait instability   # nerve sheath meningioma   Plan:   -Continue Mayzent daily.  - Will obtain blood tests today with CBC and LFT for monitoring of this medication  - We will refer you to neurology clinic for evaluation of bladder symptoms with urodynamic study.  - Return to clinic in 6 months    Patient was seen and discussed with Dr. Harris.    Dion Saxena MD  Neurology Resident PGY 3    Attending physician: I saw and evaluated the patient with Dr. Saxena, and I agree with his findings and plan of care as documented above with the following additions.    I reviewed images from an MRI scan of the brain performed on 5/9/2024.  My independent interpretation is that these are stable with no evidence of interval or active demyelination in comparison to 5/15/2023.  There are no findings that would suggest neurologic sarcoidosis and no specific abnormality that would account for her transient right facial numbness earlier this year.    As above, we will refer to urology for evaluation of her bladder symptoms.    Remainder as above.    The longitudinal plans of care for the diagnoses as documented were addressed during this visit. Due to the added complexity in care, I will continue to support the patient in subsequent management and with ongoing continuity of care.    Daniel Harris MD   of Neurology  [facility]    Diagnoses:  1) Multiple sclerosis   2) Right optic nerve sheath meningioma  3) Urinary urgency  4) Sarcoidosis, without evidence of neurologic involvement      Again, thank you for allowing me to participate in the care of your patient.      Sincerely,    Daniel Harris,  MD

## 2024-05-16 NOTE — PROGRESS NOTES
Date of Service: 5/16/2024    [facility] Neurology   MS Clinic Follow-up     Subjective:   Shakila Kapoor is a 58 year old female who presents today for follow up for multiple sclerosis, as planned.      The patient was last seen as of:  11/2023     Disease onset: At age 20 left side sensory symptoms and probable optic neuritis. She was initially thought to have probable/possible MS. She did not have any neurologic changes until 2019 when she developed progressive vision loss in the right eye. 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 She then did develop demyelinating disease on MRI and was started on Rebif. She has significant vision loss in right eye.       Previous disease modifying therapy: Rebif (radiologic disease progression), then 2021 ozanimod. Treatment was changed to siponimod within the past year at the demand of her insurance company.    Since the patient was last seen in MS clinic in November 2023 she was transition to Mayzent from ozanimod and has been tolerating the medication well.  The patient was suffering from recurrent UTIs and during her evaluation a CT abdomen was obtained which showed enlarged lymph nodes in her abdomen which prompted the physicians to get an CT scan of her chest which showed lymph node enlargement in her lung which was biopsied after which she was given a diagnosis of sarcoidosis.  She has a follow-up visit with pulmonology in August 2024 for the same.  She has not started any treatment for sarcoidosis yet.  In March 2024 she experienced 3 weeks of right face numbness which went away but was curious if this was MS related.  Dr. Harris was contacted through [employer] for the same who did not think the symptoms were MS related.  An MRI of the brain was repeated on 5/9/2024 which did not show any new MS related lesions and a stable right optic  nerve meningioma.  Otherwise the patient denies any weakness, numbness, tingling, vision changes, hearing changes, incontinence of bowel/bladder, word finding, slurred speech, confusion, incoordination.    Allergies   Allergen Reactions    Penicillins        Current Outpatient Medications   Medication Sig Dispense Refill    cholecalciferol (VITAMIN D3) 125 mcg (5000 units) capsule Take 1 capsule (125 mcg) by mouth daily 90 capsule 3    estradiol (VAGIFEM) 10 MCG TABS vaginal tablet Place 1 tablet (10 mcg) vaginally twice a week Place 1 tablet (10 mcg) vaginally daily for 14 days, THEN 1 tablet (10 mcg) twice a week for 76 days. 24 tablet 3    lisinopril (ZESTRIL) 10 MG tablet Take 1 tablet (10 mg) by mouth daily 90 tablet 1    LORazepam (ATIVAN) 0.5 MG tablet Take 0.5-1 mg by mouth daily as needed      MAYZENT 1 MG TABS Take 1 mg by mouth daily 30 tablet 11    PARoxetine (PAXIL) 30 MG tablet Take 30 mg by mouth every evening      cephALEXin (KEFLEX) 250 MG capsule Take 250 mg by mouth daily Prescribed by [facility] for recurrent UTIs (Patient not taking: Reported on 5/9/2024)       No current facility-administered medications for this visit.        Past medical, surgical, social and family history was personally reviewed. Pertinent details noted above.     Physical Examination:   /79 (BP Location: Right arm, Patient Position: Sitting, Cuff Size: Adult Regular)   Pulse 96   Wt 88 kg (194 lb)   SpO2 96%   BMI 31.93 kg/m      General: no acute distress  HEENT: NC/AT, no icterus, moist mucous membranes  Chest: non-labored on RA  Extremities: Warm, no edema  Skin: No rash or lesion   Psych: Affect appropriate for situation   Neuro:  Mental status: Awake, alert, attentive. Language is fluent with intact comprehension.   Cranial nerves: pupils equal, conjugate gaze, EOMI, face symmetric, shoulder shrug strong, tongue protrusion midline, no dysarthria.   Motor: Normal muscle bulk and tone. No abnormal movements.                               R          L  Deltoid             4          5  Biceps             5          5  Triceps            5          5  Wrist Flexion   5          5  Wrist Ext55  Finger Ext        5          5        Hip flexion       4+        4+  Knee flexion 5 5  Knee ext 5 5  Dorsiflexion 5 5     Reflexes: Brisk and symmetric throughout  Sensory: Intact to light touch. Romberg is with a very subtle sway.   Coordination: FNF without ataxia or dysmetria.    Gait: Normal width, external rotation of both legs, reduced stride length, turn, with symmetric arm swing. Tandem walk intact fwd with legs ext rotated. Able to rise from chair with arms crossed. Not able to rise from chair with each leg individually. Able to balance on each leg for > 2 seconds individually better on the right    Tests/Imaging:   MRI Brain 5/9/24:   1. Stable right optic nerve sheath meningioma.  2. Stable chronic demyelinating disease plaque burden. No evidence of  active demyelination.    Assessment:   Shakila Kapoor is a 58 year old female who presents for follow up for multiple sclerosis with symptoms of right optic neuritis and impaired vision on right eye, on Mayzent tolerating well.  She has been doing overall well from an MS standpoint with stable MRI on 5/9/2024.  Patient's neuroexam did not show any alarming signs today in clinic.  Patient was diagnosed with sarcoidosis and we recommended the patient to keep her follow-up appointments and go through with further evaluation management as per pulmonologist.  In regards to her urinary symptoms she has never gotten a urodynamic study done hence we are referring the patient to urology for the same.  We will get some basic labs today in clinic and plan to follow-up with the patient in 6 months.  Patient endorsed understanding and agreed to the plan.      # multiple sclerosis   # urinary urgency   # gait instability   # nerve sheath meningioma   Plan:   -Continue Mayzent daily.  -  Will obtain blood tests today with CBC and LFT for monitoring of this medication  - We will refer you to neurology clinic for evaluation of bladder symptoms with urodynamic study.  - Return to clinic in 6 months    Patient was seen and discussed with Dr. Harris.    Dion Saxena MD  Neurology Resident PGY 3    Attending physician: I saw and evaluated the patient with Dr. Saxena, and I agree with his findings and plan of care as documented above with the following additions.    I reviewed images from an MRI scan of the brain performed on 5/9/2024.  My independent interpretation is that these are stable with no evidence of interval or active demyelination in comparison to 5/15/2023.  There are no findings that would suggest neurologic sarcoidosis and no specific abnormality that would account for her transient right facial numbness earlier this year.    As above, we will refer to urology for evaluation of her bladder symptoms.    Remainder as above.    The longitudinal plans of care for the diagnoses as documented were addressed during this visit. Due to the added complexity in care, I will continue to support the patient in subsequent management and with ongoing continuity of care.    Daniel Harris MD   of Neurology  [facility]    Diagnoses:  1) Multiple sclerosis   2) Right optic nerve sheath meningioma  3) Urinary urgency  4) Sarcoidosis, without evidence of neurologic involvement

## 2024-05-16 NOTE — PATIENT INSTRUCTIONS
Continue Mayzent    2.   Blood tests today    3.   We will refer you to urology for evaluation and management of bladder symptoms    4.   Return to clinic in six months

## 2024-05-16 NOTE — NURSING NOTE
Chief Complaint   Patient presents with    MS    RECHECK     6 month follow up      Vitals were taken and medications were reconciled.   Ramez Acosta, EMT  7:58 AM

## 2024-05-17 LAB — IGF-I BLD-MCNC: 78 NG/ML (ref 48–235)

## 2024-05-28 NOTE — PROGRESS NOTES
Department of Radiation Oncology  [facility]  [address] [place] [address]  [phone]       Radiation Oncology Follow-up Visit  May 9, 2024    Shakila Kapoor  MRN: [id number]   : 1965     DISEASE TREATED:   Right optic nerve sheath meningioma    RADIATION THERAPY DELIVERED:   Right optic nerve: 5040 cGy in 28 fractions, from 3/8/2021 - 2021    SYSTEMIC THERAPY:  None    INTERVAL SINCE COMPLETION OF RADIATION THERAPY:   3 years    SUBJECTIVE:   Ms. Kapoor is a 56 year old female with a PMH significant for relapsing multiple sclerosis initially diagnosed at age 21. She was diagnosed with a right optic nerve sheath meningioma in 2021 with imaging demonstrating thickening and enhancement of the right optic nerve sheath beginning just proximal to the entrance to the optic canal and extending into the intraorbital segment near the orbital apex. She underwent fractionated external beam radiation therapy as described above.    Ms. Kapoor returns to radiation oncology clinic today for a routine posttreatment disease surveillance visit.  She continues to follow with neurology and is on Mayzent.     Overall, she reports that she is doing well and has no pressing concerns or complaints.   No change in vision in the last year. 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  She is due to see neuro ophthalmology in July.     OBJECTIVE:  PHYSICAL EXAM (video):  General: Healthy-appearing 58-year-old female seated comfortably in no acute distress    LABS AND IMAGIN2024 MRI Brain and Orbits  IMPRESSION:  1. Stable right optic nerve sheath meningioma.  2. Stable chronic demyelinating disease plaque burden. No evidence of  active demyelination.    IMPRESSION:   Ms. Kapoor is a 58 year old female with a right optic nerve sheath meningioma. She is about three years out from the completion of radiotherapy with stable clinical and radiographic findings.    PLAN:    1. Follow-up in clinic with MRI brain in 1 year  2. Continue follow-up with Dr. Soto in ophthalmology  3. We asked the patient to have labs drawn at a scheduled lab visit after her MRI brain  for assessment of pituitary function with us yearly. We order this year's labs as well as next years to be completed with her imaging next year before follow up with us. We will let her know if any values were abnormal for this year's testing.     The overall time I spent on direct patient care including self review of records, labs, and radiographic studies, as well as, direct face-to-face interaction with the patient and coordination of care with other providers was 15 minutes.      Rosie Carney MD, PhD     Department of Radiation Oncology  [facility]

## 2024-06-07 DIAGNOSIS — N95.2 VAGINAL ATROPHY: ICD-10-CM

## 2024-06-07 RX ORDER — ESTRADIOL 10 UG/1
10 INSERT VAGINAL
Qty: 24 TABLET | Refills: 3 | OUTPATIENT
Start: 2024-06-10

## 2024-06-11 DIAGNOSIS — N95.2 VAGINAL ATROPHY: ICD-10-CM

## 2024-06-11 RX ORDER — ESTRADIOL 10 UG/1
10 INSERT VAGINAL
Qty: 24 TABLET | Refills: 3 | OUTPATIENT
Start: 2024-06-13

## 2024-06-11 NOTE — TELEPHONE ENCOUNTER
Please call patient to find out what dose she is currently taking then forward to PCP for review.     Bee Cerda, KELLYN, RN   [facility]

## 2024-06-11 NOTE — TELEPHONE ENCOUNTER
Hello we do  not have refills on file we used them up due to pt is getting a month supply and there was only 3 refills on that script please send over new script.

## 2024-06-13 DIAGNOSIS — N95.2 VAGINAL ATROPHY: ICD-10-CM

## 2024-06-13 RX ORDER — ESTRADIOL 10 UG/1
10 INSERT VAGINAL
Qty: 24 TABLET | Refills: 3 | Status: SHIPPED | OUTPATIENT
Start: 2024-06-13

## 2024-06-14 LAB
ACID FAST STAIN (ARUP): NORMAL

## 2024-06-14 NOTE — TELEPHONE ENCOUNTER
MEDICAL RECORDS REQUEST   [facility]  Urology Clinic  [address] [place] [address]  PHONE: [phone]  Fax: [phone]        FUTURE VISIT INFORMATION                                                   Shakila Kapoor, : 1965 scheduled for future visit at [facility]    APPOINTMENT INFORMATION:  Date: 2024  Provider:  Leslie Avina PA-C  Reason for Visit/Diagnosis: Urinary urgency    REFERRAL INFORMATION:  Referring provider:  Daniel Harris MD in  MS      RECORDS REQUESTED FOR VISIT                                                     NOTES  STATUS/DETAILS   OFFICE NOTE from referring provider  yes, 2024 -- Daniel Harris MD in  MS   MEDICATION LIST  yes   LABS     URINALYSIS (UA)  yes   IMAGES  yes, 2024 -- CT ABD PELVIS     PRE-VISIT CHECKLIST      Joint diagnostic appointment coordinated correctly          (ensure right order & amount of time) Yes   RECORD COLLECTION COMPLETE Yes

## 2024-07-03 ENCOUNTER — OFFICE VISIT (OUTPATIENT)
Dept: OPHTHALMOLOGY | Facility: CLINIC | Age: 59
End: 2024-07-03
Payer: COMMERCIAL

## 2024-07-03 ENCOUNTER — TELEPHONE (OUTPATIENT)
Dept: OPHTHALMOLOGY | Facility: CLINIC | Age: 59
End: 2024-07-03

## 2024-07-03 DIAGNOSIS — H53.10 SUBJECTIVE VISUAL DISTURBANCE: Primary | ICD-10-CM

## 2024-07-03 DIAGNOSIS — H47.20 OPTIC ATROPHY, RIGHT EYE: ICD-10-CM

## 2024-07-03 DIAGNOSIS — Z79.899 ENCOUNTER FOR EYE EXAM DUE TO HIGH RISK MEDICATION: Primary | ICD-10-CM

## 2024-07-03 PROCEDURE — 92134 CPTRZ OPH DX IMG PST SGM RTA: CPT | Mod: GC | Performed by: OPHTHALMOLOGY

## 2024-07-03 PROCEDURE — 92014 COMPRE OPH EXAM EST PT 1/>: CPT | Mod: GC | Performed by: OPHTHALMOLOGY

## 2024-07-03 ASSESSMENT — SLIT LAMP EXAM - LIDS
COMMENTS: NORMAL
COMMENTS: NORMAL

## 2024-07-03 ASSESSMENT — TONOMETRY
OS_IOP_MMHG: 18
IOP_METHOD: ICARE
OD_IOP_MMHG: 21

## 2024-07-03 ASSESSMENT — VISUAL ACUITY
METHOD: SNELLEN - LINEAR
OD_SC: HM
OS_SC: 20/25
OS_SC+: +2

## 2024-07-03 ASSESSMENT — CONF VISUAL FIELD
OS_INFERIOR_NASAL_RESTRICTION: 0
OD_SUPERIOR_NASAL_RESTRICTION: 0
OD_SUPERIOR_TEMPORAL_RESTRICTION: 1
OS_SUPERIOR_NASAL_RESTRICTION: 0
OS_SUPERIOR_TEMPORAL_RESTRICTION: 0
OS_NORMAL: 1
OS_INFERIOR_TEMPORAL_RESTRICTION: 0

## 2024-07-03 ASSESSMENT — EXTERNAL EXAM - LEFT EYE: OS_EXAM: NORMAL

## 2024-07-03 ASSESSMENT — CUP TO DISC RATIO
OS_RATIO: 0.2
OD_RATIO: 0.2

## 2024-07-03 ASSESSMENT — EXTERNAL EXAM - RIGHT EYE: OD_EXAM: NORMAL

## 2024-07-03 NOTE — TELEPHONE ENCOUNTER
Left Voicemail (1st Attempt) for the patient to call back and schedule the following:    Appointment type: RETURN NEURO EYE  Provider:   Return date:   around 7/3/2025  Specialty phone number: [phone]  Additional appointment(s) needed: NONE  Additional Notes: Return in about 1 year (around 7/3/2025) for v/t/gtop/d/rnfl.     Provided Eye Clinic number for scheduling options and made a recall Letter.

## 2024-07-03 NOTE — LETTER
"July 3, 2024     RE:  :  MRN: Shakila Kapoor  1965  [id number]     Dear Dr. Harris,    Your patient,Shakila Kapoor, returned for neuro-ophthalmic follow up. My assessment and plan are below.  For further details, please see my attached clinic note.      Assessment & Plan     Shakila Kapoor is a 58 year old female with the following diagnoses:   1. Encounter for eye exam due to high risk medication    2. Optic atrophy, right eye         Follow up for evaluation of macular edema after starting MAYZENT, also has history of optic atrophy in right eye secondary to a meningioma and related radiation, which began in . Last visit was 22.  At that time, it was decided that we would observe with yearly exams. She was also recently diagnosed with sarcoidosis on pulmonary biopsy and has yet to start immunosuppression but has plans to follow up soon.     Today, the patient notes stable vision since her last visit and no episodes of eye pain. She is here because she switched medications per her neurologist to MAYZENT for management of her MS.     Visual acuity is HM from CF right eye, 20/20 left eye. OCT macula with trace intraretinal hyporeflectivity right eye improved from prior no macular edema in left eye.     MRI brain with and without contrast 24   \"IMPRESSION:  1. Stable right optic nerve sheath meningioma.  2. Stable chronic demyelinating disease plaque burden. No evidence of  active demyelination.\"    Personal read, stable enhancement of right orbital optic nerve from 5/15/23    This patient has optic atrophy in the right eye.  Compared to last visit, the structure and function of the optic nerve is stable. She has trace intraretinal edema right eye that is improved from prior. Her last OCT macula had intraretinal edema within the inner nuclear layer likely from long term optic atrophy that is improving. No edema left eye after starting MAYZENT.  She does not have cystoid macular edema as " a result of the medication.  Follow up 1-2 years sooner as needed for worsening symptoms.   Again, thank you for allowing me to participate in the care of your patient.      Sincerely,      Samuel Soto MD  Professor  Director of Neuro-Ophthalmology  [employer] [facility] [employer] Endowed Chair  Departments of Ophthalmology, Neurology, and Neurosurgery  [facility] [address]  [address] [place]  [address]  T - [phone]  F - [phone]  SARITHA [email or web address]      CC: Daniel Harris MD  [address] [id number]  [facility] [address]  Via In Basket    DX = inner nuclear layer cysts, optic atrophy

## 2024-07-03 NOTE — PROGRESS NOTES
"       Assessment & Plan     Shakila Kapoor is a 58 year old female with the following diagnoses:   1. Encounter for eye exam due to high risk medication    2. Optic atrophy, right eye         Follow up for evaluation of macular edema after starting MAYZENT, also has history of optic atrophy in right eye secondary to a meningioma and related radiation, which began in 2021. Last visit was 9/28/22.  At that time, it was decided that we would observe with yearly exams. She was also recently diagnosed with sarcoidosis on pulmonary biopsy and has yet to start immunosuppression but has plans to follow up soon.     Today, the patient notes stable vision since her last visit and no episodes of eye pain. She is here because she switched medications per her neurologist to MAYZENT for management of her MS.     Visual acuity is HM from CF right eye, 20/20 left eye. OCT macula with trace intraretinal hyporeflectivity right eye improved from prior no macular edema in left eye.     MRI brain with and without contrast 5/9/24   \"IMPRESSION:  1. Stable right optic nerve sheath meningioma.  2. Stable chronic demyelinating disease plaque burden. No evidence of  active demyelination.\"    Personal read, stable enhancement of right orbital optic nerve from 5/15/23    This patient has optic atrophy in the right eye.  Compared to last visit, the structure and function of the optic nerve is stable. She has trace intraretinal edema right eye that is improved from prior. Her last OCT macula had intraretinal edema within the inner nuclear layer likely from long term optic atrophy that is improving. No edema left eye after starting MAYZENT.  She does not have cystoid macular edema as a result of the medication.  Follow up 1-2 years sooner as needed for worsening symptoms.              Abdulaziz Aguilar MD  Resident Physician, PGY-3  Department of Ophthalmology     Attending Physician Attestation:  Complete documentation of historical and " exam elements from today's encounter can be found in the full encounter summary report (not reduplicated in this progress note).  I personally obtained the chief complaint(s) and history of present illness.  I confirmed and edited as necessary the review of systems, past medical/surgical history, family history, social history, and examination findings as documented by others; and I examined the patient myself.  I personally reviewed the relevant tests, images, and reports as documented above.  I formulated and edited as necessary the assessment and plan and discussed the findings and management plan with the patient and family. I personally reviewed the ophthalmic test(s) associated with this encounter, agree with the interpretation(s) as documented by the resident/fellow, and have edited the corresponding report(s) as necessary.  - Samuel Soto MD

## 2024-07-03 NOTE — NURSING NOTE
Chief Complaints and History of Present Illnesses   Patient presents with    Follow Up     Chief Complaint(s) and History of Present Illness(es)       Follow Up              Laterality: both eyes    Associated symptoms: Negative for double vision, eye pain, headache, photophobia, flashes and floaters              Comments    Patient states started a new medication for MS Mayzent and here today to get a check on health of both eyes being on new medication. Started March 2024 with no visual changes noted. Not taking any ocular medication or AT currently.    Pat Cosby on 7/3/2024, at 10:06 AM

## 2024-07-18 ENCOUNTER — OFFICE VISIT (OUTPATIENT)
Dept: PULMONOLOGY | Facility: CLINIC | Age: 59
End: 2024-07-18
Attending: INTERNAL MEDICINE
Payer: COMMERCIAL

## 2024-07-18 VITALS
OXYGEN SATURATION: 96 % | DIASTOLIC BLOOD PRESSURE: 80 MMHG | HEIGHT: 65 IN | BODY MASS INDEX: 31.99 KG/M2 | WEIGHT: 192 LBS | HEART RATE: 94 BPM | SYSTOLIC BLOOD PRESSURE: 126 MMHG

## 2024-07-18 DIAGNOSIS — R91.8 PULMONARY NODULES: ICD-10-CM

## 2024-07-18 DIAGNOSIS — D86.9 SARCOIDOSIS: Primary | ICD-10-CM

## 2024-07-18 LAB
6 MIN WALK (FT): 1300 FT
6 MIN WALK (M): 396 M

## 2024-07-18 PROCEDURE — 94375 RESPIRATORY FLOW VOLUME LOOP: CPT | Performed by: INTERNAL MEDICINE

## 2024-07-18 PROCEDURE — 94618 PULMONARY STRESS TESTING: CPT | Performed by: INTERNAL MEDICINE

## 2024-07-18 PROCEDURE — 99215 OFFICE O/P EST HI 40 MIN: CPT | Mod: 25 | Performed by: INTERNAL MEDICINE

## 2024-07-18 PROCEDURE — 99207 PR NO CHARGE LOS: CPT

## 2024-07-18 PROCEDURE — 94729 DIFFUSING CAPACITY: CPT | Performed by: INTERNAL MEDICINE

## 2024-07-18 PROCEDURE — 99213 OFFICE O/P EST LOW 20 MIN: CPT | Performed by: INTERNAL MEDICINE

## 2024-07-18 ASSESSMENT — PAIN SCALES - GENERAL: PAINLEVEL: NO PAIN (0)

## 2024-07-18 NOTE — PROGRESS NOTES
Reason for Visit  Shakila Kapoor is a 58 year old year old female who is being seen for Interstitial Lung Disease (ILD) (New sarcoids )    ILD HPI    Shakila Kapoor is a 58-year-old female who is seen today for a diagnosis of possible sarcoidosis.  The patient has a history of multiple sclerosis and was having issues with recurrent UTIs.  This resulted in abdominal CT scan which demonstrated some abdominal adenopathy subsequent chest CT scan showed thoracic mediastinal adenopathy with a few scattered small pulmonary nodules.  This was in February 2024.  She was referred to interventional pulmonology and in April 2024 underwent a bronchoscopy with endobronchial biopsies and fine-needle aspiration of the lymph nodes both of which demonstrated nonnecrotizing granulomas.  She is here today to follow-up and discuss the results of the test.  In general she does well she is on medication for her multiple sclerosis however she denies any respiratory symptoms including shortness of breath or dyspnea on exertion.  She does note and her  notes that she does have an occasional cough but this is fairly intermittent.  Otherwise generally feels well.  She denies fevers chills night sweats or weight loss.  Denies heart palpitations or chest pain.  Denies any eye pain or irritation.  No skin rashes.    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  She lives in a house in [place] with her  and 3 dogs.  She does have her daughter living in the house who smokes however she only smokes in her room.  The patient herself is a lifetime non-smoker.  No other exposures that were identified.      Current Outpatient Medications   Medication Sig Dispense Refill    cephALEXin (KEFLEX) 250 MG capsule Take 250 mg by mouth daily  Prescribed by [facility] for recurrent UTIs      cholecalciferol (VITAMIN D3) 125 mcg (5000 units) capsule Take 1 capsule (125 mcg) by mouth daily 90 capsule 3    estradiol (VAGIFEM) 10 MCG TABS vaginal tablet Place 1 tablet (10 mcg) vaginally twice a week Place 1 tablet (10 mcg) vaginally daily for 14 days, THEN 1 tablet (10 mcg) twice a week for 76 days. 24 tablet 3    lisinopril (ZESTRIL) 10 MG tablet Take 1 tablet (10 mg) by mouth daily 90 tablet 1    LORazepam (ATIVAN) 0.5 MG tablet Take 0.5-1 mg by mouth daily as needed      MAYZENT 1 MG TABS Take 1 mg by mouth daily 30 tablet 11    PARoxetine (PAXIL) 30 MG tablet Take 30 mg by mouth every evening       No current facility-administered medications for this visit.     Allergies   Allergen Reactions    Penicillins      Past Medical History:   Diagnosis Date    Benign essential hypertension 05/25/2022    Benign meningioma (H)     Gait instability     Kidney stone 10/25/2023    Lymphadenopathy     Meningioma (H)     Mixed hyperlipidemia 05/26/2022    Multiple sclerosis (H)     Pulmonary nodules     Sarcoidosis        Past Surgical History:   Procedure Laterality Date    BRONCHOSCOPY RIGID OR FLEXIBLE W/TRANSENDOSCOPIC ENDOBRONCHIAL ULTRASOUND GUIDED N/A 4/18/2024    Procedure: BRONCHOSCOPY, WITH ENDOBRONCHIAL ULTRASOUND;  Surgeon: Ge Parham MD;  Location: [facility]    COLONOSCOPY N/A 3/6/2024    Procedure: COLONOSCOPY, WITH POLYPECTOMY;  Surgeon: Rosa Maria Ruiz MD;  Location:  GI    DISCECTOMY, FUSION CERVICAL ANTERIOR TWO LEVELS, COMBINED N/A 12/20/2022    Procedure: Anterior Cervical 4-6 Fusion with interbody cages and cervical plate;  Surgeon: Be Solorzano MD;  Location:  OR       Social History     Socioeconomic History    Marital status:      Spouse name: Not on file    Number of children: Not on file    Years of education: Not on file    Highest education level: Not on file   Occupational History    Not on file   Tobacco Use     Smoking status: Never    Smokeless tobacco: Never   Vaping Use    Vaping status: Never Used   Substance and Sexual Activity    Alcohol use: Not Currently     Comment: rarely    Drug use: Never    Sexual activity: Yes     Partners: Male     Birth control/protection: None   Other Topics Concern    Not on file   Social History Narrative    Not on file     Social Determinants of Health     Financial Resource Strain: Low Risk  (1/10/2024)    Financial Resource Strain     Within the past 12 months, have you or your family members you live with been unable to get utilities (heat, electricity) when it was really needed?: No   Food Insecurity: Low Risk  (1/10/2024)    Food Insecurity     Within the past 12 months, did you worry that your food would run out before you got money to buy more?: No     Within the past 12 months, did the food you bought just not last and you didn t have money to get more?: No   Transportation Needs: Low Risk  (1/10/2024)    Transportation Needs     Within the past 12 months, has lack of transportation kept you from medical appointments, getting your medicines, non-medical meetings or appointments, work, or from getting things that you need?: No   Physical Activity: Not on file   Stress: Not on file   Social Connections: Not on file   Interpersonal Safety: Not on file   Housing Stability: Low Risk  (1/10/2024)    Housing Stability     Do you have housing? : Yes     Are you worried about losing your housing?: No       Family History   Problem Relation Age of Onset    Bipolar Disorder Mother     Hypertension Father     Lung Cancer Maternal Grandmother     Lung Cancer Maternal Grandfather     Lung Cancer Paternal Grandmother     Hirschsprung's Disease Son     Anxiety Disorder Daughter     Glaucoma No family hx of     Macular Degeneration No family hx of        ROS Pulmonary    A complete ROS was otherwise negative except as noted in the HPI.  Vitals:    07/18/24 1420   BP: 126/80   BP Location: Right  "arm   Patient Position: Sitting   Cuff Size: Adult Regular   Pulse: 94   SpO2: 96%   Weight: 87.1 kg (192 lb)   Height: 1.651 m (5' 5\")     Exam:   GENERAL APPEARANCE: Well developed, well nourished, alert, and in no apparent distress.  NECK: supple, no masses, no thyromegaly.  LYMPHATICS: No significant axillary, cervical, or supraclavicular nodes.  RESP: good air flow throughout, - no crackles, rhonchi or wheezes.  CV: Normal S1, S2, regular rhythm, normal rate, no rub, no murmur,  no gallop, no LE edema.   ABDOMEN:  Bowel sounds normal, soft, nontender, no HSM or masses.   MS: extremities normal- no clubbing, no cyanosis.  PSYCH: mentation appears normal. and affect normal/bright  Results: I have reviewed all imaging, PFTs and other relavent tests, please see below for details, PFT and imaging results were reviewed with the patient.  PFTs: Normal spirometry and diffusion.  Stable from 3 months ago.    Assessment and plan:    58-year-old female with history of multiple sclerosis and probable sarcoidosis involving thoracic lymph nodes and few pulmonary nodules.  Had a discussion with the patient and her  regarding the diagnosis and the implications.  At this point she has mainly lymph node involvement with only minimal parenchymal involvement and is essentially asymptomatic with normal pulmonary function test.  We will do screening labs and EKG today however I do not have any concern for extrapulmonary sarcoidosis at this time.  She does follow with in the neurology clinic and if there is any concern for neurosarcoid they can evaluate her for this.  Otherwise I will follow the patient along I will see her back in 6 months with pulmonary function test I have instructed her to call sooner with any new changes in her breathing or any new symptoms.      I spent 45 minutes on the date of the encounter doing chart review, history and exam, interpretation of any new PFTs and imaging, documentation and further " "activities as noted above.    The longitudinal plan of care for the diagnosis(es)/condition(s) as documented were addressed during this visit. Due to the added complexity in care, I will continue to support Shakila in the subsequent management and with ongoing continuity of care.      CBC   Recent Labs   Lab Test 05/16/24  0851 03/21/24  1459   RBC 4.85 4.91   HGB 15.2 15.0   HCT 44.7 45.2    306       Basic Metabolic Panel  Recent Labs   Lab Test 05/16/24  0851 03/21/24  1459    141   POTASSIUM 4.6 4.7   CHLORIDE 107 104   CO2 26 26   BUN 11.7 15.6   GLC 84 95   ANOOP 9.3 9.7       INR  No results for input(s): \"INR\" in the last 54403 hours.    PFT      Latest Ref Rng & Units 7/18/2024    12:42 PM   PFT   FVC L 2.89  P   FEV1 L 1.99  P   FVC% % 92  P   FEV1% % 79  P      P Preliminary result           CC:      "

## 2024-07-18 NOTE — LETTER
7/18/2024      Shakila Kapoor  [address]  [place] [address]      Dear Colleague,    Thank you for referring your patient, Shakila Kapoor, to the [facility] [place]. Please see a copy of my visit note below.    Reason for Visit  Shakila Kapoor is a 58 year old year old female who is being seen for Interstitial Lung Disease (ILD) (New sarcoids )    ILD HPI    Shakila Kapoor is a 58-year-old female who is seen today for a diagnosis of possible sarcoidosis.  The patient has a history of multiple sclerosis and was having issues with recurrent UTIs.  This resulted in abdominal CT scan which demonstrated some abdominal adenopathy subsequent chest CT scan showed thoracic mediastinal adenopathy with a few scattered small pulmonary nodules.  This was in February 2024.  She was referred to interventional pulmonology and in April 2024 underwent a bronchoscopy with endobronchial biopsies and fine-needle aspiration of the lymph nodes both of which demonstrated nonnecrotizing granulomas.  She is here today to follow-up and discuss the results of the test.  In general she does well she is on medication for her multiple sclerosis however she denies any respiratory symptoms including shortness of breath or dyspnea on exertion.  She does note and her  notes that she does have an occasional cough but this is fairly intermittent.  Otherwise generally feels well.  She denies fevers chills night sweats or weight loss.  Denies heart palpitations or chest pain.  Denies any eye pain or irritation.  No skin rashes.    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  She lives in a house in [place] with her  and 3 dogs.  She does have her daughter living in the  house who smokes however she only smokes in her room.  The patient herself is a lifetime non-smoker.  No other exposures that were identified.      Current Outpatient Medications   Medication Sig Dispense Refill     cephALEXin (KEFLEX) 250 MG capsule Take 250 mg by mouth daily Prescribed by [facility] for recurrent UTIs       cholecalciferol (VITAMIN D3) 125 mcg (5000 units) capsule Take 1 capsule (125 mcg) by mouth daily 90 capsule 3     estradiol (VAGIFEM) 10 MCG TABS vaginal tablet Place 1 tablet (10 mcg) vaginally twice a week Place 1 tablet (10 mcg) vaginally daily for 14 days, THEN 1 tablet (10 mcg) twice a week for 76 days. 24 tablet 3     lisinopril (ZESTRIL) 10 MG tablet Take 1 tablet (10 mg) by mouth daily 90 tablet 1     LORazepam (ATIVAN) 0.5 MG tablet Take 0.5-1 mg by mouth daily as needed       MAYZENT 1 MG TABS Take 1 mg by mouth daily 30 tablet 11     PARoxetine (PAXIL) 30 MG tablet Take 30 mg by mouth every evening       No current facility-administered medications for this visit.     Allergies   Allergen Reactions     Penicillins      Past Medical History:   Diagnosis Date     Benign essential hypertension 05/25/2022     Benign meningioma (H)      Gait instability      Kidney stone 10/25/2023     Lymphadenopathy      Meningioma (H)      Mixed hyperlipidemia 05/26/2022     Multiple sclerosis (H)      Pulmonary nodules      Sarcoidosis        Past Surgical History:   Procedure Laterality Date     BRONCHOSCOPY RIGID OR FLEXIBLE W/TRANSENDOSCOPIC ENDOBRONCHIAL ULTRASOUND GUIDED N/A 4/18/2024    Procedure: BRONCHOSCOPY, WITH ENDOBRONCHIAL ULTRASOUND;  Surgeon: Ge Parham MD;  Location: [facility] OR     COLONOSCOPY N/A 3/6/2024    Procedure: COLONOSCOPY, WITH POLYPECTOMY;  Surgeon: Rosa Maria Ruiz MD;  [facility]     DISCECTOMY, FUSION CERVICAL ANTERIOR TWO LEVELS, COMBINED N/A 12/20/2022    Procedure: Anterior Cervical 4-6 Fusion with interbody cages and cervical plate;  Surgeon: Jeanine  MD Be;  Location:  OR       Social History     Socioeconomic History     Marital status:      Spouse name: Not on file     Number of children: Not on file     Years of education: Not on file     Highest education level: Not on file   Occupational History     Not on file   Tobacco Use     Smoking status: Never     Smokeless tobacco: Never   Vaping Use     Vaping status: Never Used   Substance and Sexual Activity     Alcohol use: Not Currently     Comment: rarely     Drug use: Never     Sexual activity: Yes     Partners: Male     Birth control/protection: None   Other Topics Concern     Not on file   Social History Narrative     Not on file     Social Determinants of Health     Financial Resource Strain: Low Risk  (1/10/2024)    Financial Resource Strain      Within the past 12 months, have you or your family members you live with been unable to get utilities (heat, electricity) when it was really needed?: No   Food Insecurity: Low Risk  (1/10/2024)    Food Insecurity      Within the past 12 months, did you worry that your food would run out before you got money to buy more?: No      Within the past 12 months, did the food you bought just not last and you didn t have money to get more?: No   Transportation Needs: Low Risk  (1/10/2024)    Transportation Needs      Within the past 12 months, has lack of transportation kept you from medical appointments, getting your medicines, non-medical meetings or appointments, work, or from getting things that you need?: No   Physical Activity: Not on file   Stress: Not on file   Social Connections: Not on file   Interpersonal Safety: Not on file   Housing Stability: Low Risk  (1/10/2024)    Housing Stability      Do you have housing? : Yes      Are you worried about losing your housing?: No       Family History   Problem Relation Age of Onset     Bipolar Disorder Mother      Hypertension Father      Lung Cancer Maternal Grandmother      Lung Cancer Maternal  "Grandfather      Lung Cancer Paternal Grandmother      Hirschsprung's Disease Son      Anxiety Disorder Daughter      Glaucoma No family hx of      Macular Degeneration No family hx of        ROS Pulmonary    A complete ROS was otherwise negative except as noted in the HPI.  Vitals:    07/18/24 1420   BP: 126/80   BP Location: Right arm   Patient Position: Sitting   Cuff Size: Adult Regular   Pulse: 94   SpO2: 96%   Weight: 87.1 kg (192 lb)   Height: 1.651 m (5' 5\")     Exam:   GENERAL APPEARANCE: Well developed, well nourished, alert, and in no apparent distress.  NECK: supple, no masses, no thyromegaly.  LYMPHATICS: No significant axillary, cervical, or supraclavicular nodes.  RESP: good air flow throughout, - no crackles, rhonchi or wheezes.  CV: Normal S1, S2, regular rhythm, normal rate, no rub, no murmur,  no gallop, no LE edema.   ABDOMEN:  Bowel sounds normal, soft, nontender, no HSM or masses.   MS: extremities normal- no clubbing, no cyanosis.  PSYCH: mentation appears normal. and affect normal/bright  Results: I have reviewed all imaging, PFTs and other relavent tests, please see below for details, PFT and imaging results were reviewed with the patient.  PFTs: Normal spirometry and diffusion.  Stable from 3 months ago.    Assessment and plan:    58-year-old female with history of multiple sclerosis and probable sarcoidosis involving thoracic lymph nodes and few pulmonary nodules.  Had a discussion with the patient and her  regarding the diagnosis and the implications.  At this point she has mainly lymph node involvement with only minimal parenchymal involvement and is essentially asymptomatic with normal pulmonary function test.  We will do screening labs and EKG today however I do not have any concern for extrapulmonary sarcoidosis at this time.  She does follow with in the neurology clinic and if there is any concern for neurosarcoid they can evaluate her for this.  Otherwise I will follow the " "patient along I will see her back in 6 months with pulmonary function test I have instructed her to call sooner with any new changes in her breathing or any new symptoms.      I spent 45 minutes on the date of the encounter doing chart review, history and exam, interpretation of any new PFTs and imaging, documentation and further activities as noted above.    The longitudinal plan of care for the diagnosis(es)/condition(s) as documented were addressed during this visit. Due to the added complexity in care, I will continue to support Shakila in the subsequent management and with ongoing continuity of care.      CBC   Recent Labs   Lab Test 05/16/24  0851 03/21/24  1459   RBC 4.85 4.91   HGB 15.2 15.0   HCT 44.7 45.2    306       Basic Metabolic Panel  Recent Labs   Lab Test 05/16/24  0851 03/21/24  1459    141   POTASSIUM 4.6 4.7   CHLORIDE 107 104   CO2 26 26   BUN 11.7 15.6   GLC 84 95   ANOOP 9.3 9.7       INR  No results for input(s): \"INR\" in the last 65247 hours.    PFT      Latest Ref Rng & Units 7/18/2024    12:42 PM   PFT   FVC L 2.89  P   FEV1 L 1.99  P   FVC% % 92  P   FEV1% % 79  P      P Preliminary result           CC:        Again, thank you for allowing me to participate in the care of your patient.        Sincerely,        David Morris Perlman, MD  "

## 2024-07-18 NOTE — NURSING NOTE
Chief Complaint   Patient presents with    Interstitial Lung Disease (ILD)     New sarcoids      Vitals were taken and medications were reconciled.    Hayley Orellana RMA  2:24 PM

## 2024-07-19 ENCOUNTER — APPOINTMENT (OUTPATIENT)
Dept: LAB | Facility: CLINIC | Age: 59
End: 2024-07-19
Attending: INTERNAL MEDICINE
Payer: COMMERCIAL

## 2024-07-19 DIAGNOSIS — D86.9 SARCOIDOSIS: ICD-10-CM

## 2024-07-19 LAB
ALBUMIN SERPL BCG-MCNC: 4.2 G/DL (ref 3.5–5.2)
ALP SERPL-CCNC: 66 U/L (ref 40–150)
ALT SERPL W P-5'-P-CCNC: 21 U/L (ref 0–50)
ANION GAP SERPL CALCULATED.3IONS-SCNC: 9 MMOL/L (ref 7–15)
AST SERPL W P-5'-P-CCNC: 21 U/L (ref 0–45)
BASOPHILS # BLD AUTO: 0 10E3/UL (ref 0–0.2)
BASOPHILS NFR BLD AUTO: 1 %
BILIRUB SERPL-MCNC: 0.4 MG/DL
BUN SERPL-MCNC: 9 MG/DL (ref 6–20)
CALCIUM SERPL-MCNC: 9.2 MG/DL (ref 8.8–10.4)
CHLORIDE SERPL-SCNC: 108 MMOL/L (ref 98–107)
CREAT SERPL-MCNC: 0.76 MG/DL (ref 0.51–0.95)
CRP SERPL-MCNC: <3 MG/L
DLCOUNC-%PRED-PRE: 119 %
DLCOUNC-PRE: 24.47 ML/MIN/MMHG
DLCOUNC-PRED: 20.54 ML/MIN/MMHG
EGFRCR SERPLBLD CKD-EPI 2021: >90 ML/MIN/1.73M2
EOSINOPHIL # BLD AUTO: 0.1 10E3/UL (ref 0–0.7)
EOSINOPHIL NFR BLD AUTO: 2 %
ERV-%PRED-PRE: 49 %
ERV-PRE: 0.57 L
ERV-PRED: 1.16 L
ERYTHROCYTE [DISTWIDTH] IN BLOOD BY AUTOMATED COUNT: 13 % (ref 10–15)
EXPTIME-PRE: 6.2 SEC
FEF2575-%PRED-PRE: 52 %
FEF2575-PRE: 1.24 L/SEC
FEF2575-PRED: 2.34 L/SEC
FEFMAX-%PRED-PRE: 76 %
FEFMAX-PRE: 5.07 L/SEC
FEFMAX-PRED: 6.61 L/SEC
FEV1-%PRED-PRE: 79 %
FEV1-PRE: 1.99 L
FEV1FEV6-PRE: 69 %
FEV1FEV6-PRED: 81 %
FEV1FVC-PRE: 69 %
FEV1FVC-PRED: 80 %
FEV1SVC-PRE: 72 %
FEV1SVC-PRED: 73 %
FIFMAX-PRE: 3.59 L/SEC
FVC-%PRED-PRE: 92 %
FVC-PRE: 2.89 L
FVC-PRED: 3.11 L
GLUCOSE SERPL-MCNC: 121 MG/DL (ref 70–99)
HCO3 SERPL-SCNC: 25 MMOL/L (ref 22–29)
HCT VFR BLD AUTO: 42.9 % (ref 35–47)
HGB BLD-MCNC: 14.7 G/DL (ref 11.7–15.7)
IC-%PRED-PRE: 94 %
IC-PRE: 2.2 L
IC-PRED: 2.32 L
IGA SERPL-MCNC: 108 MG/DL (ref 84–499)
IGG SERPL-MCNC: 894 MG/DL (ref 610–1616)
IGM SERPL-MCNC: 98 MG/DL (ref 35–242)
IMM GRANULOCYTES # BLD: 0 10E3/UL
IMM GRANULOCYTES NFR BLD: 1 %
LYMPHOCYTES # BLD AUTO: 0.3 10E3/UL (ref 0.8–5.3)
LYMPHOCYTES NFR BLD AUTO: 6 %
MCH RBC QN AUTO: 31.5 PG (ref 26.5–33)
MCHC RBC AUTO-ENTMCNC: 34.3 G/DL (ref 31.5–36.5)
MCV RBC AUTO: 92 FL (ref 78–100)
MONOCYTES # BLD AUTO: 0.4 10E3/UL (ref 0–1.3)
MONOCYTES NFR BLD AUTO: 8 %
NEUTROPHILS # BLD AUTO: 4.6 10E3/UL (ref 1.6–8.3)
NEUTROPHILS NFR BLD AUTO: 82 %
NRBC # BLD AUTO: 0 10E3/UL
NRBC BLD AUTO-RTO: 0 /100
NT-PROBNP SERPL-MCNC: <36 PG/ML (ref 0–900)
PLATELET # BLD AUTO: 278 10E3/UL (ref 150–450)
POTASSIUM SERPL-SCNC: 3.7 MMOL/L (ref 3.4–5.3)
PROT SERPL-MCNC: 6.7 G/DL (ref 6.4–8.3)
RBC # BLD AUTO: 4.67 10E6/UL (ref 3.8–5.2)
SODIUM SERPL-SCNC: 142 MMOL/L (ref 135–145)
VA-%PRED-PRE: 87 %
VA-PRE: 4.27 L
VC-%PRED-PRE: 81 %
VC-PRE: 2.77 L
VC-PRED: 3.39 L
VIT D+METAB SERPL-MCNC: 23 NG/ML (ref 20–50)
WBC # BLD AUTO: 5.6 10E3/UL (ref 4–11)

## 2024-07-19 PROCEDURE — 82784 ASSAY IGA/IGD/IGG/IGM EACH: CPT | Performed by: INTERNAL MEDICINE

## 2024-07-19 PROCEDURE — 86140 C-REACTIVE PROTEIN: CPT | Performed by: PATHOLOGY

## 2024-07-19 PROCEDURE — 86606 ASPERGILLUS ANTIBODY: CPT | Mod: 90 | Performed by: PATHOLOGY

## 2024-07-19 PROCEDURE — 82164 ANGIOTENSIN I ENZYME TEST: CPT | Mod: 90 | Performed by: PATHOLOGY

## 2024-07-19 PROCEDURE — 83880 ASSAY OF NATRIURETIC PEPTIDE: CPT | Performed by: PATHOLOGY

## 2024-07-19 PROCEDURE — 82652 VIT D 1 25-DIHYDROXY: CPT | Performed by: INTERNAL MEDICINE

## 2024-07-19 PROCEDURE — 86481 TB AG RESPONSE T-CELL SUSP: CPT | Performed by: INTERNAL MEDICINE

## 2024-07-19 PROCEDURE — 82306 VITAMIN D 25 HYDROXY: CPT | Performed by: INTERNAL MEDICINE

## 2024-07-19 PROCEDURE — 86635 COCCIDIOIDES ANTIBODY: CPT | Mod: 90 | Performed by: PATHOLOGY

## 2024-07-19 PROCEDURE — 99000 SPECIMEN HANDLING OFFICE-LAB: CPT | Performed by: PATHOLOGY

## 2024-07-19 PROCEDURE — 86612 BLASTOMYCES ANTIBODY: CPT | Mod: 90 | Performed by: PATHOLOGY

## 2024-07-19 PROCEDURE — 85025 COMPLETE CBC W/AUTO DIFF WBC: CPT | Performed by: PATHOLOGY

## 2024-07-19 PROCEDURE — 86698 HISTOPLASMA ANTIBODY: CPT | Mod: 90 | Performed by: PATHOLOGY

## 2024-07-19 PROCEDURE — 80053 COMPREHEN METABOLIC PANEL: CPT | Performed by: PATHOLOGY

## 2024-07-19 PROCEDURE — 36415 COLL VENOUS BLD VENIPUNCTURE: CPT | Performed by: PATHOLOGY

## 2024-07-19 PROCEDURE — 93000 ELECTROCARDIOGRAM COMPLETE: CPT | Performed by: INTERNAL MEDICINE

## 2024-07-19 PROCEDURE — 83520 IMMUNOASSAY QUANT NOS NONAB: CPT | Mod: 90 | Performed by: PATHOLOGY

## 2024-07-20 LAB — ACE SERPL-CCNC: <10 U/L

## 2024-07-21 LAB
QUANTIFERON MITOGEN: 10 IU/ML
QUANTIFERON NIL TUBE: 0.02 IU/ML
QUANTIFERON TB1 TUBE: 0.02 IU/ML
QUANTIFERON TB2 TUBE: 0.02
SOL IL2 RECEP SERPL-MCNC: 1915.3 PG/ML

## 2024-07-22 ENCOUNTER — PRE VISIT (OUTPATIENT)
Dept: UROLOGY | Facility: CLINIC | Age: 59
End: 2024-07-22
Payer: COMMERCIAL

## 2024-07-22 LAB
ASPERGILLUS AB TITR SER CF: NORMAL {TITER}
ATRIAL RATE - MUSE: 88 BPM
B DERMAT AB SER-ACNC: 0.4 IV
COCCIDIOIDES AB TITR SER CF: NORMAL {TITER}
DIASTOLIC BLOOD PRESSURE - MUSE: NORMAL MMHG
GAMMA INTERFERON BACKGROUND BLD IA-ACNC: 0.02 IU/ML
H CAPSUL MYC AB TITR SER CF: NORMAL {TITER}
H CAPSUL YST AB TITR SER CF: NORMAL {TITER}
INTERPRETATION ECG - MUSE: NORMAL
M TB IFN-G BLD-IMP: NEGATIVE
M TB IFN-G CD4+ BCKGRND COR BLD-ACNC: 9.98 IU/ML
MITOGEN IGNF BCKGRD COR BLD-ACNC: 0 IU/ML
MITOGEN IGNF BCKGRD COR BLD-ACNC: 0 IU/ML
P AXIS - MUSE: 49 DEGREES
PR INTERVAL - MUSE: 156 MS
QRS DURATION - MUSE: 78 MS
QT - MUSE: 336 MS
QTC - MUSE: 406 MS
R AXIS - MUSE: 11 DEGREES
SYSTOLIC BLOOD PRESSURE - MUSE: NORMAL MMHG
T AXIS - MUSE: 61 DEGREES
VENTRICULAR RATE- MUSE: 88 BPM

## 2024-07-22 NOTE — TELEPHONE ENCOUNTER
Reason for visit: urinary urgency       Relevant information: N/A    Records/imaging/labs/orders: all records available    Pt called: No need for a call    At Rooming: standard procedure    Nancy Garsia  7/22/2024  1:43 PM

## 2024-07-24 LAB — 1,25(OH)2D SERPL-MCNC: 82.4 PG/ML (ref 19.9–79.3)

## 2024-07-29 SDOH — HEALTH STABILITY: PHYSICAL HEALTH: ON AVERAGE, HOW MANY MINUTES DO YOU ENGAGE IN EXERCISE AT THIS LEVEL?: 20 MIN

## 2024-07-29 SDOH — HEALTH STABILITY: PHYSICAL HEALTH: ON AVERAGE, HOW MANY DAYS PER WEEK DO YOU ENGAGE IN MODERATE TO STRENUOUS EXERCISE (LIKE A BRISK WALK)?: 5 DAYS

## 2024-07-29 ASSESSMENT — SOCIAL DETERMINANTS OF HEALTH (SDOH): HOW OFTEN DO YOU GET TOGETHER WITH FRIENDS OR RELATIVES?: ONCE A WEEK

## 2024-07-30 ENCOUNTER — OFFICE VISIT (OUTPATIENT)
Dept: UROLOGY | Facility: CLINIC | Age: 59
End: 2024-07-30
Attending: PSYCHIATRY & NEUROLOGY
Payer: COMMERCIAL

## 2024-07-30 ENCOUNTER — PRE VISIT (OUTPATIENT)
Dept: UROLOGY | Facility: CLINIC | Age: 59
End: 2024-07-30

## 2024-07-30 VITALS — HEART RATE: 85 BPM | DIASTOLIC BLOOD PRESSURE: 77 MMHG | SYSTOLIC BLOOD PRESSURE: 119 MMHG

## 2024-07-30 DIAGNOSIS — R35.0 URINARY FREQUENCY: ICD-10-CM

## 2024-07-30 DIAGNOSIS — R39.15 URINARY URGENCY: Primary | ICD-10-CM

## 2024-07-30 DIAGNOSIS — R33.9 INCOMPLETE BLADDER EMPTYING: ICD-10-CM

## 2024-07-30 DIAGNOSIS — Z87.440 HISTORY OF RECURRENT UTIS: ICD-10-CM

## 2024-07-30 DIAGNOSIS — G35 MULTIPLE SCLEROSIS (H): ICD-10-CM

## 2024-07-30 PROCEDURE — 99204 OFFICE O/P NEW MOD 45 MIN: CPT | Performed by: PHYSICIAN ASSISTANT

## 2024-07-30 ASSESSMENT — PAIN SCALES - GENERAL: PAINLEVEL: NO PAIN (0)

## 2024-07-30 NOTE — PROGRESS NOTES
Name: Shakila Kapoor    MRN: [id number]   YOB: 1965                 Chief Complaint:   Urinary urgency         Assessment and Plan:   58 year old female with MS and sarcoidosis and urologic issues characterized by urinary frequency, urgency, mixed incontinence, incomplete bladder emptying, and recurrent UTI. Was on oxybutynin for a few years with inadequate symptom control. This was stopped in the Fall of 2023 when she started having recurrent UTIs and found to have a PVR of 300  mL. She was taught ISC by outside urologist but was unable to perform this herself. PVR today remains elevated at 257 mL. She underwent negative cystoscopy and CT A/P with outside urologist. She has not had any further UTIs on suppressive cephalexin and Vagifem, and urgency has been better since stopping oxybutynin. However, she continues to have frequency, incontinence, and the aforementioned incomplete bladder emptying. We discussed the role for urodynamics studies in further evaluating her lower urinary tract function / dysfunction and how this can help to guide further treatments. She is amenable to proceed. Also discussed lifestyle modifications for general bladder health.    Plan:  -Urodynamics next available.  -Follow up with Michaelle Orellana CNP to review results and determine further treatment.   -Stay well hydrated.  -Double void and take time on the toilet to promote complete bladder emptying.     Leslie Avina PA-C  July 30, 2024          History of Present Illness:   Shakila Kapoor is a 58 year old female with Multiple Sclerosis and sarcoidosis seen in consultation from Dr. Harris for evaluation of urinary urgency. She reports long-standing urinary frequency, urgency, and incontinence dating back to age 21 when she was diagnosed with possible/probable MS. Since then, these issues have only gotten worse and her MS diagnosis was confirmed. Last Fall of 2023, she developed recurrent UTIs. Was evaluated by  [facility] with negative cystoscopy and CT A/P. She was started on suppressive cephalexin and Vagifem tablets. Also found to have incomplete bladder emptying ( mL) and was taught ISC but was unable to perform this herself. She had been taking oxybutynin for a few years which her neurologist recommended she stop in case it was contributing to urinary retention and UTIs.     Over the past few months, she feels that the urgency has been somewhat improved, but she continues to have bothersome frequency, some incontinence, and nocturia 2x per night. She has not had any recent UTIs or gross hematuria. In general, she does feel to empty her bladder without straining to void. Her bowels are mostly regular, sometimes has loose stools. 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         Past Medical History:     Past Medical History:   Diagnosis Date    Benign essential hypertension 05/25/2022    Benign meningioma (H)     Gait instability     Kidney stone 10/25/2023    Lymphadenopathy     Meningioma (H)     Mixed hyperlipidemia 05/26/2022    Multiple sclerosis (H)     Pulmonary nodules     Sarcoidosis             Past Surgical History:     Past Surgical History:   Procedure Laterality Date    BRONCHOSCOPY RIGID OR FLEXIBLE W/TRANSENDOSCOPIC ENDOBRONCHIAL ULTRASOUND GUIDED N/A 4/18/2024    Procedure: BRONCHOSCOPY, WITH ENDOBRONCHIAL ULTRASOUND;  Surgeon: Ge Parham MD;  Location: [facility] [place] OR    COLONOSCOPY N/A 3/6/2024    Procedure: COLONOSCOPY, WITH POLYPECTOMY;  Surgeon: Rosa Maria Ruiz MD;  Location:  GI    DISCECTOMY, FUSION CERVICAL ANTERIOR TWO LEVELS, COMBINED N/A 12/20/2022    Procedure: Anterior Cervical 4-6 Fusion with interbody cages and cervical plate;  Surgeon: Be Solorzano MD;  Location:  OR            Social History:     Social History     Tobacco Use    Smoking status: Never    Smokeless tobacco:  Never   Substance Use Topics    Alcohol use: Not Currently     Comment: rarely            Family History:     Family History   Problem Relation Age of Onset    Bipolar Disorder Mother     Hypertension Father     Lung Cancer Maternal Grandmother     Lung Cancer Maternal Grandfather     Lung Cancer Paternal Grandmother     Hirschsprung's Disease Son     Anxiety Disorder Daughter     Glaucoma No family hx of     Macular Degeneration No family hx of             Allergies:     Allergies   Allergen Reactions    Penicillins             Medications:     Current Outpatient Medications   Medication Sig Dispense Refill    cephALEXin (KEFLEX) 250 MG capsule Take 250 mg by mouth daily Prescribed by [facility] for recurrent UTIs      cholecalciferol (VITAMIN D3) 125 mcg (5000 units) capsule Take 1 capsule (125 mcg) by mouth daily 90 capsule 3    estradiol (VAGIFEM) 10 MCG TABS vaginal tablet Place 1 tablet (10 mcg) vaginally twice a week Place 1 tablet (10 mcg) vaginally daily for 14 days, THEN 1 tablet (10 mcg) twice a week for 76 days. 24 tablet 3    lisinopril (ZESTRIL) 10 MG tablet Take 1 tablet (10 mg) by mouth daily 90 tablet 1    LORazepam (ATIVAN) 0.5 MG tablet Take 0.5-1 mg by mouth daily as needed      MAYZENT 1 MG TABS Take 1 mg by mouth daily 30 tablet 11    PARoxetine (PAXIL) 30 MG tablet Take 30 mg by mouth every evening       No current facility-administered medications for this visit.            Physical Exam:   /77   Pulse 85   GENERAL: alert and no distress  EYES: Eyes grossly normal to inspection.  No discharge or erythema, or obvious scleral/conjunctival abnormalities.  RESP: No audible wheeze, cough, or visible cyanosis.    SKIN: Visible skin clear. No significant rash, abnormal pigmentation or lesions.  NEURO: Cranial nerves grossly intact.  Mentation and speech appropriate for age.  PSYCH: Appropriate affect, tone, and pace of words    PVR: 257 mL by bladder scan       Labs:            Lab Results    Component Value Date    CULTURE No Growth 04/18/2024    CULTURE No Growth 04/18/2024    CULTURE 10,000-50,000 CFU/mL Mixture of Urogenital Joann 03/21/2024    CULTURE >100,000 CFU/mL Klebsiella pneumoniae 10/08/2023    CULTURE 10,000-50,000 CFU/mL Klebsiella pneumoniae 09/27/2023       Creatinine   Date Value Ref Range Status   07/19/2024 0.76 0.51 - 0.95 mg/dL Final       Lab Results   Component Value Date    A1C 5.1 05/25/2022       Imaging:    CT ABDOMEN PELVIS W CONTRAST, 2/27/2024     FINDINGS:     Lower thorax: Multiple centrally hypoattenuating hilar lymph nodes are  visualized in the bilateral inferior kleby measuring approximately 9-10  mm in diameter (series 3 images 1, 7, and 20) partially visualized.  Basilar areas of intralobular septal thickening. Otherwise  unremarkable lower chest.     Liver: There are two large hepatic cysts in segment 4A and B. Multiple  scattered additional hepatic hypoattenuating observations are too  small to fully characterize and likely additional hepatic cysts. No  intrahepatic biliary ductal dilation.     Biliary System: Cholelithiasis without cholecystitis.     Pancreas: No mass or pancreatic ductal dilation.     Adrenal glands: No mass or nodules     Spleen: Normal.     Kidneys: No suspicious mass, obstructing calculus or hydronephrosis.     Gastrointestinal tract :Normal appendix. Normal caliber small bowel.   Focal area of luminal thickening with questionable wall thickening in  the upper rectum (series 3 image 330).     Mesentery/peritoneum/retroperitoneum: No mass. No free fluid or air.     Lymph nodes: Upper abdominal lymphadenopathy with 11 and 9 mm in short  axis portacaval lymph nodes (series 3 images 104 and 118), para-aortic  lymph nodes measuring 13, 13, and 11 mm in short axis (series 3 images  141, 152, 170). There are prominent lymph nodes adjacent to the  bilateral gonadal veins, although not enlarged by size criteria.  Prominent bilateral external iliac  chain lymph nodes.     Vasculature: Patent major abdominal vasculature.     Pelvis: Thickening of the urinary bladder with mildly enhancing  mucosa.  Somewhat thickened endometrium, nonspecific. No definite  adnexal lesions being.     Osseous structures: No aggressive or acute osseous lesion.  Dense  osseous lesion in the left iliac crest with attenuation of both the  pelvis and Hounsfield units, likely a bone island (series 3 image  305). Tiny lucent foci in the bilateral iliac bones are nonspecific  and usually related to heterogeneous fatty bone marrow. Grade 1  anterolisthesis of L5-S1 with bilateral pars defects.      Soft tissues: Within normal limits.                                                                      IMPRESSION:   1. Thoracic and abdominal lymphadenopathy as described above. Outside  imaging mentioned in the indication is not available for direct  comparison. Although this can be seen in systemic inflammatory and  infectious etiologies, this is primarily concerning for malignancy  without convincing detection of a primary site.  1a. Recommend dedicated chest CT with contrast to evaluate extent of  thoracic lymphadenopathy.  1b. Given the distribution of lymphadenopathy and somewhat thickened  endometrium in a presumed post menopausal female, gynecological  malignancy should be excluded. Consider further evaluation with  dedicated pelvic ultrasound and correlation with any abnormal  postmenopausal bleeding.  2. Focal area of thickening in the rectum, with nonspecific  appearance. Although this could be an area of peristalsis, a small  mass cannot be ruled out. Consider direct visualization with  rectosigmoidoscopy/colonoscopy.  3. Thickening of the urinary bladder with enhancing mucosa,  nonspecific and can be seen in setting of cystitis. Recommend  correlation with clinical presentation and urinalysis.  4. Cholelithiasis without evidence of acute cholecystitis.       40 minutes spent on the  date of the encounter doing chart review, review of outside records, review of test results, interpretation of tests, patient visit, and documentation

## 2024-07-30 NOTE — PATIENT INSTRUCTIONS
UROLOGY CLINIC VISIT PATIENT INSTRUCTIONS    Schedule urodynamics next available, then a visit with Michaelle Orellana CNP to review results.    URODYNAMIC TESTING    Where should I go for this test?  The procedure is performed at:     [facility]   [address] [place] [id number]   Floor 4    If you have questions about your test, please call our nurse triage line at [phone], option #2. If you need to cancel or reschedule your test for any reason, please notify us as soon as possible.    Please check in approximately 15 minutes prior to your procedure time.      What is urodynamic testing?   Urodynamic testing refers to a group of tests used to assess bladder function by measuring various aspects of urine storage and emptying. The test takes about 75 minutes. For most patients, the test is not painful.     How should I get ready for this test?  Please try to arrive with a comfortably full bladder if possible because you will be asked to try and urinate prior to your study.  If you received a bladder diary, please complete this prior to your urodynamic test and bring it with you to your appointment. A bladder diary measures how much fluid you are drinking and how often and how much you are urinating. You can also record any urinary leakage that may have occurred and what you were doing when you leaked.    If you have chronic constipation, please take stool softeners for two days before your test.    What happens during the test?  You will first be asked to empty your bladder in a private restroom into a special toilet called a uroflow machine which measures the rate of urine flow.  A nurse will then place a very small tube (called a catheter) into your bladder. This drains any urine left over after urinating and also measures the pressures inside of your bladder during your test. Another small catheter will then be placed into your rectum to measure  abdominal pressures. Two small sticky patches will be placed on the skin near your anus to measure pelvic floor function.   We will then instill contrast dye into your bladder through the bladder catheter. The contrast is very dense and will allow us to take x-ray pictures of your bladder intermittently during your test.  You will be asked to tell us when you first start to feel like your bladder is filling up, when you have moderate urgency to urinate, when you have very strong urgency to urinate and finally when you feel that your bladder is full. Once you feel full you will be asked to try and urinate.    You may be asked to cough or bear down several times during your procedure. The provider running your study will be looking for urine leakage during this time.      What happens after the test?  The provider running your urodynamic study will share the results of your test on the day of your procedure or very soon after.  After your test, you may go about your day as normal. You may notice some blood in your urine for a couple of days which should clear up on its own. You may also feel a more urgent need to use the toilet or you may need to go more often - this is due to having a catheter placed and should resolve on its own in a few days.      If you have any issues, questions or concerns in the meantime, do not hesitate to contact us at [phone] or via [employer].     It was a pleasure meeting with you today.  Thank you for allowing me and my team the privilege of caring for you today.  YOU are the reason we are here, and I truly hope we provided you with the excellent service you deserve.  Please let us know if there is anything else we can do for you so that we can be sure you are leaving completely satisfied with your care experience.

## 2024-07-31 ENCOUNTER — OFFICE VISIT (OUTPATIENT)
Dept: FAMILY MEDICINE | Facility: CLINIC | Age: 59
End: 2024-07-31
Attending: NURSE PRACTITIONER
Payer: COMMERCIAL

## 2024-07-31 VITALS
TEMPERATURE: 99 F | HEART RATE: 83 BPM | WEIGHT: 194.2 LBS | OXYGEN SATURATION: 96 % | DIASTOLIC BLOOD PRESSURE: 60 MMHG | BODY MASS INDEX: 32.36 KG/M2 | RESPIRATION RATE: 20 BRPM | SYSTOLIC BLOOD PRESSURE: 110 MMHG | HEIGHT: 65 IN

## 2024-07-31 DIAGNOSIS — G35 MULTIPLE SCLEROSIS (H): ICD-10-CM

## 2024-07-31 DIAGNOSIS — I10 BENIGN ESSENTIAL HYPERTENSION: ICD-10-CM

## 2024-07-31 DIAGNOSIS — Z00.00 ROUTINE GENERAL MEDICAL EXAMINATION AT A HEALTH CARE FACILITY: Primary | ICD-10-CM

## 2024-07-31 DIAGNOSIS — D86.9 SARCOIDOSIS: ICD-10-CM

## 2024-07-31 DIAGNOSIS — E78.2 MIXED HYPERLIPIDEMIA: ICD-10-CM

## 2024-07-31 LAB
CHOLEST SERPL-MCNC: 239 MG/DL
FASTING STATUS PATIENT QL REPORTED: YES
HBA1C MFR BLD: 4.9 % (ref 0–5.6)
HBV SURFACE AB SERPL IA-ACNC: <3.5 M[IU]/ML
HBV SURFACE AB SERPL IA-ACNC: NONREACTIVE M[IU]/ML
HDLC SERPL-MCNC: 46 MG/DL
LDLC SERPL CALC-MCNC: 166 MG/DL
NONHDLC SERPL-MCNC: 193 MG/DL
TRIGL SERPL-MCNC: 137 MG/DL

## 2024-07-31 PROCEDURE — 90480 ADMN SARSCOV2 VAC 1/ONLY CMP: CPT | Performed by: NURSE PRACTITIONER

## 2024-07-31 PROCEDURE — 80061 LIPID PANEL: CPT | Performed by: NURSE PRACTITIONER

## 2024-07-31 PROCEDURE — 91320 SARSCV2 VAC 30MCG TRS-SUC IM: CPT | Performed by: NURSE PRACTITIONER

## 2024-07-31 PROCEDURE — 83036 HEMOGLOBIN GLYCOSYLATED A1C: CPT | Performed by: NURSE PRACTITIONER

## 2024-07-31 PROCEDURE — 99396 PREV VISIT EST AGE 40-64: CPT | Mod: 25 | Performed by: NURSE PRACTITIONER

## 2024-07-31 PROCEDURE — 86706 HEP B SURFACE ANTIBODY: CPT | Performed by: NURSE PRACTITIONER

## 2024-07-31 PROCEDURE — 36415 COLL VENOUS BLD VENIPUNCTURE: CPT | Performed by: NURSE PRACTITIONER

## 2024-07-31 RX ORDER — LISINOPRIL 10 MG/1
10 TABLET ORAL DAILY
Qty: 90 TABLET | Refills: 3 | Status: SHIPPED | OUTPATIENT
Start: 2024-07-31

## 2024-07-31 ASSESSMENT — PAIN SCALES - GENERAL: PAINLEVEL: NO PAIN (0)

## 2024-07-31 NOTE — PROGRESS NOTES
"Preventive Care Visit  [facility]  Margarita Suazo NP, Nurse Practitioner - Family  Jul 31, 2024      Assessment & Plan     Routine general medical examination at a health care facility    - Hemoglobin A1c; Future  - Hepatitis B Surface Antibody; Future  - Hemoglobin A1c  - Hepatitis B Surface Antibody    Mixed hyperlipidemia    - Lipid panel reflex to direct LDL Non-fasting; Future  - Lipid panel reflex to direct LDL Non-fasting    Benign essential hypertension  Known issue that I take into account for their medical decisions, no current exacerbations or new concerns.   - lisinopril (ZESTRIL) 10 MG tablet; Take 1 tablet (10 mg) by mouth daily    Multiple sclerosis (H)  Known issue that I take into account for their medical decisions, no current exacerbations or new concerns.  Followed by Neurology for management.    Sarcoidosis  Known issue that I take into account for their medical decisions, no current exacerbations or new concerns.  Followed by Pulmonary Dr. Perlman for management.`      BMI  Estimated body mass index is 32.32 kg/m  as calculated from the following:    Height as of this encounter: 1.651 m (5' 5\").    Weight as of this encounter: 88.1 kg (194 lb 3.2 oz).   Weight management plan: Discussed healthy diet and exercise guidelines    Counseling  Appropriate preventive services were addressed with this patient via screening, questionnaire, or discussion as appropriate for fall prevention, nutrition, physical activity, Tobacco-use cessation, weight loss and cognition.  Checklist reviewing preventive services available has been given to the patient.  Reviewed patient's diet, addressing concerns and/or questions.         Alyssa Jhaveri is a 58 year old, presenting for the following:  Physical (Diet coke at 6:30am )        7/31/2024     7:46 AM   Additional Questions   Roomed by Jeanine LIVINGSTON       Health Care Directive  Patient does not have a Health Care Directive or Living " Will: Discussed advance care planning with patient; information given to patient to review.    HPI        7/29/2024   General Health   How would you rate your overall physical health? Good   Feel stress (tense, anxious, or unable to sleep) Patient declined            7/29/2024   Nutrition   Three or more servings of calcium each day? Yes   Diet: Regular (no restrictions)   How many servings of fruit and vegetables per day? (!) 2-3   How many sweetened beverages each day? 0-1            7/29/2024   Exercise   Days per week of moderate/strenous exercise 5 days   Average minutes spent exercising at this level 20 min            7/29/2024   Social Factors   Frequency of gathering with friends or relatives Once a week   Worry food won't last until get money to buy more No   Food not last or not have enough money for food? No   Do you have housing? (Housing is defined as stable permanent housing and does not include staying ouside in a car, in a tent, in an abandoned building, in an overnight shelter, or couch-surfing.) Yes   Are you worried about losing your housing? No   Lack of transportation? No   Unable to get utilities (heat,electricity)? No            7/29/2024   Fall Risk   Fallen 2 or more times in the past year? No   Trouble with walking or balance? Yes              7/29/2024   Dental   Dentist two times every year? (!) DECLINE                     7/29/2024   Substance Use   Alcohol more than 3/day or more than 7/wk No   Do you use any other substances recreationally? No        Social History     Tobacco Use    Smoking status: Never    Smokeless tobacco: Never   Vaping Use    Vaping status: Never Used   Substance Use Topics    Alcohol use: Not Currently     Comment: rarely    Drug use: Never           4/10/2024   LAST FHS-7 RESULTS   1st degree relative breast or ovarian cancer No   Any relative bilateral breast cancer No   Any male have breast cancer No   Any ONE woman have BOTH breast AND ovarian cancer No    Any woman with breast cancer before 50yrs No   2 or more relatives with breast AND/OR ovarian cancer No   2 or more relatives with breast AND/OR bowel cancer No           Mammogram Screening - Mammogram every 1-2 years updated in Health Maintenance based on mutual decision making        7/29/2024   STI Screening   New sexual partner(s) since last STI/HIV test? No        History of abnormal Pap smear: YES - reflected in Problem List and Health Maintenance accordingly        Latest Ref Rng & Units 6/1/2023     3:53 PM 7/30/2019    10:00 AM   PAP / HPV   PAP  Negative for Intraepithelial Lesion or Malignancy (NILM)     HPV 16 DNA Negative Negative     HPV 18 DNA Negative Negative     Other HR HPV Negative Negative     PAP-ABSTRACT   See Scanned Document           This result is from an external source.     ASCVD Risk   The 10-year ASCVD risk score (Jefe AYALA, et al., 2019) is: 3.2%    Values used to calculate the score:      Age: 58 years      Sex: Female      Is Non- : No      Diabetic: No      Tobacco smoker: No      Systolic Blood Pressure: 110 mmHg      Is BP treated: Yes      HDL Cholesterol: 45 mg/dL      Total Cholesterol: 243 mg/dL           Reviewed and updated as needed this visit by Provider     Meds   Med Hx  Surg Hx  Fam Hx   Sexual Activity          BP Readings from Last 3 Encounters:   07/31/24 110/60   07/30/24 119/77   07/18/24 126/80    Wt Readings from Last 3 Encounters:   07/31/24 88.1 kg (194 lb 3.2 oz)   07/18/24 87.1 kg (192 lb)   05/16/24 88 kg (194 lb)                  Patient Active Problem List   Diagnosis    Anxiety    Cervical high risk HPV (human papillomavirus) test positive    Multiple sclerosis (H)    Primary meningioma of optic nerve sheath (H)    Optic neuritis    Benign essential hypertension    Mixed hyperlipidemia    S/P cervical spinal fusion    Other cord compression (H)    Atrophic vaginitis    Kidney stone    Recurrent urinary tract  infection    Thoracic lymphadenopathy    Pulmonary nodules    Abdominal lymphadenopathy    Sarcoidosis     Past Surgical History:   Procedure Laterality Date    BRONCHOSCOPY RIGID OR FLEXIBLE W/TRANSENDOSCOPIC ENDOBRONCHIAL ULTRASOUND GUIDED N/A 4/18/2024    Procedure: BRONCHOSCOPY, WITH ENDOBRONCHIAL ULTRASOUND;  Surgeon: Ge Parham MD;  Location: [facility] [place]    COLONOSCOPY N/A 3/6/2024    Procedure: COLONOSCOPY, WITH POLYPECTOMY;  Surgeon: Rosa Maria Ruiz MD;  Location:  GI    DISCECTOMY, FUSION CERVICAL ANTERIOR TWO LEVELS, COMBINED N/A 12/20/2022    Procedure: Anterior Cervical 4-6 Fusion with interbody cages and cervical plate;  Surgeon: Be Solorzano MD;  Location:  OR       Social History     Tobacco Use    Smoking status: Never    Smokeless tobacco: Never   Substance Use Topics    Alcohol use: Not Currently     Comment: rarely     Family History   Problem Relation Age of Onset    Bipolar Disorder Mother     Hypertension Father     Lung Cancer Maternal Grandmother     Lung Cancer Maternal Grandfather     Lung Cancer Paternal Grandmother     Hirschsprung's Disease Son     Anxiety Disorder Daughter     Glaucoma No family hx of     Macular Degeneration No family hx of          Current Outpatient Medications   Medication Sig Dispense Refill    cholecalciferol (VITAMIN D3) 125 mcg (5000 units) capsule Take 1 capsule (125 mcg) by mouth daily 90 capsule 3    estradiol (VAGIFEM) 10 MCG TABS vaginal tablet Place 1 tablet (10 mcg) vaginally twice a week Place 1 tablet (10 mcg) vaginally daily for 14 days, THEN 1 tablet (10 mcg) twice a week for 76 days. 24 tablet 3    lisinopril (ZESTRIL) 10 MG tablet Take 1 tablet (10 mg) by mouth daily 90 tablet 3    LORazepam (ATIVAN) 0.5 MG tablet Take 0.5-1 mg by mouth daily as needed      MAYZENT 1 MG TABS Take 1 mg by mouth daily 30 tablet 11    PARoxetine (PAXIL) 30 MG tablet Take 30 mg by mouth every evening       Allergies   Allergen Reactions    Penicillins   "           Objective    Exam  /60 (BP Location: Right arm, Patient Position: Sitting, Cuff Size: Adult Large)   Pulse 83   Temp 99  F (37.2  C) (Oral)   Resp 20   Ht 1.651 m (5' 5\")   Wt 88.1 kg (194 lb 3.2 oz)   SpO2 96%   BMI 32.32 kg/m     Estimated body mass index is 32.32 kg/m  as calculated from the following:    Height as of this encounter: 1.651 m (5' 5\").    Weight as of this encounter: 88.1 kg (194 lb 3.2 oz).    Physical Exam  GENERAL: alert and no distress  EYES: Eyes grossly normal to inspection, PERRL and conjunctivae and sclerae normal  HENT: ear canals and TM's normal, nose and mouth without ulcers or lesions  NECK: no adenopathy, no asymmetry, masses, or scars  RESP: lungs clear to auscultation - no rales, rhonchi or wheezes  BREAST: normal without masses, tenderness or nipple discharge and no palpable axillary masses or adenopathy  CV: regular rate and rhythm, normal S1 S2, no S3 or S4, no murmur, click or rub, no peripheral edema  SKIN: no suspicious lesions or rashes  NEURO: Normal strength and tone, mentation intact and speech normal  PSYCH: mentation appears normal, affect normal/bright        Signed Electronically by: Margarita Suazo NP    "

## 2024-07-31 NOTE — PATIENT INSTRUCTIONS
Patient Education   Preventive Care Advice   This is general advice given by our system to help you stay healthy. However, your care team may have specific advice just for you. Please talk to your care team about your preventive care needs.  Nutrition  Eat 5 or more servings of fruits and vegetables each day.  Try wheat bread, brown rice and whole grain pasta (instead of white bread, rice, and pasta).  Get enough calcium and vitamin D. Check the label on foods and aim for 100% of the RDA (recommended daily allowance).  Lifestyle  Exercise at least 150 minutes each week  (30 minutes a day, 5 days a week).  Do muscle strengthening activities 2 days a week. These help control your weight and prevent disease.  No smoking.  Wear sunscreen to prevent skin cancer.  Have a dental exam and cleaning every 6 months.  Yearly exams  See your health care team every year to talk about:  Any changes in your health.  Any medicines your care team has prescribed.  Preventive care, family planning, and ways to prevent chronic diseases.  Shots (vaccines)   HPV shots (up to age 26), if you've never had them before.  Hepatitis B shots (up to age 59), if you've never had them before.  COVID-19 shot: Get this shot when it's due.  Flu shot: Get a flu shot every year.  Tetanus shot: Get a tetanus shot every 10 years.  Pneumococcal, hepatitis A, and RSV shots: Ask your care team if you need these based on your risk.  Shingles shot (for age 50 and up)  General health tests  Diabetes screening:  Starting at age 35, Get screened for diabetes at least every 3 years.  If you are younger than age 35, ask your care team if you should be screened for diabetes.  Cholesterol test: At age 39, start having a cholesterol test every 5 years, or more often if advised.  Bone density scan (DEXA): At age 50, ask your care team if you should have this scan for osteoporosis (brittle bones).  Hepatitis C: Get tested at least once in your life.  STIs (sexually  transmitted infections)  Before age 24: Ask your care team if you should be screened for STIs.  After age 24: Get screened for STIs if you're at risk. You are at risk for STIs (including HIV) if:  You are sexually active with more than one person.  You don't use condoms every time.  You or a partner was diagnosed with a sexually transmitted infection.  If you are at risk for HIV, ask about PrEP medicine to prevent HIV.  Get tested for HIV at least once in your life, whether you are at risk for HIV or not.  Cancer screening tests  Cervical cancer screening: If you have a cervix, begin getting regular cervical cancer screening tests starting at age 21.  Breast cancer scan (mammogram): If you've ever had breasts, begin having regular mammograms starting at age 40. This is a scan to check for breast cancer.  Colon cancer screening: It is important to start screening for colon cancer at age 45.  Have a colonoscopy test every 10 years (or more often if you're at risk) Or, ask your provider about stool tests like a FIT test every year or Cologuard test every 3 years.  To learn more about your testing options, visit:   .  For help making a decision, visit:   https://bit.ly/kw48856.  Prostate cancer screening test: If you have a prostate, ask your care team if a prostate cancer screening test (PSA) at age 55 is right for you.  Lung cancer screening: If you are a current or former smoker ages 50 to 80, ask your care team if ongoing lung cancer screenings are right for you.  For informational purposes only. Not to replace the advice of your health care provider. Copyright   2023 [facility]. All rights reserved. Clinically reviewed by the [facility]. [employer] [id number] - REV 01/24.  Preventing Falls: Care Instructions  Injuries and health problems such as trouble walking or poor eyesight can increase your risk of falling. So can some medicines. But there are things you can do to help  "prevent falls. You can exercise to get stronger. You can also arrange your home to make it safer.    Talk to your doctor about the medicines you take. Ask if any of them increase the risk of falls and whether they can be changed or stopped.   Try to exercise regularly. It can help improve your strength and balance. This can help lower your risk of falling.     Practice fall safety and prevention.    Wear low-heeled shoes that fit well and give your feet good support. Talk to your doctor if you have foot problems that make this hard.  Carry a cellphone or wear a medical alert device that you can use to call for help.  Use stepladders instead of chairs to reach high objects. Don't climb if you're at risk for falls. Ask for help, if needed.  Wear the correct eyeglasses, if you need them.    Make your home safer.    Remove rugs, cords, clutter, and furniture from walkways.  Keep your house well lit. Use night-lights in hallways and bathrooms.  Install and use sturdy handrails on stairways.  Wear nonskid footwear, even inside. Don't walk barefoot or in socks without shoes.    Be safe outside.    Use handrails, curb cuts, and ramps whenever possible.  Keep your hands free by using a shoulder bag or backpack.  Try to walk in well-lit areas. Watch out for uneven ground, changes in pavement, and debris.  Be careful in the winter. Walk on the grass or gravel when sidewalks are slippery. Use de-icer on steps and walkways. Add non-slip devices to shoes.    Put grab bars and nonskid mats in your shower or tub and near the toilet. Try to use a shower chair or bath bench when bathing.   Get into a tub or shower by putting in your weaker leg first. Get out with your strong side first. Have a phone or medical alert device in the bathroom with you.   Where can you learn more?  Go to https://www.[employer].net/patiented  Enter G117 in the search box to learn more about \"Preventing Falls: Care Instructions.\"  Current as of: July 17, " 2023               Content Version: 14.0    [id number] [employer].   Care instructions adapted under license by your healthcare professional. If you have questions about a medical condition or this instruction, always ask your healthcare professional. [employer] disclaims any warranty or liability for your use of this information.

## 2024-07-31 NOTE — NURSING NOTE
Prior to immunization administration, verified patients identity using patient s name and date of birth. Please see Immunization Activity for additional information.     Screening Questionnaire for Adult Immunization    Are you sick today?   No   Do you have allergies to medications, food, a vaccine component or latex?   No   Have you ever had a serious reaction after receiving a vaccination?   No   Do you have a long-term health problem with heart, lung, kidney, or metabolic disease (e.g., diabetes), asthma, a blood disorder, no spleen, complement component deficiency, a cochlear implant, or a spinal fluid leak?  Are you on long-term aspirin therapy?   No   Do you have cancer, leukemia, HIV/AIDS, or any other immune system problem?   No   Do you have a parent, brother, or sister with an immune system problem?   No   In the past 3 months, have you taken medications that affect  your immune system, such as prednisone, other steroids, or anticancer drugs; drugs for the treatment of rheumatoid arthritis, Crohn s disease, or psoriasis; or have you had radiation treatments?   No   Have you had a seizure, or a brain or other nervous system problem?   No   During the past year, have you received a transfusion of blood or blood    products, or been given immune (gamma) globulin or antiviral drug?   No   For women: Are you pregnant or is there a chance you could become       pregnant during the next month?   No   Have you received any vaccinations in the past 4 weeks?   No     Immunization questionnaire answers were all negative.      Patient instructed to remain in clinic for 15 minutes afterwards, and to report any adverse reactions.     Screening performed by Crystal Reveles MA on 7/31/2024 at 8:56 AM.

## 2024-08-15 ENCOUNTER — PRE VISIT (OUTPATIENT)
Dept: UROLOGY | Facility: CLINIC | Age: 59
End: 2024-08-15
Payer: COMMERCIAL

## 2024-08-15 NOTE — TELEPHONE ENCOUNTER
Reason for visit: UDS follow up     Relevant information: Hx of urinary urgency, incomplete bladder emptying, urinary frequency, rUTIs    Records/imaging/labs/orders: All records available    Pt called: No need for a call    At Rooming: Standard procedure    Mayra Alba  8/15/2024  10:08 AM

## 2024-09-04 NOTE — LETTER
"5/5/2022      RE: Shakila Kapoor  [address]   [place] [address]     Referral source: Established patient of Dr. Savannah Sharma    Chief complaint: Multiple sclerosis    History of the Present Illness: Ms. Shakila Kapoor is a 56-year-old right-handed woman who returns to the [facility] today to review the results of MRI scans performed earlier on today's date, and to establish care with me after Dr. Sharma's departure.    The patient's history is as documented in the chart.  History of symptoms of demyelinating disease dates back to age 20, at which time she had left-sided sensory changes as well as a probable episode of optic neuritis.  She indicates that after investigations including a CSF examination, she was diagnosed with \"probable/possible multiple sclerosis.\"  Subsequently, he hasd not had any significant neurologic changes for many years.  However, in 2019, she developed progressive difficulty with vision in the right eye.  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  However, due to findings suggestive of demyelinating disease on brain MRI, she was given a formal diagnosis of multiple sclerosis at that time started on disease-modifying therapy with the Rebif formulation of interferon beta.      In 2021, MRI imaging demonstrated progression of new lesions.  She established care with Dr. Sharma and was placed on disease-modifying therapy with ozanimod.  It was also noted that she had significant cervical stenosis at C4-C5 and C5-C6 and was referred to neurosurgery.  After consultation, the patient decided not to proceed with elective decompression.    Today, the patient denies any new episodic changes in vision, balance, strength or sensation suggestive of new relapse of multiple sclerosis since she was last seen.    Symptomatically, she describes her gait as \"wonky.\" Her " "balance is off.  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  This occurred when she lost her balance while turning.      She describes herself as \"always tired.\" Dr. Sharma had given her a prescription for amantadine; she is not taking this consistently.  She relates that her  tells her that she snores and she has wondered about sleep-disordered breathing.    She does have significantly altered visual acuity in the right eye.  She is able to perceive shapes and movements in that eye.    Past Medical History:  1.  Postpartum depression.  2.  Anxiety.    Medications:  1.  Paroxetine 30 mg p.o. daily.  2.  Oxybutynin XL 10 mg p.o. daily.    Family History: She is not aware of any family history of multiple sclerosis or other autoimmune disease.    Social History: The patient is employed as a  at [employer] in the [employer].  She is a lifelong non-smoker.    PHYSICAL EXAMINATION:    VITAL SIGNS:  Blood pressure 158/70; pulse 101; oxygen saturation 100%; weight 88.5 kg.  GENERAL:  Overweight woman who presents to the examination alone, awake and alert and in no acute distress.    NEUROLOGIC EXAMINATION:  CRANIAL NERVES:  Visual acuity is significantly impaired in the right eye ,as above.  Visual fields are full in the left eye to gross confrontation.  Extraocular movements are intact with no internuclear ophthalmoplegia.  Facial strength is normal.  Palate elevation and tongue protrusion are normal.  POWER:  Strength is within normal limits in proximal and distal muscles in the upper and lower limbs throughout with the exception of hip flexor muscles, which grade 4+ bilaterally.  REFLEXES:  Reflexes are roughly symmetric and within normal limits in the arms.  Reflexes are slightly brisk in the lower limbs, but again relatively symmetric.  MOTOR/CEREBELLAR:  There is no appendicular ataxia on finger-to-nose " testing.  Rapid alternating movements are within normal limits in hands and fingers.  There is no pronator drift in the arms.    Investigations: I reviewed images from MRI scans of the brain, cervical and thoracic spine performed earlier on today's date.  MRI scan of the brain demonstrates multiple periventricular and juxtacortical foci of T2 hyperintensity in the white matter of the cerebral hemispheres bilaterally, unchanged in comparison to previous orbital MRI of 04/08/2022 and brain MRI of 09/29/2021.      MRI scans of the cervical and thoracic spine demonstrate multiple short segment foci of T2 hyperintensity in the cord parenchyma, with the distribution of T2 hyperintense lesions grossly unchanged in comparison to previous cervical MRI of 06/21/2021 and thoracic MRI scan of 06/26/2020.  However, there is a new focus of gadolinium enhancement in the cervical cord at C5-C6 (see series 12, image 9), adjacent to the region of maximal stenosis of the cervical spinal canal from disc osteophyte complex.    Assessment/plan:     1.  Multiple sclerosis  2.  Cervical spinal stenosis  I discussed with the patient that her MRI imaging demonstrates an enhancing focus in the cervical cord.  This was read by the interpreting radiologist as a probable focus of active demyelination, and I agree that this is possible.  However, it is also possible that this is signal abnormality due to compressive myelopathy.      I discussed with the patient that I think it is important to try to distinguish which one of these etiologies is in fact the case, and that the most effective way to do so would be to repeat the cervical MRI imaging in 6 months.  If this is a focus of active demyelination, the contrast enhancement would be expected to have resolved at that time.  However, if the signal abnormality is due to compressive myelopathy is likely that enhancement will persist and if that is the case, we would need to re-evaluate the decision  on whether to proceed with surgery.    I discussed with her that cervical spinal stenosis in the setting of known multiple sclerosis is a somewhat difficult situation.  Realistically, the only way to determine whether decompressing her cervical spine would be of any current or future benefit for her would be to fix the problem, and this would not be a minor surgery.      For now, we will continue with close observation and repeat the imaging in 6 months as discussed above.  She will continue ozanimod for now.  I did review the results of laboratory studies performed earlier on today's date including normal liver function tests and normal complete blood counts with differential, apart from low lymphocyte count, which is an expected finding in a person on a sphingosine analogue. Non-fasting glucose was somewhat elevated at 172 mg/dL, and I advised her to review this with primary care.    3.  Gait disturbance  I strongly recommended a physical therapy evaluation to the patient.  Falls are concerning in and of themselves ,and particularly in light of the fairly tight cervical stenosis, she is at risk of severe injury from falling.  She is agreeable to the physical therapy referral and we will so arrange    4.  Chronic fatigue  She has really not been taking amantadine and is uncertain if this was doing anything for her.  I did tell her that she could stop this medication.  We discussed that regular aerobic exercise has been shown to be of benefit for MS-associated fatigue, and I advised an initial goal of 90 minutes of aerobic exercise weekly.  We will also refer her to sleep medicine for further investigation regarding the possibility of sleep-disordered breathing in light of her report of snoring and non-refreshing sleep.    I spent a total of 52 minutes on patient care activities related to this encounter on the date of service, including time spent reviewing the chart, performing independent review of neuroimaging,  obtaining history and examination from and in counseling the patient.    Daniel Harris MD   of Neurology  [facility]    Cc:  Abdulaziz Pride MD (Radiation Oncology)  Samuel Soto MD (Ophthalmology)  Patient     04-Sep-2024

## 2024-10-10 ENCOUNTER — OFFICE VISIT (OUTPATIENT)
Dept: OBGYN | Facility: CLINIC | Age: 59
End: 2024-10-10
Payer: COMMERCIAL

## 2024-10-10 VITALS
TEMPERATURE: 98.5 F | DIASTOLIC BLOOD PRESSURE: 82 MMHG | WEIGHT: 198 LBS | HEART RATE: 108 BPM | BODY MASS INDEX: 32.95 KG/M2 | SYSTOLIC BLOOD PRESSURE: 149 MMHG

## 2024-10-10 DIAGNOSIS — N95.8 GENITOURINARY SYNDROME OF MENOPAUSE: ICD-10-CM

## 2024-10-10 PROCEDURE — 99213 OFFICE O/P EST LOW 20 MIN: CPT | Performed by: OBSTETRICS & GYNECOLOGY

## 2024-10-10 RX ORDER — ESTRADIOL 10 UG/1
10 INSERT VAGINAL
Qty: 24 TABLET | Refills: 3 | Status: SHIPPED | OUTPATIENT
Start: 2024-10-10

## 2024-10-10 NOTE — PROGRESS NOTES
Assessment & Plan     Genitourinary syndrome of menopause  Doing well now.   No visible lesions or pelvic floor muscle pain  Recommend silicone based lubricant.   Ok to refill vagifem with PCP if they are comfortable with that. RTC for concerns.  - estradiol (VAGIFEM) 10 MCG TABS vaginal tablet; Place 1 tablet (10 mcg) vaginally twice a week.                No follow-ups on file.    Alyssa Jhaveri is a 59 year old, presenting for the following health issues:  Vaginal Problem and Medication Follow-up    HPI   Presents for followup of genitourinary syndrome of menopause.   Developed a sore spot in the vagina after starting vagifem.   Uncomfortable during sex, improved with as time passed with intercourse.     Has gotten more regular with using the vagifem, and now the pain went away.   Was in a very stressful time with other medical and work issues, and that has also resolved.   Has not tried having sex since.   Does use lubrication with sex.     Past Medical History:   Diagnosis Date    Benign essential hypertension 05/25/2022    Benign meningioma (H)     Gait instability     Kidney stone 10/25/2023    Lymphadenopathy     Meningioma (H)     Mixed hyperlipidemia 05/26/2022    Multiple sclerosis (H)     Pulmonary nodules     Sarcoidosis        Past Surgical History:   Procedure Laterality Date    BRONCHOSCOPY RIGID OR FLEXIBLE W/TRANSENDOSCOPIC ENDOBRONCHIAL ULTRASOUND GUIDED N/A 4/18/2024    Procedure: BRONCHOSCOPY, WITH ENDOBRONCHIAL ULTRASOUND;  Surgeon: Ge Parham MD;  Location: [facility] OR    COLONOSCOPY N/A 3/6/2024    Procedure: COLONOSCOPY, WITH POLYPECTOMY;  Surgeon: Rosa Maria Ruiz MD;  Location:  GI    DISCECTOMY, FUSION CERVICAL ANTERIOR TWO LEVELS, COMBINED N/A 12/20/2022    Procedure: Anterior Cervical 4-6 Fusion with interbody cages and cervical plate;  Surgeon: Be Solorzano MD;  Location:  OR       Family History   Problem Relation Age of Onset    Bipolar Disorder Mother      Hypertension Father     Lung Cancer Maternal Grandmother     Lung Cancer Maternal Grandfather     Lung Cancer Paternal Grandmother     Hirschsprung's Disease Son     Anxiety Disorder Daughter     Glaucoma No family hx of     Macular Degeneration No family hx of        Social History     Socioeconomic History    Marital status:      Spouse name: Not on file    Number of children: Not on file    Years of education: Not on file    Highest education level: Not on file   Occupational History    Not on file   Tobacco Use    Smoking status: Never    Smokeless tobacco: Never   Vaping Use    Vaping status: Never Used   Substance and Sexual Activity    Alcohol use: Not Currently     Comment: rarely    Drug use: Never    Sexual activity: Yes     Partners: Male     Birth control/protection: None   Other Topics Concern    Not on file   Social History Narrative    Not on file     Social Determinants of Health     Financial Resource Strain: Low Risk  (7/29/2024)    Financial Resource Strain     Within the past 12 months, have you or your family members you live with been unable to get utilities (heat, electricity) when it was really needed?: No   Food Insecurity: Low Risk  (7/29/2024)    Food Insecurity     Within the past 12 months, did you worry that your food would run out before you got money to buy more?: No     Within the past 12 months, did the food you bought just not last and you didn t have money to get more?: No   Transportation Needs: Low Risk  (7/29/2024)    Transportation Needs     Within the past 12 months, has lack of transportation kept you from medical appointments, getting your medicines, non-medical meetings or appointments, work, or from getting things that you need?: No   Physical Activity: Insufficiently Active (7/29/2024)    Exercise Vital Sign     Days of Exercise per Week: 5 days     Minutes of Exercise per Session: 20 min   Stress: Patient Declined (7/29/2024)    [employer] - Occupational Stress Questionnaire     Feeling of Stress : Patient declined   Social Connections: Unknown (7/29/2024)    Social Connection and Isolation Panel [NHANES]     Frequency of Communication with Friends and Family: Not on file     Frequency of Social Gatherings with Friends and Family: Once a week     Attends Worship Services: Not on file     Active Member of Clubs or Organizations: Not on file     Attends Club or Organization Meetings: Not on file     Marital Status: Not on file   Interpersonal Safety: Low Risk  (7/31/2024)    Interpersonal Safety     Do you feel physically and emotionally safe where you currently live?: Yes     Within the past 12 months, have you been hit, slapped, kicked or otherwise physically hurt by someone?: No     Within the past 12 months, have you been humiliated or emotionally abused in other ways by your partner or ex-partner?: No   Housing Stability: Low Risk  (7/29/2024)    Housing Stability     Do you have housing? : Yes     Are you worried about losing your housing?: No       Current Outpatient Medications   Medication Sig Dispense Refill    cholecalciferol (VITAMIN D3) 125 mcg (5000 units) capsule Take 1 capsule (125 mcg) by mouth daily 90 capsule 3    estradiol (VAGIFEM) 10 MCG TABS vaginal tablet Place 1 tablet (10 mcg) vaginally twice a week Place 1 tablet (10 mcg) vaginally daily for 14 days, THEN 1 tablet (10 mcg) twice a week for 76 days. 24 tablet 3    lisinopril (ZESTRIL) 10 MG tablet Take 1 tablet (10 mg) by mouth daily 90 tablet 3    LORazepam (ATIVAN) 0.5 MG tablet Take 0.5-1 mg by mouth daily as needed      MAYZENT 1 MG TABS Take 1 mg by mouth daily 30 tablet 11    PARoxetine (PAXIL) 30 MG tablet Take 30 mg by mouth every evening       No current facility-administered medications for this visit.          Allergies   Allergen Reactions    Penicillins            Review of Systems  Constitutional, HEENT, cardiovascular, pulmonary, gi and gu systems are  negative, except as otherwise noted.      Objective    BP (!) 149/82   Pulse 108   Temp 98.5  F (36.9  C)   Wt 89.8 kg (198 lb)   BMI 32.95 kg/m    Body mass index is 32.95 kg/m .  Physical Exam   GENERAL: alert and no distress   (female) w/bimanual: normal female external genitalia, normal urethral meatus, normal vaginal mucosa, normal cervix/adnexa/uterus without masses or discharge  MS: no gross musculoskeletal defects noted, no edema  PSYCH: mentation appears normal, affect normal/bright    EXAMINATION: US PELVIC TRANSABDOMINAL AND TRANSVAGINAL, 3/12/2024 9:16  AM      COMPARISON: CT abdomen pelvis 2/27/2024     HISTORY: Follow-up to prior CT showing thickened endometrium and  lymphadenopathy; Thickened endometrium     TECHNIQUE: The pelvis was scanned in standard fashion with  transabdominal and transvaginal transducer(s) using both grey scale  and spectral flow and  color Doppler techniques.     FINDINGS:  The uterus measures 7.4 x 5.0 x 3.8 cm, and there is no evidence of a  focal fibroid.  The endometrium is mildly heterogeneous and measures 4  mm. There is no free fluid in the pelvis. Trace free fluid within the  endometrial canal.     The right ovary measures 2.2 x 2.1 x 1.4 cm and the left ovary  measures 2.1 x 2. x 1.5 cm. There is no adnexal mass. There is normal  blood flow to the ovaries.     Small amount of echogenic debris within the urinary bladder without  significant wall thickening.                                                                      IMPRESSION:   Normal sonographic appearance of the uterus and ovaries. Endometrial  stripe is within normal limits, measuring 4 mm.      I have personally reviewed the examination and initial interpretation  and I agree with the findings.     MARY JO NUÑZE DO         Signed Electronically by: Gisel Guadalupe MD

## 2024-10-15 ENCOUNTER — PRE VISIT (OUTPATIENT)
Dept: UROLOGY | Facility: CLINIC | Age: 59
End: 2024-10-15
Payer: COMMERCIAL

## 2024-10-15 DIAGNOSIS — R33.9 INCOMPLETE BLADDER EMPTYING: Primary | ICD-10-CM

## 2024-10-15 NOTE — TELEPHONE ENCOUNTER
Reason for visit: UDS    Study scheduled because: ncomplete bladder emptying, urinary urgency, incontinence      Relevant information: MS, Vandana    Records/imaging/labs/orders: all records available    UA/UC ordered: yes    Nancy Garsia  10/15/2024  7:18 AM

## 2024-10-18 ENCOUNTER — PRE VISIT (OUTPATIENT)
Dept: UROLOGY | Facility: CLINIC | Age: 59
End: 2024-10-18
Payer: COMMERCIAL

## 2024-10-19 NOTE — TELEPHONE ENCOUNTER
Reason for visit: follow up     Relevant information: UDS follow up    Records/imaging/labs/orders: UDS scheduled 10/30/24    Pt called: No need for a call    At Rooming: alexus Jay  10/18/2024  10:48 PM

## 2024-10-25 ENCOUNTER — TELEPHONE (OUTPATIENT)
Dept: UROLOGY | Facility: CLINIC | Age: 59
End: 2024-10-25
Payer: COMMERCIAL

## 2024-10-25 NOTE — TELEPHONE ENCOUNTER
Writer called pt to discuss scheduling a urinalysis prior to their upcoming Urodynamics appointment.    Pt didn't  the phone, writer did leave a voicemail for the pt.     Writer left information about getting a UA/UC completed prior to pts urodynamics appointment    Nancy Garsia, EMT

## 2024-10-28 ENCOUNTER — TELEPHONE (OUTPATIENT)
Dept: NEUROLOGY | Facility: CLINIC | Age: 59
End: 2024-10-28
Payer: COMMERCIAL

## 2024-10-28 NOTE — TELEPHONE ENCOUNTER
Left Voicemail (1st Attempt) and Sent Mychart (1st Attempt) for the patient to call back and schedule the following:    Appointment type: Return MS  Provider: Dr. Harris  Return date: see below  Specialty phone number: [phone]  Additional appointment(s) needed:   Additonal Notes:     Please manually check the providers sched, starting in Jan. Please manually schedule the pt in held slots in Jan.

## 2024-11-01 NOTE — TELEPHONE ENCOUNTER
Left Voicemail (2nd Attempt) and Sent Mychart (2nd Attempt) for the patient to call back and schedule the following:    Appointment type: Return MS  Provider: Dr. Harris  Return date: see below  Specialty phone number: [phone]  Additional appointment(s) needed:   Additonal Notes:      Please manually check the providers sched, starting in Jan. Please manually schedule the pt in held slots in Jan.     Capri Snyder on 11/1/2024 at 9:54 AM

## 2024-12-04 ENCOUNTER — TRANSCRIBE ORDERS (OUTPATIENT)
Dept: OTHER | Age: 59
End: 2024-12-04

## 2024-12-04 DIAGNOSIS — R32 UNSPECIFIED URINARY INCONTINENCE: Primary | ICD-10-CM

## 2025-01-21 ENCOUNTER — OFFICE VISIT (OUTPATIENT)
Dept: NEUROLOGY | Facility: CLINIC | Age: 60
End: 2025-01-21
Attending: PSYCHIATRY & NEUROLOGY
Payer: COMMERCIAL

## 2025-01-21 ENCOUNTER — LAB (OUTPATIENT)
Dept: LAB | Facility: CLINIC | Age: 60
End: 2025-01-21
Payer: COMMERCIAL

## 2025-01-21 VITALS
WEIGHT: 201.6 LBS | HEIGHT: 66 IN | DIASTOLIC BLOOD PRESSURE: 77 MMHG | OXYGEN SATURATION: 95 % | BODY MASS INDEX: 32.4 KG/M2 | SYSTOLIC BLOOD PRESSURE: 117 MMHG | HEART RATE: 92 BPM

## 2025-01-21 DIAGNOSIS — G35 MS (MULTIPLE SCLEROSIS) (H): ICD-10-CM

## 2025-01-21 DIAGNOSIS — G35 MS (MULTIPLE SCLEROSIS) (H): Primary | ICD-10-CM

## 2025-01-21 DIAGNOSIS — R33.9 INCOMPLETE BLADDER EMPTYING: ICD-10-CM

## 2025-01-21 DIAGNOSIS — D86.9 SARCOIDOSIS: ICD-10-CM

## 2025-01-21 DIAGNOSIS — N31.9 NEUROGENIC BLADDER: ICD-10-CM

## 2025-01-21 DIAGNOSIS — D32.0 MENINGIOMA OF OPTIC NERVE SHEATH (H): ICD-10-CM

## 2025-01-21 DIAGNOSIS — R26.9 GAIT DISTURBANCE: ICD-10-CM

## 2025-01-21 LAB
ALBUMIN SERPL BCG-MCNC: 4.4 G/DL (ref 3.5–5.2)
ALBUMIN UR-MCNC: NEGATIVE MG/DL
ALP SERPL-CCNC: 76 U/L (ref 40–150)
ALT SERPL W P-5'-P-CCNC: 20 U/L (ref 0–50)
APPEARANCE UR: ABNORMAL
AST SERPL W P-5'-P-CCNC: 21 U/L (ref 0–45)
BACTERIA #/AREA URNS HPF: ABNORMAL /HPF
BASOPHILS # BLD AUTO: 0 10E3/UL (ref 0–0.2)
BASOPHILS NFR BLD AUTO: 0 %
BILIRUB DIRECT SERPL-MCNC: <0.2 MG/DL (ref 0–0.3)
BILIRUB SERPL-MCNC: 0.4 MG/DL
BILIRUB UR QL STRIP: NEGATIVE
COLOR UR AUTO: ABNORMAL
EOSINOPHIL # BLD AUTO: 0.1 10E3/UL (ref 0–0.7)
EOSINOPHIL NFR BLD AUTO: 1 %
ERYTHROCYTE [DISTWIDTH] IN BLOOD BY AUTOMATED COUNT: 13.3 % (ref 10–15)
GLUCOSE UR STRIP-MCNC: NEGATIVE MG/DL
HCT VFR BLD AUTO: 43.8 % (ref 35–47)
HGB BLD-MCNC: 14.8 G/DL (ref 11.7–15.7)
HGB UR QL STRIP: NEGATIVE
IMM GRANULOCYTES # BLD: 0 10E3/UL
IMM GRANULOCYTES NFR BLD: 0 %
KETONES UR STRIP-MCNC: NEGATIVE MG/DL
LEUKOCYTE ESTERASE UR QL STRIP: ABNORMAL
LYMPHOCYTES # BLD AUTO: 0.3 10E3/UL (ref 0.8–5.3)
LYMPHOCYTES NFR BLD AUTO: 6 %
MCH RBC QN AUTO: 30.7 PG (ref 26.5–33)
MCHC RBC AUTO-ENTMCNC: 33.8 G/DL (ref 31.5–36.5)
MCV RBC AUTO: 91 FL (ref 78–100)
MONOCYTES # BLD AUTO: 0.5 10E3/UL (ref 0–1.3)
MONOCYTES NFR BLD AUTO: 10 %
MUCOUS THREADS #/AREA URNS LPF: PRESENT /LPF
NEUTROPHILS # BLD AUTO: 4.5 10E3/UL (ref 1.6–8.3)
NEUTROPHILS NFR BLD AUTO: 83 %
NITRATE UR QL: NEGATIVE
NRBC # BLD AUTO: 0 10E3/UL
NRBC BLD AUTO-RTO: 0 /100
PH UR STRIP: 5.5 [PH] (ref 5–7)
PLATELET # BLD AUTO: 276 10E3/UL (ref 150–450)
PROT SERPL-MCNC: 7.2 G/DL (ref 6.4–8.3)
RBC # BLD AUTO: 4.82 10E6/UL (ref 3.8–5.2)
RBC URINE: 1 /HPF
SP GR UR STRIP: 1.02 (ref 1–1.03)
SQUAMOUS EPITHELIAL: 10 /HPF
UROBILINOGEN UR STRIP-MCNC: NORMAL MG/DL
WBC # BLD AUTO: 5.4 10E3/UL (ref 4–11)
WBC URINE: 64 /HPF

## 2025-01-21 PROCEDURE — 85025 COMPLETE CBC W/AUTO DIFF WBC: CPT | Performed by: PATHOLOGY

## 2025-01-21 PROCEDURE — 99213 OFFICE O/P EST LOW 20 MIN: CPT | Mod: GC | Performed by: PSYCHIATRY & NEUROLOGY

## 2025-01-21 PROCEDURE — 36415 COLL VENOUS BLD VENIPUNCTURE: CPT | Performed by: PATHOLOGY

## 2025-01-21 PROCEDURE — 87086 URINE CULTURE/COLONY COUNT: CPT | Performed by: NURSE PRACTITIONER

## 2025-01-21 PROCEDURE — 80076 HEPATIC FUNCTION PANEL: CPT | Performed by: PATHOLOGY

## 2025-01-21 PROCEDURE — 99000 SPECIMEN HANDLING OFFICE-LAB: CPT | Performed by: PATHOLOGY

## 2025-01-21 PROCEDURE — 81001 URINALYSIS AUTO W/SCOPE: CPT | Performed by: PATHOLOGY

## 2025-01-21 PROCEDURE — 99214 OFFICE O/P EST MOD 30 MIN: CPT | Performed by: PSYCHIATRY & NEUROLOGY

## 2025-01-21 PROCEDURE — G2211 COMPLEX E/M VISIT ADD ON: HCPCS | Performed by: PSYCHIATRY & NEUROLOGY

## 2025-01-21 ASSESSMENT — PAIN SCALES - GENERAL: PAINLEVEL_OUTOF10: NO PAIN (0)

## 2025-01-21 NOTE — Clinical Note
2025       RE: Shakila Kapoor  [address] [place] [address]     Dear Colleague,    Thank you for referring your patient, Shakila Kapoor, to the [facility] [place] at [facility]. Please see a copy of my visit note below.    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 Her MS appears clinically and radiologically stable. She is tolerating her Mayzent well without any notable side effects or recent infections. Her chronic balance and urinary symptoms have been stable without significant impairment to her daily activities. She is interested in pursuing PT to improve her gait/balance in the summer time after the end of the school-year.    Plan:  Continue Mayzent daily    2.   Blood tests today    3.   Return to clinic in 6 months    4.   MRI Brain & Orbits in May 2025 per Rad-Onc    5.   Please contact Dr. Harris for PT referral when ready to schedule    This patient was discussed with the neurology attending faculty, Dr. Harris.    Teodoro Echevarria DO  Neurology Resident PGY4  2025    Attending physician: I saw and evaluated the patient with Dr. Echevarria and I agree with the findings and plan of care as documented above, with the following additions.    The patient is clinically and radiologically stable as regards any evidence of active inflammatory demyelination on current disease modifying therapy  with ozanimod. I will check routine laboratory studies for monitoring of this medication today, to include complete blood counts with differential and hepatic panel.    Sarcoidosis is essentially asymptomatic at present, without evidence of neurologic involvement. She will continue to follow with Dr. Perlman of pulmonary medicine for ongoing monitoring.    She is scheduled for MRI of the brain and orbits with Dr. Carney of radiation oncology later this year; I defer to Dr. Carney as to whether imaging is needed in 2026.    She had to cancel her appointment for urodynamic study and will get this rescheduled. She denies any further urinary tract infections in the past several months.    Remainder as above.    The longitudinal plans of care for the diagnoses as documented were addressed during this visit. Due to the added complexity in care, I will continue to support the patient in subsequent management and with ongoing continuity of care.    Daniel Harris MD   of Neurology  [facility]    Diagnoses:  1) Multiple sclerosis  2) Sarcoidosis, without evidence of neurologic involvement  3) Optic nerve sheath meningioma  4) Neurogenic bladder  5) Gait disturbance    Last Visit Summary   The patient was last seen as of: 5/16/2024    Disease onset: At age 20 left side sensory symptoms and probable optic neuritis. She was initially thought to have probable/possible MS. She did not have any neurologic changes until 2019 when she developed progressive vision loss in the right eye. 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 She then did develop demyelinating disease on MRI and was started on Rebif. She has significant vision loss in right eye.       Previous disease modifying therapy: Rebif (radiologic disease progression), then 2021  "ozanimod. Treatment was changed to siponimod within the past year at the demand of her insurance company.     Since the patient was last seen in MS clinic in November 2023 she was transition to Mayzent from ozanimod and has been tolerating the medication well.  The patient was suffering from recurrent UTIs and during her evaluation a CT abdomen was obtained which showed enlarged lymph nodes in her abdomen which prompted the physicians to get an CT scan of her chest which showed lymph node enlargement in her lung which was biopsied after which she was given a diagnosis of sarcoidosis.  She has a follow-up visit with pulmonology in August 2024 for the same.  She has not started any treatment for sarcoidosis yet.  In March 2024 she experienced 3 weeks of right face numbness which went away but was curious if this was MS related.  Dr. Harris was contacted through [employer] for the same who did not think the symptoms were MS related.  An MRI of the brain was repeated on 5/9/2024 which did not show any new MS related lesions and a stable right optic nerve meningioma.  Otherwise the patient denies any weakness, numbness, tingling, vision changes, hearing changes, incontinence of bowel/bladder, word finding, slurred speech, confusion, incoordination.     Interval History   She denies any significant changes including new/recent MS symptoms or side effects from Mayzent. No changes in her vision.    She continues to be \"slowed down\" by her gait. 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 She has not had any falls/near falls. It is not significantly limiting her participation in daily or desired activities.    Reports her urinary urgency has gotten better. It typically wakes her up once per night. She met with Urology and her urodynamic study is pending scheduling.    Per last visit with Pulmonology, no treatment " "indicated at present.     She has no new questions or concerns this visit.       Physical Exam   /77 (BP Location: Right arm, Patient Position: Sitting, Cuff Size: Adult Regular)   Pulse 92   Ht 1.68 m (5' 6.14\")   Wt 91.4 kg (201 lb 9.6 oz)   SpO2 95%   BMI 32.40 kg/m        Neurologic  Mental Status:  alert, oriented x 3, follows commands, speech clear and fluent, naming and repetition normal  Cranial Nerves:  visual fields intact, Pupil reactive on LEFT only, APD on RIGHT, EOMI with normal smooth pursuit, facial sensation intact and symmetric, facial movements symmetric, hearing not formally tested but intact to conversation, palate elevation symmetric and uvula midline, no dysarthria, shoulder shrug strong bilaterally, tongue protrusion midline  Fundi: non-dilated exam; normal with sharp discs on LEFT, diffuse disc pallor on RIGHT; no papilledema, normal vessels, no hemorrhages or exudates bilaterally  Motor:  normal muscle tone and bulk, no abnormal movements, able to move all limbs spontaneously, strength 5/5 throughout upper and lower extremities, no pronator drift  Reflexes:  2+ and symmetric throughout BUE & LLE, 3+ crossed adductors on RIGHT patellar, no clonus  Sensory:  light touch sensation intact and symmetric throughout upper and lower extremities, no extinction on double simultaneous stimulation   Coordination:  normal finger-to-nose and heel-to-shin bilaterally without dysmetria, rapid alternating movements symmetric  Station/Gait:  normal stride, turns, arm swing & posture with mildly increased width (~1\"); stable toe/heel walk; unable to perform Tandem walk due to imbalance.   -Romberg negative.   -Can rise from chair with arms crossed & maintain single-leg stand ~1-2 seconds bilaterally. Single-leg hop deferred.     Investigations   I have personally reviewed most recent and pertinent labs, tests, and radiological images; relevant findings per HPI.    Imaging  MRI Brain w & w/o " contrast (05/09/2024 8:13 AM)   IMPRESSION:  1. Stable right optic nerve sheath meningioma.  2. Stable chronic demyelinating disease plaque burden. No evidence of  active demyelination.    Labs   Latest Reference Range & Units 07/19/24 10:12   Albumin 3.5 - 5.2 g/dL 4.2   Protein Total 6.4 - 8.3 g/dL 6.7   Alkaline Phosphatase 40 - 150 U/L 66   ALT 0 - 50 U/L 21   AST 0 - 45 U/L 21      Latest Reference Range & Units 07/19/24 10:12   WBC 4.0 - 11.0 10e3/uL 5.6   Hemoglobin 11.7 - 15.7 g/dL 14.7   Hematocrit 35.0 - 47.0 % 42.9   Platelet Count 150 - 450 10e3/uL 278   RBC Count 3.80 - 5.20 10e6/uL 4.67   MCV 78 - 100 fL 92   MCH 26.5 - 33.0 pg 31.5   MCHC 31.5 - 36.5 g/dL 34.3   RDW 10.0 - 15.0 % 13.0   % Neutrophils % 82   % Lymphocytes % 6   % Monocytes % 8   % Eosinophils % 2   % Basophils % 1   Absolute Basophils 0.0 - 0.2 10e3/uL 0.0   Absolute Eosinophils 0.0 - 0.7 10e3/uL 0.1   Absolute Immature Granulocytes <=0.4 10e3/uL 0.0   Absolute Lymphocytes 0.8 - 5.3 10e3/uL 0.3 (L)   Absolute Monocytes 0.0 - 1.3 10e3/uL 0.4   % Immature Granulocytes % 1   Absolute Neutrophils 1.6 - 8.3 10e3/uL 4.6   Absolute NRBCs 10e3/uL 0.0   NRBCs per 100 WBC <1 /100 0   (L): Data is abnormally low      Again, thank you for allowing me to participate in the care of your patient.      Sincerely,    Daniel Harris MD

## 2025-01-21 NOTE — LETTER
2025      RE: Shakila Kapoor  [address] [place] [address]       [facility]  CLINIC & SURGERY CENTER  MS Neurology Clinic - Return Visit Note    Patient Name:  Shakila Kapoor  MRN:  [id number]    :  1965  Date of Service:  2025       Last Visit Summary   The patient was last seen as of: 2024    Disease onset: At age 20 left side sensory symptoms and probable optic neuritis. She was initially thought to have probable/possible MS. She did not have any neurologic changes until 2019 when she developed progressive vision loss in the right eye. 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 She then did develop demyelinating disease on MRI and was started on Rebif. She has significant vision loss in right eye.       Previous disease modifying therapy: Rebif (radiologic disease progression), then  ozanimod. Treatment was changed to siponimod within the past year at the demand of her insurance company.     Since the patient was last seen in MS clinic in 2023 [facility] and has been tolerating the medication well.  The patient was suffering from recurrent UTIs and during her evaluation a CT abdomen was obtained which showed enlarged lymph nodes in her abdomen which prompted the physicians to get an CT scan of her chest which showed lymph node enlargement in her lung which was biopsied after which she was given a diagnosis of sarcoidosis.  She has a follow-up visit with pulmonology in 2024 for the same.  She has not started any treatment for sarcoidosis yet.  In 2024 she experienced 3 weeks of right face numbness which went away but was curious if this was MS related.  Dr. Harris was contacted through [employer] for the same who did not think the symptoms were MS related.  An MRI of the  "brain was repeated on 5/9/2024 which did not show any new MS related lesions and a stable right optic nerve meningioma.  Otherwise the patient denies any weakness, numbness, tingling, vision changes, hearing changes, incontinence of bowel/bladder, word finding, slurred speech, confusion, incoordination.     Interval History   She denies any significant changes including new/recent MS symptoms or side effects from Mayzent. No changes in her vision.    She continues to be \"slowed down\" by her gait. 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 She has not had any falls/near falls. It is not significantly limiting her participation in daily or desired activities.    Reports her urinary urgency has gotten better. It typically wakes her up once per night. She met with Urology and her urodynamic study is pending scheduling.    Per last visit with Pulmonology, no treatment indicated at present.     She has no new questions or concerns this visit.       Physical Exam   /77 (BP Location: Right arm, Patient Position: Sitting, Cuff Size: Adult Regular)   Pulse 92   Ht 1.68 m (5' 6.14\")   Wt 91.4 kg (201 lb 9.6 oz)   SpO2 95%   BMI 32.40 kg/m        Neurologic  Mental Status:  alert, oriented x 3, follows commands, speech clear and fluent, naming and repetition normal  Cranial Nerves:  visual fields intact, Pupil reactive on LEFT only, APD on RIGHT, EOMI with normal smooth pursuit, facial sensation intact and symmetric, facial movements symmetric, hearing not formally tested but intact to conversation, palate elevation symmetric and uvula midline, no dysarthria, shoulder shrug strong bilaterally, tongue protrusion midline  Fundi: non-dilated exam; normal with sharp discs on LEFT, diffuse disc pallor on RIGHT; no papilledema, normal vessels, no hemorrhages or exudates bilaterally  Motor:  normal muscle tone and " "bulk, no abnormal movements, able to move all limbs spontaneously, strength 5/5 throughout upper and lower extremities, no pronator drift  Reflexes:  2+ and symmetric throughout BUE & LLE, 3+ crossed adductors on RIGHT patellar, no clonus  Sensory:  light touch sensation intact and symmetric throughout upper and lower extremities, no extinction on double simultaneous stimulation   Coordination:  normal finger-to-nose and heel-to-shin bilaterally without dysmetria, rapid alternating movements symmetric  Station/Gait:  normal stride, turns, arm swing & posture with mildly increased width (~1\"); stable toe/heel walk; unable to perform Tandem walk due to imbalance.   -Romberg negative.   -Can rise from chair with arms crossed & maintain single-leg stand ~1-2 seconds bilaterally. Single-leg hop deferred.     Investigations   I have personally reviewed most recent and pertinent labs, tests, and radiological images; relevant findings per HPI.    Imaging  MRI Brain w & w/o contrast (05/09/2024 8:13 AM)   IMPRESSION:  1. Stable right optic nerve sheath meningioma.  2. Stable chronic demyelinating disease plaque burden. No evidence of  active demyelination.    Labs   Latest Reference Range & Units 07/19/24 10:12   Albumin 3.5 - 5.2 g/dL 4.2   Protein Total 6.4 - 8.3 g/dL 6.7   Alkaline Phosphatase 40 - 150 U/L 66   ALT 0 - 50 U/L 21   AST 0 - 45 U/L 21      Latest Reference Range & Units 07/19/24 10:12   WBC 4.0 - 11.0 10e3/uL 5.6   Hemoglobin 11.7 - 15.7 g/dL 14.7   Hematocrit 35.0 - 47.0 % 42.9   Platelet Count 150 - 450 10e3/uL 278   RBC Count 3.80 - 5.20 10e6/uL 4.67   MCV 78 - 100 fL 92   MCH 26.5 - 33.0 pg 31.5   MCHC 31.5 - 36.5 g/dL 34.3   RDW 10.0 - 15.0 % 13.0   % Neutrophils % 82   % Lymphocytes % 6   % Monocytes % 8   % Eosinophils % 2   % Basophils % 1   Absolute Basophils 0.0 - 0.2 10e3/uL 0.0   Absolute Eosinophils 0.0 - 0.7 10e3/uL 0.1   Absolute Immature Granulocytes <=0.4 10e3/uL 0.0   Absolute Lymphocytes " 0.8 - 5.3 10e3/uL 0.3 (L)   Absolute Monocytes 0.0 - 1.3 10e3/uL 0.4   % Immature Granulocytes % 1   Absolute Neutrophils 1.6 - 8.3 10e3/uL 4.6   Absolute NRBCs 10e3/uL 0.0   NRBCs per 100 WBC <1 /100 0   (L): Data is abnormally low         Assessment & Plan    Shakila Kapoor is a 59 year old female with PMHx of MS w/ RIGHT eye optic neuritis (2019), 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 Her MS appears clinically and radiologically stable. She is tolerating her Mayzent well without any notable side effects or recent infections. Her chronic balance and urinary symptoms have been stable without significant impairment to her daily activities. She is interested in pursuing PT to improve her gait/balance in the summer time after the end of the school-year.    Plan:  Continue Mayzent daily    2.   Blood tests today    3.   Return to clinic in 6 months    4.   MRI Brain & Orbits in May 2025 per Rad-Onc    5.   Please contact Dr. Harris for PT referral when ready to schedule    This patient was discussed with the neurology attending faculty, Dr. Harris.    Teodoro Echevarria DO  Neurology Resident PGY4  01/21/2025    Attending physician: I saw and evaluated the patient with Dr. Echevarria and I agree with the findings and plan of care as documented above, with the following additions.    The patient is clinically and radiologically stable as regards any evidence of active inflammatory demyelination on current disease modifying therapy with ozanimod. I will check routine laboratory studies for monitoring of this medication today, to include complete blood counts with differential and hepatic panel.    Sarcoidosis is essentially asymptomatic at present, without evidence of neurologic involvement. She will continue to  follow with Dr. Perlman of pulmonary medicine for ongoing monitoring.    She is scheduled for MRI of the brain and orbits with Dr. Carney of radiation oncology later this year; I defer to Dr. Carney as to whether imaging is needed in 2026.    She had to cancel her appointment for urodynamic study and will get this rescheduled. She denies any further urinary tract infections in the past several months.    Remainder as above.    The longitudinal plans of care for the diagnoses as documented were addressed during this visit. Due to the added complexity in care, I will continue to support the patient in subsequent management and with ongoing continuity of care.    Daniel Harris MD   of Neurology  [facility]    Diagnoses:  1) Multiple sclerosis  2) Sarcoidosis, without evidence of neurologic involvement  3) Optic nerve sheath meningioma  4) Neurogenic bladder  5) Gait disturbance          Cc:  Margarita Suazo DNP (PCP)  David Perlman, MD (Pulmonary Medicine)  Rosie Carney MD, PhD (Radiation Oncology)  Patient

## 2025-01-21 NOTE — PROGRESS NOTES
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 Her MS appears clinically and radiologically stable. She is tolerating her Mayzent well without any notable side effects or recent infections. Her chronic balance and urinary symptoms have been stable without significant impairment to her daily activities. She is interested in pursuing PT to improve her gait/balance in the Summertime after the end of the school-year.    Plan:  Continue Mayzent daily    2.   Blood tests today    3.   Return to clinic in 6 months    4.   MRI Brain & Orbits in May 2025 per Rad-Onc    5.   Please contact Dr. Harris for PT referral when ready to schedule    This patient was discussed with the neurology attending faculty, Dr. Harris.    Teodoro Echevarria DO  Neurology Resident PGY4  2025    Last Visit Summary   The patient was last seen as of: 2024    Disease onset: At age 20 left side sensory symptoms and probable optic neuritis. She was initially thought to have probable/possible MS. She did not have any neurologic changes until  when she developed progressive vision loss in the right eye. 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 She then did develop demyelinating disease on MRI and was started on Rebif. She has significant vision loss in right eye.       Previous disease modifying  "therapy: Rebif (radiologic disease progression), then 2021 ozanimod. Treatment was changed to siponimod within the past year at the demand of her insurance company.     Since the patient was last seen in MS clinic in November 2023 she was transition to Mayzent from ozanimod and has been tolerating the medication well.  The patient was suffering from recurrent UTIs and during her evaluation a CT abdomen was obtained which showed enlarged lymph nodes in her abdomen which prompted the physicians to get an CT scan of her chest which showed lymph node enlargement in her lung which was biopsied after which she was given a diagnosis of sarcoidosis.  She has a follow-up visit with pulmonology in August 2024 for the same.  She has not started any treatment for sarcoidosis yet.  In March 2024 she experienced 3 weeks of right face numbness which went away but was curious if this was MS related.  Dr. Harris was contacted through [employer] for the same who did not think the symptoms were MS related.  An MRI of the brain was repeated on 5/9/2024 which did not show any new MS related lesions and a stable right optic nerve meningioma.  Otherwise the patient denies any weakness, numbness, tingling, vision changes, hearing changes, incontinence of bowel/bladder, word finding, slurred speech, confusion, incoordination.     Interval History   She denies any significant changes including new/recent MS symptoms or side effects from Mayzent. No changes in her vision.    She continues to be \"slowed down\" by her gait. 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 She has not had any falls/near falls. It is not significantly limiting her participation in daily or desired activities.    Reports her urinary urgency has gotten better. It typically wakes her up once per night. She met with Urology and her urodynamic study is pending " "scheduling.    Per last visit with Pulmonology, no treatment indicated at present.     She has no new questions or concerns this visit.       Physical Exam   /77 (BP Location: Right arm, Patient Position: Sitting, Cuff Size: Adult Regular)   Pulse 92   Ht 1.68 m (5' 6.14\")   Wt 91.4 kg (201 lb 9.6 oz)   SpO2 95%   BMI 32.40 kg/m        Neurologic  Mental Status:  alert, oriented x 3, follows commands, speech clear and fluent, naming and repetition normal  Cranial Nerves:  visual fields intact, Pupil reactive on LEFT only, APD on RIGHT, EOMI with normal smooth pursuit, facial sensation intact and symmetric, facial movements symmetric, hearing not formally tested but intact to conversation, palate elevation symmetric and uvula midline, no dysarthria, shoulder shrug strong bilaterally, tongue protrusion midline  Fundi: non-dilated exam; normal with sharp discs on LEFT, diffuse disc pallor on RIGHT; no papilledema, normal vessels, no hemorrhages or exudates bilaterally  Motor:  normal muscle tone and bulk, no abnormal movements, able to move all limbs spontaneously, strength 5/5 throughout upper and lower extremities, no pronator drift  Reflexes:  2+ and symmetric throughout BUE & LLE, 3+ crossed adductors on RIGHT patellar, no clonus  Sensory:  light touch sensation intact and symmetric throughout upper and lower extremities, no extinction on double simultaneous stimulation   Coordination:  normal finger-to-nose and heel-to-shin bilaterally without dysmetria, rapid alternating movements symmetric  Station/Gait:  normal stride, turns, arm swing & posture with mildly increased width (~1\"); stable toe/heel walk; unable to perform Tandem walk due to imbalance.   -Romberg negative.   -Can rise from chair with arms crossed & maintain single-leg stand ~1-2 seconds bilaterally. Single-leg hop deferred.     Investigations   I have personally reviewed most recent and pertinent labs, tests, and radiological images; " relevant findings per HPI.    Imaging  MRI Brain w & w/o contrast (05/09/2024 8:13 AM)   IMPRESSION:  1. Stable right optic nerve sheath meningioma.  2. Stable chronic demyelinating disease plaque burden. No evidence of  active demyelination.    Labs   Latest Reference Range & Units 07/19/24 10:12   Albumin 3.5 - 5.2 g/dL 4.2   Protein Total 6.4 - 8.3 g/dL 6.7   Alkaline Phosphatase 40 - 150 U/L 66   ALT 0 - 50 U/L 21   AST 0 - 45 U/L 21      Latest Reference Range & Units 07/19/24 10:12   WBC 4.0 - 11.0 10e3/uL 5.6   Hemoglobin 11.7 - 15.7 g/dL 14.7   Hematocrit 35.0 - 47.0 % 42.9   Platelet Count 150 - 450 10e3/uL 278   RBC Count 3.80 - 5.20 10e6/uL 4.67   MCV 78 - 100 fL 92   MCH 26.5 - 33.0 pg 31.5   MCHC 31.5 - 36.5 g/dL 34.3   RDW 10.0 - 15.0 % 13.0   % Neutrophils % 82   % Lymphocytes % 6   % Monocytes % 8   % Eosinophils % 2   % Basophils % 1   Absolute Basophils 0.0 - 0.2 10e3/uL 0.0   Absolute Eosinophils 0.0 - 0.7 10e3/uL 0.1   Absolute Immature Granulocytes <=0.4 10e3/uL 0.0   Absolute Lymphocytes 0.8 - 5.3 10e3/uL 0.3 (L)   Absolute Monocytes 0.0 - 1.3 10e3/uL 0.4   % Immature Granulocytes % 1   Absolute Neutrophils 1.6 - 8.3 10e3/uL 4.6   Absolute NRBCs 10e3/uL 0.0   NRBCs per 100 WBC <1 /100 0   (L): Data is abnormally low   40 - 150 U/L 66   ALT 0 - 50 U/L 21   AST 0 - 45 U/L 21      Latest Reference Range & Units 07/19/24 10:12   WBC 4.0 - 11.0 10e3/uL 5.6   Hemoglobin 11.7 - 15.7 g/dL 14.7   Hematocrit 35.0 - 47.0 % 42.9   Platelet Count 150 - 450 10e3/uL 278   RBC Count 3.80 - 5.20 10e6/uL 4.67   MCV 78 - 100 fL 92   MCH 26.5 - 33.0 pg 31.5   MCHC 31.5 - 36.5 g/dL 34.3   RDW 10.0 - 15.0 % 13.0   % Neutrophils % 82   % Lymphocytes % 6   % Monocytes % 8   % Eosinophils % 2   % Basophils % 1   Absolute Basophils 0.0 - 0.2 10e3/uL 0.0   Absolute Eosinophils 0.0 - 0.7 10e3/uL 0.1   Absolute Immature Granulocytes <=0.4 10e3/uL 0.0   Absolute Lymphocytes 0.8 - 5.3 10e3/uL 0.3 (L)   Absolute Monocytes 0.0 - 1.3 10e3/uL 0.4   % Immature Granulocytes % 1   Absolute Neutrophils 1.6 - 8.3 10e3/uL 4.6   Absolute NRBCs 10e3/uL 0.0   NRBCs per 100 WBC <1 /100 0   (L): Data is abnormally low

## 2025-01-21 NOTE — NURSING NOTE
Chief Complaint   Patient presents with    MS    RECHECK     MS follow up      Vitals were taken and medications were reconciled.    Ramez Acosta, EMT  8:28 AM

## 2025-01-22 LAB — BACTERIA UR CULT: NORMAL

## 2025-02-03 ENCOUNTER — TELEPHONE (OUTPATIENT)
Dept: NEUROLOGY | Facility: CLINIC | Age: 60
End: 2025-02-03
Payer: COMMERCIAL

## 2025-02-03 NOTE — TELEPHONE ENCOUNTER
PA Initiation    Medication: MAYZENT 1 MG PO TABS  Insurance Company: [employer] - Phone [phone] Fax [phone]  Pharmacy Filling the Rx: [facility] - [place], [place] - [address]  Filling Pharmacy Phone:    Filling Pharmacy Fax:    Start Date: 2/3/2025        Thank you,    Jasmyn Garcia [facility]  Specialty Pharmacy Clinic Liaison - CardiologyNeurologyMultiple [facility] [address]  [address] [place] [address]  Ph: [phone] Fax: [phone]  [email or web address]

## 2025-02-05 NOTE — TELEPHONE ENCOUNTER
Prior Authorization Approval    Medication: MAYZENT 1 MG PO TABS  Authorization Effective Date: 2/4/2025  Authorization Expiration Date: 2/3/2026  Approved Dose/Quantity: 30 days  Reference #: CMM KEY: [id number]   Insurance Company: [employer] - Phone [phone] Fax [phone]  Expected CoPay: $    CoPay Card Available:      Financial Assistance Needed:   Which Pharmacy is filling the prescription: [facility] - [place], [place] - [address]  Pharmacy Notified: Yes  Patient Notified: Yes          Thank you,    Jasmyn Garcia h-T  Specialty Pharmacy Clinic Liaison - CardiologyNeurologyMultiple Sclerosis  [facility]  [address] [place] [address]  3rd [place] [address]  Ph: [phone] Fax: [phone]  [email or web address]

## 2025-02-12 DIAGNOSIS — G35 MULTIPLE SCLEROSIS (H): ICD-10-CM

## 2025-02-12 RX ORDER — SIPONIMOD 1 MG/1
1 TABLET, FILM COATED ORAL DAILY
Qty: 30 TABLET | Refills: 11 | Status: SHIPPED | OUTPATIENT
Start: 2025-02-12

## 2025-02-12 NOTE — TELEPHONE ENCOUNTER
Received refill request for Mayzent from [facility]; Patient was last seen in Jan 2025 and has follow up appointment in July 2025 with Dr Harris. Refilled per MS refill protocol.    Flower Barnett RN

## 2025-04-17 ENCOUNTER — TELEPHONE (OUTPATIENT)
Dept: PULMONOLOGY | Facility: CLINIC | Age: 60
End: 2025-04-17
Payer: COMMERCIAL

## 2025-04-17 NOTE — TELEPHONE ENCOUNTER
Patient Contacted for the patient to call back and schedule the following:    Appointment type: RTN ILD   Provider: PERLMAN   Return date: 07/17/25  Specialty phone number: [phone]  Additional appointment(s) needed: PFT   Additonal Notes: N/A

## 2025-05-15 ENCOUNTER — LAB (OUTPATIENT)
Dept: LAB | Facility: CLINIC | Age: 60
End: 2025-05-15
Payer: COMMERCIAL

## 2025-05-15 ENCOUNTER — ANCILLARY PROCEDURE (OUTPATIENT)
Dept: MRI IMAGING | Facility: CLINIC | Age: 60
End: 2025-05-15
Attending: STUDENT IN AN ORGANIZED HEALTH CARE EDUCATION/TRAINING PROGRAM
Payer: COMMERCIAL

## 2025-05-15 DIAGNOSIS — D32.0 PRIMARY MENINGIOMA OF OPTIC NERVE SHEATH (H): ICD-10-CM

## 2025-05-15 DIAGNOSIS — T66.XXXS RADIATION ADVERSE EFFECT, SEQUELA: ICD-10-CM

## 2025-05-15 LAB
ANION GAP SERPL CALCULATED.3IONS-SCNC: 9 MMOL/L (ref 7–15)
BUN SERPL-MCNC: 14.9 MG/DL (ref 6–20)
CALCIUM SERPL-MCNC: 9.8 MG/DL (ref 8.8–10.4)
CHLORIDE SERPL-SCNC: 107 MMOL/L (ref 98–107)
CORTIS SERPL-MCNC: 6.5 UG/DL
CREAT SERPL-MCNC: 0.88 MG/DL (ref 0.51–0.95)
EGFRCR SERPLBLD CKD-EPI 2021: 76 ML/MIN/1.73M2
FSH SERPL IRP2-ACNC: 42.7 MIU/ML
GLUCOSE SERPL-MCNC: 109 MG/DL (ref 70–99)
HCO3 SERPL-SCNC: 27 MMOL/L (ref 22–29)
LH SERPL-ACNC: 26 MIU/ML
POTASSIUM SERPL-SCNC: 4.7 MMOL/L (ref 3.4–5.3)
PROLACTIN SERPL 3RD IS-MCNC: 18 NG/ML (ref 5–23)
SODIUM SERPL-SCNC: 143 MMOL/L (ref 135–145)
TSH SERPL DL<=0.005 MIU/L-ACNC: 2.32 UIU/ML (ref 0.3–4.2)

## 2025-05-15 PROCEDURE — 84146 ASSAY OF PROLACTIN: CPT | Performed by: STUDENT IN AN ORGANIZED HEALTH CARE EDUCATION/TRAINING PROGRAM

## 2025-05-15 PROCEDURE — 82533 TOTAL CORTISOL: CPT | Performed by: STUDENT IN AN ORGANIZED HEALTH CARE EDUCATION/TRAINING PROGRAM

## 2025-05-15 PROCEDURE — 99000 SPECIMEN HANDLING OFFICE-LAB: CPT | Performed by: PATHOLOGY

## 2025-05-15 PROCEDURE — 83001 ASSAY OF GONADOTROPIN (FSH): CPT | Performed by: STUDENT IN AN ORGANIZED HEALTH CARE EDUCATION/TRAINING PROGRAM

## 2025-05-15 PROCEDURE — A9585 GADOBUTROL INJECTION: HCPCS | Performed by: RADIOLOGY

## 2025-05-15 PROCEDURE — 70543 MRI ORBT/FAC/NCK W/O &W/DYE: CPT | Mod: GC | Performed by: RADIOLOGY

## 2025-05-15 PROCEDURE — 84305 ASSAY OF SOMATOMEDIN: CPT | Performed by: STUDENT IN AN ORGANIZED HEALTH CARE EDUCATION/TRAINING PROGRAM

## 2025-05-15 PROCEDURE — 83002 ASSAY OF GONADOTROPIN (LH): CPT | Performed by: STUDENT IN AN ORGANIZED HEALTH CARE EDUCATION/TRAINING PROGRAM

## 2025-05-15 PROCEDURE — 70553 MRI BRAIN STEM W/O & W/DYE: CPT | Mod: GC | Performed by: RADIOLOGY

## 2025-05-15 RX ORDER — GADOBUTROL 604.72 MG/ML
10 INJECTION INTRAVENOUS ONCE
Status: COMPLETED | OUTPATIENT
Start: 2025-05-15 | End: 2025-05-15

## 2025-05-15 RX ADMIN — GADOBUTROL 9 ML: 604.72 INJECTION INTRAVENOUS at 09:07

## 2025-05-22 DIAGNOSIS — D32.0 PRIMARY MENINGIOMA OF OPTIC NERVE SHEATH (H): Primary | ICD-10-CM

## 2025-06-03 ENCOUNTER — MYC MEDICAL ADVICE (OUTPATIENT)
Dept: FAMILY MEDICINE | Facility: CLINIC | Age: 60
End: 2025-06-03
Payer: COMMERCIAL

## 2025-06-05 ENCOUNTER — OFFICE VISIT (OUTPATIENT)
Dept: FAMILY MEDICINE | Facility: CLINIC | Age: 60
End: 2025-06-05
Payer: COMMERCIAL

## 2025-06-05 VITALS
WEIGHT: 196 LBS | SYSTOLIC BLOOD PRESSURE: 130 MMHG | RESPIRATION RATE: 25 BRPM | HEART RATE: 110 BPM | TEMPERATURE: 98.5 F | DIASTOLIC BLOOD PRESSURE: 78 MMHG | OXYGEN SATURATION: 98 % | HEIGHT: 66 IN | BODY MASS INDEX: 31.5 KG/M2

## 2025-06-05 DIAGNOSIS — R19.5 LOOSE STOOLS: Primary | ICD-10-CM

## 2025-06-05 DIAGNOSIS — Z23 NEED FOR VACCINATION: ICD-10-CM

## 2025-06-05 DIAGNOSIS — E66.09 CLASS 1 OBESITY DUE TO EXCESS CALORIES WITH SERIOUS COMORBIDITY AND BODY MASS INDEX (BMI) OF 31.0 TO 31.9 IN ADULT: ICD-10-CM

## 2025-06-05 DIAGNOSIS — E66.811 CLASS 1 OBESITY DUE TO EXCESS CALORIES WITH SERIOUS COMORBIDITY AND BODY MASS INDEX (BMI) OF 31.0 TO 31.9 IN ADULT: ICD-10-CM

## 2025-06-05 ASSESSMENT — ENCOUNTER SYMPTOMS: DIARRHEA: 1

## 2025-06-05 ASSESSMENT — PAIN SCALES - GENERAL: PAINLEVEL_OUTOF10: NO PAIN (0)

## 2025-06-05 NOTE — PROGRESS NOTES
"  Assessment & Plan     Loose stools  We deferred testing for viral and bacterial stool panel because potential out-of-pocket cost is $500-$1200 based on her insurance.  Patient had a normal colonoscopy 3/6/202 except for a 2 mm polyp that was benign.  Patient will get C. difficile tested.  She is on 2 medications that are known to cause diarrhea as potential side effects-these include Paxil and Mayzent.  Recommend GI consultation.  In the meantime, trial of fiber supplement to help bulk up the stool.  - C. difficile Toxin B PCR with reflex to C. difficile EIA; Future  - Adult GI  Referral - Consult Only; Future    Need for vaccination    - COVID-19 12+ (PFIZER)  - HEPATITIS B, ADULT 20+ (ENGERIX-B/RECOMBIVAX HB)    Class 1 obesity due to excess calories with serious comorbidity and body mass index (BMI) of 31.0 to 31.9 in adult  She is interested in possible weight loss injections such as Wegovy, but these medications can cause GI side effects so I would like her to get the work-up as above first.  In the meantime, encouraged patient to find out if her insurance would cover Wegovy or other injectable weight loss medication.          BMI  Estimated body mass index is 31.5 kg/m  as calculated from the following:    Height as of this encounter: 1.68 m (5' 6.14\").    Weight as of this encounter: 88.9 kg (196 lb).             Alyssa Jhaveri is a 59 year old, presenting for the following health issues:  Diarrhea (On going )      6/5/2025     6:47 AM   Additional Questions   Roomed by Jeanine LIVINGSTON     Diarrhea    History of Present Illness       Reason for visit:  On going diarrhea  Symptom onset:  More than a month  Symptom intensity:  Moderate  Symptom progression:  Staying the same  Had these symptoms before:  Yes  Has tried/received treatment for these symptoms:  No  What makes it worse:  No  What makes it better:  No   She is taking medications regularly.      Additional provider notes:    For over a year " "she has had concern of diarrhea.  Attributes the diarrhea to her morning routine- she always first drinks a diet coke and then eats breakfast.  Shortly after this she always has a liquid stool which is often accompanied by urgency.  She tried changing to coffee but has same symptoms. Sometimes hard to manage, a couple times she has had bowl accidents.    Two of her medications, Paxil and Mayzent, are known to cause diarrhea as potential side effects.  She had a colonoscopy 3/6/2024 that showed a 2 mm polyp but otherwise was normal.    Upcoming travel to [place] and she is concerned she may have bowel incontinence while there    She is interested in considering weight loss medication                Objective    /78 (BP Location: Right arm, Patient Position: Sitting, Cuff Size: Adult Regular)   Pulse 110   Temp 98.5  F (36.9  C) (Oral)   Resp 25   Ht 1.68 m (5' 6.14\")   Wt 88.9 kg (196 lb)   SpO2 98%   BMI 31.50 kg/m    Body mass index is 31.5 kg/m .  Physical Exam   GENERAL: healthy, alert, no distress, and over weight  EYES: Eyes grossly normal to inspection, PERRL and conjunctivae and sclerae normal  HENT: ear canals and TM's normal, nose and mouth without ulcers or lesions  RESP: lungs clear to auscultation - no rales, rhonchi or wheezes  CV: regular rates and rhythm, normal S1 S2, no S3 or S4, and no murmur, click or rub  ABDOMEN: soft, nontender and bowel sounds normal  PSYCH: mentation appears normal, affect normal/bright            Signed Electronically by: CARLOS Schultz CNP    "

## 2025-06-05 NOTE — PATIENT INSTRUCTIONS
IGGY MARTIN MD         1) Bulk forming    Your colons job is to absorb the liquid in your stool.  As we get older the colon or other medications can slow down the colon and allow the stool to get to firm.  Fiber mixes with your stool and does not allow all the water to get absorbed by the colon.  This will not give you diarrhea in fact I use it for people with diarrhea since it causes the stool to bulk up more and is easier to control.     Below are several fiber options.     For your constipation try using Metamucil or it's generic equivalent 1-2 tablespoons with a large glass of water every day.       You can also use flax seed that is easily obtained at your grocery store. Make sure it is ground up and sprinkle 2 tablespoons on your cereal each morning and drink a large glass of water with it.  You can also mix in yogurt, and even bake in bread or muffins.    Flax seed seems to give less gas and does not have any taste.     - Psyllium (Metamucil) Take up to 1 tablespoon three times daily  - Methylcellulose (Citrucel) Take up to 1 tablespoon or 4 caplets three times daily  - Polycarbophil (FiberCon) Take 2-4 caplets daily  - Wheat dextrin (Benefiber) Take 1-3 caplets or 2 teaspoons three times daily

## 2025-06-05 NOTE — NURSING NOTE
Prior to immunization administration, verified patients identity using patient s name and date of birth. Please see Immunization Activity for additional information.     Screening Questionnaire for Adult Immunization    Are you sick today?   No   Do you have allergies to medications, food, a vaccine component or latex?   No   Have you ever had a serious reaction after receiving a vaccination?   No   Do you have a long-term health problem with heart, lung, kidney, or metabolic disease (e.g., diabetes), asthma, a blood disorder, no spleen, complement component deficiency, a cochlear implant, or a spinal fluid leak?  Are you on long-term aspirin therapy?   No   Do you have cancer, leukemia, HIV/AIDS, or any other immune system problem?   No   Do you have a parent, brother, or sister with an immune system problem?   No   In the past 3 months, have you taken medications that affect  your immune system, such as prednisone, other steroids, or anticancer drugs; drugs for the treatment of rheumatoid arthritis, Crohn s disease, or psoriasis; or have you had radiation treatments?   No   Have you had a seizure, or a brain or other nervous system problem?   No   During the past year, have you received a transfusion of blood or blood    products, or been given immune (gamma) globulin or antiviral drug?   No   For women: Are you pregnant or is there a chance you could become       pregnant during the next month?   No   Have you received any vaccinations in the past 4 weeks?   No     Immunization questionnaire answers were all negative.        Patient instructed to remain in clinic for 15 minutes afterwards, and to report any adverse reactions.     Screening performed by Crystal Reveles MA on 6/5/2025 at 7:55 AM.

## 2025-06-10 ENCOUNTER — PATIENT OUTREACH (OUTPATIENT)
Dept: CARE COORDINATION | Facility: CLINIC | Age: 60
End: 2025-06-10
Payer: COMMERCIAL

## 2025-06-12 ENCOUNTER — PATIENT OUTREACH (OUTPATIENT)
Dept: CARE COORDINATION | Facility: CLINIC | Age: 60
End: 2025-06-12
Payer: COMMERCIAL

## 2025-07-28 ENCOUNTER — PATIENT OUTREACH (OUTPATIENT)
Dept: CARE COORDINATION | Facility: CLINIC | Age: 60
End: 2025-07-28
Payer: COMMERCIAL

## 2025-07-30 ENCOUNTER — THERAPY VISIT (OUTPATIENT)
Dept: PHYSICAL THERAPY | Facility: REHABILITATION | Age: 60
End: 2025-07-30
Attending: PSYCHIATRY & NEUROLOGY
Payer: COMMERCIAL

## 2025-07-30 DIAGNOSIS — R26.9 ABNORMAL GAIT: ICD-10-CM

## 2025-07-30 DIAGNOSIS — G35 MS (MULTIPLE SCLEROSIS) (H): Primary | ICD-10-CM

## 2025-07-30 PROCEDURE — 97161 PT EVAL LOW COMPLEX 20 MIN: CPT | Mod: GP

## 2025-07-30 PROCEDURE — 97112 NEUROMUSCULAR REEDUCATION: CPT | Mod: GP

## 2025-07-30 NOTE — PROGRESS NOTES
PHYSICAL THERAPY EVALUATION  Type of Visit: Evaluation       Fall Risk Screen:  Have you fallen 2 or more times in the past year?: No  Have you fallen and had an injury in the past year?: No    Subjective         Presenting condition or subjective complaint: Due to MS , gaitis off, slow, balance is poor  Date of onset: 06/18/25    Relevant medical history: Multiple Sclerosis   Dates & types of surgery:      Prior diagnostic imaging/testing results: MRI; CT scan; X-ray     Prior therapy history for the same diagnosis, illness or injury: No      Prior Level of Function  IND    Living Environment  Social support: With a significant other or spouse   Type of home: House; 1 level; 2-story   Stairs to enter the home: Yes 2 Is there a railing: No     Ramp: No   Stairs inside the home: Yes 12 Is there a railing: No     Help at home: None  Equipment owned: Bath bench     Employment: Yes   Hobbies/Interests: Time with friends and family, reading, gardening, crafts    Patient goals for therapy: Move quicker and more confident not worry about falling    Pain assessment: Pain denied     Objective      OBSERVATION: visually impaired R eye  INTEGUMENTARY: Intact  POSTURE: elevated L shoulder   PALPATION: n/a  RANGE OF MOTION: LE ROM WFL  UE ROM WFL  STRENGTH: LE Strength WFL  UE Strength WFL    BED MOBILITY: WNL    TRANSFERS: WNL, use of BUE for safety     WHEELCHAIR MOBILITY: n/a    GAIT:   Level of [place]: Independent  Assistive Device(s): None  Gait Deviations: toe out, wide BRENT  Gait Distance: shorter distances  Stairs: UE support and reciprocal gait     BALANCE: see below     SPECIAL TESTS  Functional Gait Assessment (FGA) TOTAL SCORE: (MAXIMUM SCORE 30): 14    10 Meter Walk Test (Comfortable)     10 Meter Walk Test (Fast)     6 Minute Walk Test (6MWT)           [place] Balance Scale (BBS)     5 Times Sit-to-Stand (5TSTS)  12 sec BUE pushoff lap     Dynamic Gait Index (DGI)     Timed Up and Go  (TUG) - sec    Single Leg Stance Right (sec)    Single Leg Stance Left (sec)    Modified CTSIB Conditions (sec) Cond 1:  30''  Cond 2:  30'' increased sway at ankles hips  Cond 4:  30''   Cond 5 : 30'' increased sway at ankles hips   Romberg  (sec) 30''   Sharpened Romberg (sec)    30 Second Sit to Stand (reps/height)    Mini-BESTest              Assessment & Plan   CLINICAL IMPRESSIONS  Medical Diagnosis: G35 (ICD-10-CM) - MS (multiple sclerosis) (H)  R26.9 (ICD-10-CM) - Abnormal gait    Treatment Diagnosis: Balance impairment, generalized weakness, force production deficit   Impression/Assessment: Patient is a 59 year old female with gait instability complaints.  The following significant findings have been identified: Pain, Decreased ROM/flexibility, Decreased joint mobility, Decreased strength, Impaired balance, Decreased proprioception, Impaired gait, Impaired muscle performance, Decreased activity tolerance, Impaired posture, and Instability. These impairments interfere with their ability to perform self care tasks, work tasks, recreational activities, household chores, driving , household mobility, and community mobility as compared to previous level of function. Pt presents to PT wanting to improve gait instability, balance and confiedence for more safety with movement. Has diagnosis of MS and visually impaired into R eye. Performed eval to demonstrate increased risk of falls with FGA score, most challenged with narrow BRENT/single leg and head turns. Pt would benefit from skilled PT to improve function, independence and safety.     Clinical Decision Making (Complexity):  Clinical Presentation: Stable/Uncomplicated  Clinical Presentation Rationale: based on medical and personal factors listed in PT evaluation  Clinical Decision Making (Complexity): Low complexity    PLAN OF CARE  Treatment Interventions:  Interventions: Gait Training, Manual Therapy, Neuromuscular Re-education, Therapeutic Activity,  Therapeutic Exercise, Self-Care/Home Management    Long Term Goals     PT Goal 1  Goal Identifier: HEP  Goal Description: Patient will be independent in self-management of condition and HEP to reach functional goals.  Target Date: 10/22/25  PT Goal 2  Goal Identifier: FGA  Goal Description: Pt will demonstrate a 4 point improvement on the FGA to demonstrate minimum clinically significant difference in score to demonstrate improved safety with functional mobility and decrease risk of falls.  Rationale: to maximize safety and independence with performance of ADLs and functional tasks;to maximize safety and independence with self cares  Target Date: 10/22/25  PT Goal 3  Goal Identifier: 6 MWT  Goal Description: Pt will increase distance ambulated on 6MWT with LRAD by 120 feet to demonstrate improved endurance with ambulation.  Rationale: to maximize safety and independence with performance of ADLs and functional tasks;to maximize safety and independence with self cares  Target Date: 10/22/25  PT Goal 4  Goal Identifier: 5 x STS  Goal Description: Pt will be able to perform 5xSTS in 12 seconds or less to demonstrate appropriate LE strength for community dwelling adults.  Target Date: 10/22/25      Frequency of Treatment: 1x/wk  Duration of Treatment: 12 weeks    Recommended Referrals to Other Professionals:   Education Assessment:   Learner/Method: Patient  Education Comments: Verbalizes understanding    Risks and benefits of evaluation/treatment have been explained.   Patient/Family/caregiver agrees with Plan of Care.     Evaluation Time:     PT Eval, Low Complexity Minutes (03416): 30       Signing Clinician: JANA NDIAYE, PT

## 2025-08-06 ENCOUNTER — THERAPY VISIT (OUTPATIENT)
Dept: PHYSICAL THERAPY | Facility: REHABILITATION | Age: 60
End: 2025-08-06
Attending: PSYCHIATRY & NEUROLOGY
Payer: COMMERCIAL

## 2025-08-06 DIAGNOSIS — R26.9 ABNORMAL GAIT: ICD-10-CM

## 2025-08-06 DIAGNOSIS — G35 MS (MULTIPLE SCLEROSIS) (H): Primary | ICD-10-CM

## 2025-08-06 PROCEDURE — 97112 NEUROMUSCULAR REEDUCATION: CPT | Mod: GP

## 2025-08-06 PROCEDURE — 97110 THERAPEUTIC EXERCISES: CPT | Mod: GP

## (undated) DEVICE — SU MONOCRYL 4-0 PS-2 27" UND Y426H

## (undated) DEVICE — DRAPE MICROSCOPE LEICA 54X120" 09-MK653

## (undated) DEVICE — SPONGE KITTNER 30-101

## (undated) DEVICE — DECANTER VIAL 2006S

## (undated) DEVICE — SU VICRYL 3-0 SH 8X18" UND J864D

## (undated) DEVICE — SPONGE COTTONOID 1/4X1/4" 20-01S

## (undated) DEVICE — DRAPE C-ARM W/STRAPS 42X72" 07-CA104

## (undated) DEVICE — WIPES FOLEY CARE SURESTEP PROVON DFC100

## (undated) DEVICE — COVER BIG CASE BACK TABLE 6'X2' 420-HD-S

## (undated) DEVICE — RX SURGIFLO HEMOSTATIC MATRIX W/THROMBIN 8ML 2994

## (undated) DEVICE — PREP CHLORAPREP CLEAR 3ML 930400

## (undated) DEVICE — SPONGE COTTONOID 1/2X1/2" 20-04S

## (undated) DEVICE — ESU GROUND PAD ADULT W/CORD E7507

## (undated) DEVICE — PREP POVIDONE IODINE SOLUTION 10% 4OZ BOTTLE 29906-004

## (undated) DEVICE — PREP SKIN SCRUB TRAY 4461A

## (undated) DEVICE — LINEN TOWEL PACK X30 5481

## (undated) DEVICE — LINEN TOWEL PACK X6 WHITE 5487

## (undated) DEVICE — SPONGE SURGIFOAM 100 1974

## (undated) DEVICE — ESU CORD BIPOLAR GREEN 10-4000

## (undated) DEVICE — CATH TRAY FOLEY SURESTEP 16FR W/TMP PRB STLK LATEX A319416AM

## (undated) DEVICE — SOL WATER IRRIG 1000ML BOTTLE 2F7114

## (undated) DEVICE — DRAPE MAYO STAND 23X54 8337

## (undated) DEVICE — DRAPE SHEET REV FOLD 3/4 9349

## (undated) DEVICE — Device

## (undated) DEVICE — SOL NACL 0.9% IRRIG 1000ML BOTTLE 2F7124

## (undated) DEVICE — ADH SKIN CLOSURE PREMIERPRO EXOFIN 1.0ML 3470

## (undated) DEVICE — IOM SUPPLIES/SPECIALTY CARE

## (undated) DEVICE — BUR STRK CARBIDE MATCH HEAD 3.0MM 5820-107-530C

## (undated) DEVICE — PACK NEURO MINOR UMMC SNE32MNMU4

## (undated) DEVICE — PAD CHUX UNDERPAD 23X24" 7136

## (undated) DEVICE — DRAPE STERI TOWEL SM 1000

## (undated) DEVICE — DRAPE POUCH INSTRUMENT 1018

## (undated) DEVICE — SYR 30ML LL W/O NDL 302832

## (undated) DEVICE — PREP DURAPREP 26ML APL 8630

## (undated) DEVICE — SUCTION MANIFOLD NEPTUNE 2 SYS 4 PORT 0702-020-000

## (undated) DEVICE — PREP POVIDONE-IODINE 7.5% SCRUB 4OZ BOTTLE MDS093945

## (undated) DEVICE — ESU PENCIL SMOKE EVAC W/ROCKER SWITCH 0703-047-000

## (undated) DEVICE — ENDO NDL BX ASPIRATION EBUS 21GA VIZISHOT NA-201SX-4021

## (undated) DEVICE — NDL SPINAL 18GA 3.5" 405184

## (undated) DEVICE — ESU ELEC BLADE 2.75" COATED/INSULATED E1455

## (undated) DEVICE — NDL BLUNT 17GA 1.5" 8881202330

## (undated) DEVICE — FORCEP BIOPSY RADIAL JAW 4 BRONCH M00515180

## (undated) DEVICE — GLOVE PROTEXIS MICRO 7.5  2D73PM75

## (undated) RX ORDER — LABETALOL HYDROCHLORIDE 5 MG/ML
INJECTION, SOLUTION INTRAVENOUS
Status: DISPENSED
Start: 2022-12-20

## (undated) RX ORDER — HYDROMORPHONE HYDROCHLORIDE 1 MG/ML
INJECTION, SOLUTION INTRAMUSCULAR; INTRAVENOUS; SUBCUTANEOUS
Status: DISPENSED
Start: 2022-12-20

## (undated) RX ORDER — HYDROMORPHONE HCL IN WATER/PF 6 MG/30 ML
PATIENT CONTROLLED ANALGESIA SYRINGE INTRAVENOUS
Status: DISPENSED
Start: 2022-12-20

## (undated) RX ORDER — LIDOCAINE HYDROCHLORIDE 10 MG/ML
INJECTION, SOLUTION EPIDURAL; INFILTRATION; INTRACAUDAL; PERINEURAL
Status: DISPENSED
Start: 2024-04-18

## (undated) RX ORDER — PROPOFOL 10 MG/ML
INJECTION, EMULSION INTRAVENOUS
Status: DISPENSED
Start: 2024-04-18

## (undated) RX ORDER — ACETAMINOPHEN 325 MG/1
TABLET ORAL
Status: DISPENSED
Start: 2022-12-20

## (undated) RX ORDER — FENTANYL CITRATE 50 UG/ML
INJECTION, SOLUTION INTRAMUSCULAR; INTRAVENOUS
Status: DISPENSED
Start: 2022-12-20

## (undated) RX ORDER — LIDOCAINE HYDROCHLORIDE AND EPINEPHRINE 10; 10 MG/ML; UG/ML
INJECTION, SOLUTION INFILTRATION; PERINEURAL
Status: DISPENSED
Start: 2024-04-18

## (undated) RX ORDER — EPHEDRINE SULFATE 50 MG/ML
INJECTION, SOLUTION INTRAMUSCULAR; INTRAVENOUS; SUBCUTANEOUS
Status: DISPENSED
Start: 2022-12-20

## (undated) RX ORDER — ONDANSETRON 2 MG/ML
INJECTION INTRAMUSCULAR; INTRAVENOUS
Status: DISPENSED
Start: 2024-04-18

## (undated) RX ORDER — DEXAMETHASONE SODIUM PHOSPHATE 10 MG/ML
INJECTION, SOLUTION INTRAMUSCULAR; INTRAVENOUS
Status: DISPENSED
Start: 2024-04-18

## (undated) RX ORDER — FENTANYL CITRATE 50 UG/ML
INJECTION, SOLUTION INTRAMUSCULAR; INTRAVENOUS
Status: DISPENSED
Start: 2024-03-06

## (undated) RX ORDER — LIDOCAINE HYDROCHLORIDE AND EPINEPHRINE 10; 10 MG/ML; UG/ML
INJECTION, SOLUTION INFILTRATION; PERINEURAL
Status: DISPENSED
Start: 2022-12-20

## (undated) RX ORDER — GABAPENTIN 300 MG/1
CAPSULE ORAL
Status: DISPENSED
Start: 2022-12-20

## (undated) RX ORDER — PROPOFOL 10 MG/ML
INJECTION, EMULSION INTRAVENOUS
Status: DISPENSED
Start: 2022-12-20

## (undated) RX ORDER — FENTANYL CITRATE 50 UG/ML
INJECTION, SOLUTION INTRAMUSCULAR; INTRAVENOUS
Status: DISPENSED
Start: 2024-04-18